# Patient Record
Sex: MALE | Race: WHITE | Employment: PART TIME | URBAN - METROPOLITAN AREA
[De-identification: names, ages, dates, MRNs, and addresses within clinical notes are randomized per-mention and may not be internally consistent; named-entity substitution may affect disease eponyms.]

---

## 2018-05-31 NOTE — PROGRESS NOTES
Physical Therapy  Patient cancelled appointment for 05 31 18, due to illness.   Next visit confirmed for June 7, 2018 @ 08:00 AM

## 2018-06-07 ENCOUNTER — HOSPITAL ENCOUNTER (OUTPATIENT)
Dept: PHYSICAL THERAPY | Age: 53
Discharge: HOME | End: 2018-06-30
Attending: INTERNAL MEDICINE | Admitting: INTERNAL MEDICINE

## 2018-06-07 NOTE — FLOWSHEET NOTE
Outpatient Physical Therapy     [x] Daily Treatment Note   [] Progress Note   [] Discharge Note    Date:  6/7/2018    Patient Name:  Wallace Alcala        YOB: 1965    Restrictions/Precautions: left UE fistula, SVT/AVNRT defibrillator on left side placed in late February or March. ESRD on hemodialysis  Diagnosis: Lymphedema                              ICD 10: I89.0               Treatment Diagnosis:  Bilateral edema  Onset Date: 5/1/17                 Referral Date: 5/8/18             Referring Physician:  Aramis Carballo MD  Visits Allowed/Insurance/Certification information: Burnice Kelli dual  Pt's Occupation/Job Duties: 4 hours 2 days per week, [employer] and [employer]. Social support/Environment: Patient reports that he lives alone in an apartment with one flight of steps to get to apartment. Patient sleeps in rocker recliner with legs going down. Patient has bench and supports his legs on bench. Health History reviewed with pt:  SVT has defibrillator on left side. Had 4 ablations, Low blood pressure, A. Fib, morbid obesity; end stage renal disease 3 days of dialysis per week. Patient has difficulty with breathing when laying flat. Gout. Patient takes extra strength tylenol. Medication in addition to list in chart Multaq 400 mg, metoprolol 25 mg, Midodrine 10 mg 3 times per day, IC Allopurinol 100 mg. Patient went to wound care for left LE secondary to wound on posterior aspect; patient reports that he has been discharged from wound care. Patient reports no lymph nodes removed in past.     Plan of care signed (Y/N): Faxed to MD through [address] [place] [address] Rd. Progress Note covers period from (if applicable):    [x]  NA    [] From          To           Next Progress Note due:   6/28/18    Visit# / total visits:  1/12    Plan for Next Session:  Exercise and wrapping will perform massage if has clearance from cardiac concerns.     Subjective: see evaluation     Pain level: patient did not indicate pain    Objective:       Exercises:    Exercises in bold performed in department today. Items not bolded are carried forward from prior visits for continuity of the record. Exercise/Equipment Resistance/Repetitions Other comments   Education: results of evaluation, exercise and plan of care. toe curls, AP, knee bends and hip IR/ER   Discussed importance of elevating legs and performing exercises. Therapeutic Exercise/Home Exercise Program: see above; discuss/educated on anatomy, condition and treatment of lymphedema and current concerns when reviewing patient history 40 minutes     Group Therapy:   N/A    Therapeutic Activity:  N/A      Gait: N/A    Neuromuscular Re-Education: N/A      Manual Therapy: N/A    Modalities:  N/A    Functional Outcome Measure:   Date recorded: 6/8/18  LEFS  29/64=45=55%    Assessment/Treatment/Activity Tolerance:    GCode:  /CK  Patients response to treatment:   [] Patient tolerated treatment well [] Patient limited by fatigue   [] Patient limited by pain [] Patient limited by other medical complications   [] Other:     Goals:   Progress towards goals:   GOALS  G Code:/CJ  Short Term Goals ( 3   weeks) Long Term Goals (  6 weeks)   1). Initiation of HEP 1). Patient able to don and doff appropriate garment for bilateral LE.   2). 2). Patient able to perform exercises 3-4 times per day with legs elevated. 3). 3). Decrease edema above knee bilaterally by 5-10 cm each LE.   4). 4). Patient reports able to walk better with less difficulty from edema. 5). 5).   6). 6).      Prognosis: [x] Good [] Fair  [] Poor    Patient Requires Follow-up:  [x] Yes  [] No    Plan: [] Continue per plan of care [] Alter current plan (see comments)   [x] Plan of care initiated [] Hold pending MD visit [] Discharge    Timed Code Treatment Minutes:  40    Total

## 2018-06-07 NOTE — PROGRESS NOTES
The following patient has been evaluated for physical therapy services. Please review the attached evaluation and/or summary of the patient's plan of care, and verify that you agree therapy should continue by signing the attached document and sending it back to our office. Thank you for this referral.    Physician signature_______________________ Date________________    Fax to:   [facility] [phone]              LYMPHEDEMA EVALUATION    Date:  2018    Patient Name:  Anitra Unger    :  1965  Restrictions/Precautions: left UE fistula, SVT/AVNRT defibrillator on left side placed in late February or March. ESRD on hemodialysis  Diagnosis: Lymphedema    ICD 10: I89.0  Treatment Diagnosis:  Bilateral edema  Onset Date: 17  Referral Date: 18  Referring Physician:  Ibeth Cummings MD  Visits Allowed/Insurance/Certification information: Jose sheldon  Pt's Occupation/Job Duties: 4 hours 2 days per week, [employer] and [employer]. Social support/Environment: Patient reports that he lives alone in an apartment with one flight of steps to get to apartment. Patient sleeps in rocker recliner with legs going down. Patient has bench and supports his legs on bench. Health History reviewed with pt:  SVT has defibrillator on left side. Had 4 ablations, Low blood pressure, A. Fib, morbid obesity; end stage renal disease 3 days of dialysis per week. Patient has difficulty with breathing when laying flat. Gout. Patient takes extra strength tylenol. Medication in addition to list in chart Multaq 400 mg, metoprolol 25 mg, Midodrine 10 mg 3 times per day, IC Allopurinol 100 mg. Patient went to wound care for left LE secondary to wound on posterior aspect; patient reports that he has been discharged from wound care.  Patient reports no lymph nodes removed in past.    Plan of care signed (Y/N): faxed to MD  Visit# / total visits:  1/  Pain level: patient did not indicate pain   Patient goal for

## 2018-06-28 ENCOUNTER — HOSPITAL ENCOUNTER (OUTPATIENT)
Dept: PHYSICAL THERAPY | Age: 53
Discharge: HOME | End: 2018-06-29

## 2018-07-01 ENCOUNTER — HOSPITAL ENCOUNTER (OUTPATIENT)
Dept: OTHER | Age: 53
Discharge: HOME | End: 2018-07-01
Attending: INTERNAL MEDICINE | Admitting: INTERNAL MEDICINE

## 2018-07-05 ENCOUNTER — HOSPITAL ENCOUNTER (OUTPATIENT)
Dept: PHYSICAL THERAPY | Age: 53
Discharge: HOME | End: 2018-07-06

## 2018-07-05 NOTE — PROGRESS NOTES
Outpatient Physical Therapy     [x] Daily Treatment Note   [x] Progress Note   [] Discharge Note    Date:  7/5/2018    Patient Name:  hSalini Monroe  \"[id number]\"       YOB: 1965    Restrictions/Precautions: left UE fistula, SVT/AVNRT defibrillator on left side placed in late February or March. ESRD on hemodialysis; dialysis Mon, Wed, Friday. 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 Social support/Environment: Patient reports that he lives alone in an apartment with one flight of steps to get to apartment. Patient sleeps in rocker recliner with legs going down. Patient has bench and supports his legs on bench. Health History reviewed with pt:  SVT has defibrillator on left side. Had 4 ablations, Low blood pressure, A. Fib, morbid obesity; end stage renal disease 3 days of dialysis per week. Patient has difficulty with breathing when laying flat. Gout. Patient takes extra strength tylenol. Medication in addition to list in chart Multaq 400 mg, metoprolol 25 mg, Midodrine 10 mg 3 times per day, IC Allopurinol 100 mg. Patient went to wound care for left LE secondary to wound on posterior aspect; patient reports that he has been discharged from wound care. Patient reports no lymph nodes removed in past.     Plan of care signed (Y/N): Faxed to MD through [address] [place] [address] Rd. Received initial evaluation signed by MD. Received note signed by MD for [employer].      Progress Note covers period from (if applicable):    []  NA    [x] From 6/7/18    To   6/28/18        Next Progress Note due:  7/19/18    Visit# / total visits:  3/12    Plan for Next Session:  Exercise and wrapping will perform massage if has clearance from cardiac concerns. Subjective: Patient reports that the wrap only stayed up for half a day. Patient reports that he did not bring wraps back. Instructed patient to bring wraps back next visit. Pain level: Patient did not indicate pain before or after treatment except in low back with standing. Objective:      Before 97%, 78 pulse, /66  After 96%, 70 pulse, /68    Exercises:    Exercises in bold performed in department today. Items not bolded are carried forward from prior visits for continuity of the record. Exercise/Equipment Resistance/Repetitions Other comments   Education: results of evaluation, exercise and plan of care. toe curls, AP, knee bends and hip IR/ER  1x10 Patient wanted to try belt to help bend legs. Used sheet instead secondary to less stress on plantar surface of foot. Therapeutic Exercise/Home Exercise Program: see above; 15 minutes    Group Therapy:   N/A    Therapeutic Activity:  N/A      Gait: N/A    Neuromuscular Re-Education: N/A      Manual Therapy: 46 minutes  Cleared nodes and performed lymphatic massage to upper thigh. Wrapping of right upper thigh with 6 inch stockinette, 6 in cast padding, 2x8 cm Comprilan srinivasan crossed, 2x10 cm Comprilan angled upward. Then placed Circaid strap at angle and attached with Velcro straps and foam straps. Over top of everything, one 6 inch stockinette which was strapped with 2 sheet straps to shirt. Instructed patient to take off if numbness, tingling, falling down or pain.      Modalities:  N/A    Functional Outcome Measure:   Date recorded: 6/28/18 same as prior  LEFS  29/64=45=55%    Assessment/Treatment/Activity Tolerance:    GCode:  /CK  Patients response to treatment:   [x] Patient tolerated treatment well [] Patient limited by fatigue   [] Patient limited

## 2018-07-12 ENCOUNTER — HOSPITAL ENCOUNTER (OUTPATIENT)
Dept: PHYSICAL THERAPY | Age: 53
Discharge: HOME | End: 2018-07-13

## 2018-07-12 NOTE — FLOWSHEET NOTE
Outpatient Physical Therapy     [x] Daily Treatment Note   [x] Progress Note   [] Discharge Note    Date:  7/12/2018    Patient Name:  Silverio Gifford  \"[id number]\"       YOB: 1965    Restrictions/Precautions: left UE fistula, SVT/AVNRT defibrillator on left side placed in late February or March. ESRD on hemodialysis; dialysis Mon, Wed, Friday. 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 Social support/Environment: Patient reports that he lives alone in an apartment with one flight of steps to get to apartment. Patient sleeps in rocker recliner with legs going down. Patient has bench and supports his legs on bench. Health History reviewed with pt:  SVT has defibrillator on left side. Had 4 ablations, Low blood pressure, A. Fib, morbid obesity; end stage renal disease 3 days of dialysis per week. Patient has difficulty with breathing when laying flat. Gout. Patient takes extra strength tylenol. Medication in addition to list in chart Multaq 400 mg, metoprolol 25 mg, Midodrine 10 mg 3 times per day, IC Allopurinol 100 mg. Patient went to wound care for left LE secondary to wound on posterior aspect; patient reports that he has been discharged from wound care. Patient reports no lymph nodes removed in past.     Plan of care signed (Y/N): Faxed to MD through [address] [place] [address] Rd. Received initial evaluation signed by MD. Received note signed by MD for [employer].      Progress Note covers period from (if applicable):    [x]  NA    [] From 6/7/18    To   6/28/18        Next Progress Note due:  7/19/18    Visit# / total visits:  4/12    Plan for Next Session:  Exercise and wrapping will perform massage if has clearance from cardiac concerns. Subjective: Patient reports that wrapping stayed up all day Thursday into Friday. Patient stated that he was having some stomach issues on Tuesday. Pain level: Patient did not indicate pain before or after treatment. Objective:     Before 98%, 76 pulse, /68  After 96%, 75 pulse, /65    Exercises:    Exercises in bold performed in department today. Items not bolded are carried forward from prior visits for continuity of the record. Exercise/Equipment Resistance/Repetitions Other comments   Education: results of evaluation, exercise and plan of care. toe curls, AP, knee bends and hip IR/ER  1x10 Used sheet on plantar surface of foot. Therapeutic Exercise/Home Exercise Program: see above; 10 minutes    Group Therapy:   N/A    Therapeutic Activity:  N/A      Gait: N/A    Neuromuscular Re-Education: N/A      Manual Therapy: 46 minutes  Cleared nodes and performed lymphatic massage to upper thigh, bilaterally. Wrapping of right upper thigh with 6 inch stockinette, 6 in cast padding, 2x8 cm Comprilan srinivasan crossed, 2x10 cm Comprilan angled upward. Then placed Circaid strap at angle and attached with Velcro straps and foam straps. Over top of everything, one 6 inch stockinette which was strapped with 2 sheet straps to shirt. Instructed patient to take off if numbness, tingling, falling down or pain.    Leg wrapping weight - 1.2 inside small plastic bag (ice bag)    Modalities:  N/A    Functional Outcome Measure:   Date recorded: 6/28/18 same as prior  LEFS  29/64=45=55%    Assessment/Treatment/Activity Tolerance:    GCode:  /CK  Patients response to treatment:   [x] Patient tolerated treatment well [] Patient limited by fatigue   [] Patient limited by pain [] Patient limited by other medical complications   [] Other:     Goals:   Progress towards goals:   GOALS  G Code:/CJ  Short Term Goals ( 3   weeks) Long Term Goals (  6 weeks)   1). Initiation of HEP-MET 1). Patient able to don and doff appropriate garment for bilateral LE.   2). 2). Patient able to perform exercises 3-4 times per day with legs elevated. 3). 3). Decrease edema above knee bilaterally by 5-10 cm each LE.   4). 4). Patient reports able to walk better with less difficulty from edema. 5). 5).   6). 6). Prognosis: [x] Good [] Fair  [] Poor    Patient Requires Follow-up:  [x] Yes  [] No    Plan: [x] Continue per plan of care [] Alter current plan (see comments)   [] Plan of care initiated [] Hold pending MD visit [] Discharge   Will continue 2 times per week for 6 weeks for exercise and wrapping and progress lymphatic massage as patient tolerates. Timed Code Treatment Minutes: 68      Total Treatment Minutes: 68    Medicare Cap total YTD:  [phone]    Electronically signed by:   VICKI [id number]

## 2018-07-17 ENCOUNTER — HOSPITAL ENCOUNTER (OUTPATIENT)
Dept: PHYSICAL THERAPY | Age: 53
Setting detail: THERAPIES SERIES
Discharge: HOME | End: 2018-07-17
Payer: COMMERCIAL

## 2018-07-17 PROCEDURE — 97140 MANUAL THERAPY 1/> REGIONS: CPT

## 2018-07-17 PROCEDURE — 97110 THERAPEUTIC EXERCISES: CPT

## 2018-07-17 NOTE — FLOWSHEET NOTE
massage if has clearance from cardiac concerns. Subjective: Patient reports that he has leaking of heart and hardening of heart. Patient had doppler echo. Pain level: Patient reports he is sore in thoracic region secondary to echo. Objective:     Before 98%, 67 pulse, /69  After 98%, 72 pulse, /70 waited 10 minutes 139/78, 98%, pulse 70    Exercises:    Exercises in bold performed in department today. Items not bolded are carried forward from prior visits for continuity of the record. Exercise/Equipment Resistance/Repetitions Other comments   Education: results of evaluation, exercise and plan of care. toe curls, AP, knee bends and hip IR/ER  1x10 Used sheet on plantar surface of foot. Therapeutic Exercise/Home Exercise Program: see above; 15 minutes    Group Therapy:   N/A    Therapeutic Activity:  N/A      Gait: N/A    Neuromuscular Re-Education: N/A      Manual Therapy: 66 minutes  Cleared nodes and performed lymphatic massage to upper thigh, bilaterally. Cleaned right thigh before wrapping with foam soap. Wrapping of right upper thigh with 6 inch stockinette, 6 in cast padding, 2x8 cm Comprilan srinivasan crossed, 2x10 cm Comprilan angled upward. Then placed Circaid strap at angle and attached with Velcro straps and foam straps. Over top of everything, one 6 inch stockinette which was strapped with 2 sheet straps to shirt. Instructed patient to take off if numbness, tingling, falling down or pain.    Leg wrapping weight - 1.2 inside small plastic bag (ice bag)    Modalities:  N/A    Functional Outcome Measure:   Date recorded: 6/28/18 same as prior  LEFS  29/64=45=55%    Assessment/Treatment/Activity Tolerance:    GCode:  /CK  Patients response to treatment:   [x] Patient tolerated treatment well [] Patient limited by fatigue   [] Patient limited by pain [] Patient limited by other medical complications   [] Other:     Goals:   Progress towards goals:   GOALS  G Code:/CJ  Short Term Goals ( 3   weeks) Long Term Goals (  6 weeks)   1). Initiation of HEP-MET 1). Patient able to don and doff appropriate garment for bilateral LE.   2). 2). Patient able to perform exercises 3-4 times per day with legs elevated. 3). 3). Decrease edema above knee bilaterally by 5-10 cm each LE.   4). 4). Patient reports able to walk better with less difficulty from edema. 5). 5).   6). 6). Prognosis: [x] Good [] Fair  [] Poor    Patient Requires Follow-up:  [x] Yes  [] No    Plan: [x] Continue per plan of care [] Alter current plan (see comments)   [] Plan of care initiated [] Hold pending MD visit [] Discharge   Will continue 2 times per week for 6 weeks for exercise and wrapping and progress lymphatic massage as patient tolerates. Timed Code Treatment Minutes: 81     Total Treatment Minutes: 81    Medicare Cap total YTD:  [phone]    Electronically signed by:   Jeniffer Ramírez [id number]

## 2018-07-19 ENCOUNTER — HOSPITAL ENCOUNTER (OUTPATIENT)
Dept: PHYSICAL THERAPY | Age: 53
Setting detail: THERAPIES SERIES
Discharge: HOME | End: 2018-07-19
Payer: COMMERCIAL

## 2018-07-19 NOTE — FLOWSHEET NOTE
Physical Therapy  Cancellation/No-show Note  Patient Name:  Abilio Carmen  :  1965   Date:  2018  Cancels to Date: 3  No-shows to Date: 0    For today's appointment patient:  [x]  Cancelled  []  Rescheduled appointment  []  No-show     Reason given by patient:  []  Patient ill  []  Conflicting appointment  []  No transportation    []  Conflict with work  [x]  No reason given, left message this AM  []  Other:     Comments:      Electronically signed by:   Kailee Monteiro, [id number]

## 2018-07-24 ENCOUNTER — HOSPITAL ENCOUNTER (OUTPATIENT)
Dept: PHYSICAL THERAPY | Age: 53
Setting detail: THERAPIES SERIES
Discharge: HOME | End: 2018-07-24
Payer: COMMERCIAL

## 2018-07-24 PROCEDURE — 97140 MANUAL THERAPY 1/> REGIONS: CPT

## 2018-07-24 PROCEDURE — G8979 MOBILITY GOAL STATUS: HCPCS

## 2018-07-24 PROCEDURE — G8978 MOBILITY CURRENT STATUS: HCPCS

## 2018-07-24 NOTE — FLOWSHEET NOTE
Outpatient Physical Therapy     [x] Daily Treatment Note   [] Progress Note   [] Discharge Note    Date:  7/24/2018    Patient Name:  Annabelle Mi  \"[id number]\"       YOB: 1965    Restrictions/Precautions: left UE fistula, SVT/AVNRT defibrillator on left side placed in late February or March. ESRD on hemodialysis; dialysis Mon, Wed, Friday. Diagnosis: Lymphedema                              ICD 10: I89.0               Treatment Diagnosis:  Bilateral edema  Onset Date: 5/1/17                 Referral Date: 5/8/18             Referring Physician:  Gabriela Burrell MD  Visits Allowed/Insurance/Certification information: Romeo Call dual  Pt's Occupation/Job Duties: 4 hours 2 days per week, [employer] and [employer]. Social support/Environment: Patient reports that he lives alone in an apartment with one flight of steps to get to apartment. Patient sleeps in rocker recliner with legs going down. Patient has bench and supports his legs on bench. Health History reviewed with pt:  SVT has defibrillator on left side. Had 4 ablations, Low blood pressure, A. Fib, morbid obesity; end stage renal disease 3 days of dialysis per week. Patient has difficulty with breathing when laying flat. Gout. Patient takes extra strength tylenol. Medication in addition to list in chart Multaq 400 mg, metoprolol 25 mg, Midodrine 10 mg 3 times per day, IC Allopurinol 100 mg. Patient went to wound care for left LE secondary to wound on posterior aspect; patient reports that he has been discharged from wound care. Patient reports no lymph nodes removed in past.     Plan of care signed (Y/N): Faxed to MD through [address] [place] [address] Rd. Received initial evaluation signed by MD. Received note signed by MD for [employer].      Progress Note covers period from (if applicable):    [x]  NA    [] From 6/7/18    To   6/28/18        Next Progress Note due:  8/14/18    Visit# / total visits:  6/12    Plan for Next Session:  Exercise and wrapping will perform massage if has clearance from cardiac concerns. Subjective: Pt reports that had to give hep B booster at dialysis. Pt reports decreased weight to 1.1 lbs. Pain level: pt back reports start Saturday when he got up 10/10 today first getting up 5-6/10, walking back to treatment area 4-5/10 now with rest 0/10. End of treatment 2-3/10. Objective:     Before right 99%, 70 pulse, /69; 93% on left  After 98%, 69 pulse, /61     Lower Extremity R L R L R L   Date  6/7/18 6/7/18 6/28/18 6/28/18 7/24/18 7/24/18       6\"   inches above knee distal patella 83 80.9 83.8 77.9 76.5 77   2\" inches above knee distal patella         89 79.5 83.8 75.4 83.3 74.4   Knee distal patella          57 58.8 57.9 53.5 56.8 52   5.5\" Below knee distal patella 46.3 47.5 44.5 49.8 41.4 45.2   10\" Below knee distal patella 34.6 40 34.3 39.7 31.7 36.7   15\" Below knee distal patella 34 39.1 33.7 36.2 32.4 35.7           Proximal  malleolus 31.4 32.7 30.4 30.8 30.1 31.8   Midfoot-Figure 8/Navicular 60.5 62.8 58.9 59.2 57.9 59.8   Metatarsal heads          23.7 23 23.4 23.7 23.5 23                TOTAL GIRTH 459. 5   cm  464.3 cm 450.7 446.2 433.6 435.6   Decreased right by 25.9 cm and right decreased by 28.7 cm   Above knee decreased right by 5.7 cm and left by 5.1 cm    Exercises:    Exercises in bold performed in department today. Items not bolded are carried forward from prior visits for continuity of the record. Exercise/Equipment Resistance/Repetitions Other comments   Education: results of evaluation, exercise and plan of care. toe curls, AP, knee bends and hip IR/ER  1x10 Used sheet on plantar surface of foot.                                                                                                                                 Therapeutic Exercise/Home Exercise Program: see above  Group Therapy:   N/A    Therapeutic Activity:  N/A      Gait: N/A    Neuromuscular Re-Education: N/A      Manual

## 2018-07-26 ENCOUNTER — HOSPITAL ENCOUNTER (OUTPATIENT)
Dept: PHYSICAL THERAPY | Age: 53
Setting detail: THERAPIES SERIES
Discharge: HOME | End: 2018-07-26
Payer: COMMERCIAL

## 2018-07-26 PROCEDURE — 97140 MANUAL THERAPY 1/> REGIONS: CPT

## 2018-07-26 NOTE — FLOWSHEET NOTE
massage if has clearance from cardiac concerns. Subjective: Pt reports that wraps were down within 2 hours. after          Pain level: pt back reports start Saturday when he got up 10/10 today first getting up 5-6/10, walking back to treatment area 4-5/10 now with rest 0/10. End of treatment 2-3/10. Objective:     Before right 99%, 85 pulse, /69   After 99%, 70 pulse, /65     Lower Extremity R L R L R L   Date  6/7/18 6/7/18 6/28/18 6/28/18 7/24/18 7/24/18       6\"   inches above knee distal patella 83 80.9 83.8 77.9 76.5 77   2\" inches above knee distal patella         89 79.5 83.8 75.4 83.3 74.4   Knee distal patella          57 58.8 57.9 53.5 56.8 52   5.5\" Below knee distal patella 46.3 47.5 44.5 49.8 41.4 45.2   10\" Below knee distal patella 34.6 40 34.3 39.7 31.7 36.7   15\" Below knee distal patella 34 39.1 33.7 36.2 32.4 35.7           Proximal  malleolus 31.4 32.7 30.4 30.8 30.1 31.8   Midfoot-Figure 8/Navicular 60.5 62.8 58.9 59.2 57.9 59.8   Metatarsal heads          23.7 23 23.4 23.7 23.5 23                TOTAL GIRTH 459. 5   cm  464.3 cm 450.7 446.2 433.6 435.6   7/24/18: Decreased right by 25.9 cm and right decreased by 28.7 cm    Above knee decreased right by 5.7 cm and left by 5.1 cm    Exercises:    Exercises in bold performed in department today. Items not bolded are carried forward from prior visits for continuity of the record. Exercise/Equipment Resistance/Repetitions Other comments   Education: results of evaluation, exercise and plan of care. toe curls, AP, knee bends and hip IR/ER  1x10 Used sheet on plantar surface of foot. Therapeutic Exercise/Home Exercise Program: see above; education on circaid reduction kit for thigh and knee as well as adhesive wrap to hold up wraps.  d 20 minutes    Group Therapy:   N/A    Therapeutic Activity:  N/A      Gait: N/A    Neuromuscular Re-Education: N/A      Manual Therapy: 87  minutes  Cleared nodes and performed lymphatic

## 2018-07-31 ENCOUNTER — HOSPITAL ENCOUNTER (OUTPATIENT)
Dept: PHYSICAL THERAPY | Age: 53
Setting detail: THERAPIES SERIES
Discharge: HOME | End: 2018-07-31
Payer: COMMERCIAL

## 2018-07-31 PROCEDURE — 97140 MANUAL THERAPY 1/> REGIONS: CPT

## 2018-07-31 NOTE — FLOWSHEET NOTE
massage if has clearance from cardiac concerns. Subjective: Pt reports that wrapping did not last until getting home. Pain level:  Patient has back pain 1-2/10 to start. End 1-2/10    Objective:     Before right 98%, 77 pulse, /66   After 99%, 72 pulse, /82     Lower Extremity R L R L R L   Date  6/7/18 6/7/18 6/28/18 6/28/18 7/24/18 7/24/18       6\"   inches above knee distal patella 83 80.9 83.8 77.9 76.5 77   2\" inches above knee distal patella         89 79.5 83.8 75.4 83.3 74.4   Knee distal patella          57 58.8 57.9 53.5 56.8 52   5.5\" Below knee distal patella 46.3 47.5 44.5 49.8 41.4 45.2   10\" Below knee distal patella 34.6 40 34.3 39.7 31.7 36.7   15\" Below knee distal patella 34 39.1 33.7 36.2 32.4 35.7           Proximal  malleolus 31.4 32.7 30.4 30.8 30.1 31.8   Midfoot-Figure 8/Navicular 60.5 62.8 58.9 59.2 57.9 59.8   Metatarsal heads          23.7 23 23.4 23.7 23.5 23                TOTAL GIRTH 459. 5   cm  464.3 cm 450.7 446.2 433.6 435.6   7/24/18: Decreased right by 25.9 cm and right decreased by 28.7 cm    Above knee decreased right by 5.7 cm and left by 5.1 cm    Exercises:    Exercises in bold performed in department today. Items not bolded are carried forward from prior visits for continuity of the record. Exercise/Equipment Resistance/Repetitions Other comments   Education: results of evaluation, exercise and plan of care. toe curls, AP, knee bends and hip IR/ER  1x10 Used sheet on plantar surface of foot. Therapeutic Exercise/Home Exercise Program: see above    Group Therapy:   N/A    Therapeutic Activity:  N/A      Gait: N/A    Neuromuscular Re-Education: N/A      Manual Therapy: 91  minutes  Cleared nodes and performed lymphatic massage to whole leg, bilaterally. Cleaned right thigh before wrapping with foam soap.  Wrapping of right upper thigh with 6 inch stockinette, 6 in cast padding, 2x8 cm Comprilan srinivasan crossed, 2x10 cm Comprilan angled

## 2018-08-02 ENCOUNTER — HOSPITAL ENCOUNTER (OUTPATIENT)
Dept: PHYSICAL THERAPY | Age: 53
Setting detail: THERAPIES SERIES
Discharge: HOME | End: 2018-08-02
Payer: COMMERCIAL

## 2018-08-02 PROCEDURE — 97140 MANUAL THERAPY 1/> REGIONS: CPT

## 2018-08-02 NOTE — FLOWSHEET NOTE
Outpatient Physical Therapy     [x] Daily Treatment Note   [] Progress Note   [] Discharge Note    Date:  8/2/2018    Patient Name:  Kassandra Hidalgo  \"[id number]\"       YOB: 1965    Restrictions/Precautions: left UE fistula, SVT/AVNRT defibrillator on left side placed in late February or March. ESRD on hemodialysis; dialysis Mon, Wed, Friday. 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 Social support/Environment: Patient reports that he lives alone in an apartment with one flight of steps to get to apartment. Patient sleeps in rocker recliner with legs going down. Patient has bench and supports his legs on bench. Health History reviewed with pt:  SVT has defibrillator on left side. Had 4 ablations, Low blood pressure, A. Fib, morbid obesity; end stage renal disease 3 days of dialysis per week. Patient has difficulty with breathing when laying flat. Gout. Patient takes extra strength tylenol. Medication in addition to list in chart Multaq 400 mg, metoprolol 25 mg, Midodrine 10 mg 3 times per day, IC Allopurinol 100 mg. Patient went to wound care for left LE secondary to wound on posterior aspect; patient reports that he has been discharged from wound care. Patient reports no lymph nodes removed in past.     Plan of care signed (Y/N): Faxed to MD through [address] [place] [address] Rd. Received initial evaluation signed by MD. Received note signed by MD for [employer].      Progress Note covers period from (if applicable):    [x]  NA    [] From 6/7/18    To   6/28/18        Next Progress Note due:  8/14/18    Visit# / total visits:  9/12    Plan for Next Session:  Exercise and wrapping will perform Modalities:  N/A    Functional Outcome Measure:   Date recorded: 7/24/18 same as prior  LEFS  29/64=45=55%    Assessment/Treatment/Activity Tolerance:    GCode:  /CK  Patients response to treatment:   [x] Patient tolerated treatment well [] Patient limited by fatigue   [] Patient limited by pain [] Patient limited by other medical complications   [] Other:     Goals:   Progress towards goals:   GOALS  G Code:/CJ  Short Term Goals ( 3   weeks) Long Term Goals (  6 weeks)   1). Initiation of HEP-MET 1). Patient able to don and doff appropriate garment for bilateral LE.   2). 2). Patient able to perform exercises 3-4 times per day with legs elevated. 3). 3). Decrease edema above knee bilaterally by 5-10 cm each LE. MET for 5 cm   4). 4). Patient reports able to walk better with less difficulty from edema. 5). 5).   6). 6). Prognosis: [x] Good [] Fair  [] Poor    Patient Requires Follow-up:  [x] Yes  [] No    Plan: [x] Continue per plan of care [] Alter current plan (see comments)   [] Plan of care initiated [] Hold pending MD visit [] Discharge   Will continue 2-3 more visits and reassess as well as for exercise and wrapping and progress lymphatic massage as patient tolerates. Will call Dr. Brandon Maria office to see if ok to increase to 3 times per week. Timed Code Treatment Minutes:  65    Total Treatment Minutes: 65    Medicare Cap total YTD: [phone]    Electronically signed by:   Yesenia Saini [id number]

## 2018-08-07 ENCOUNTER — HOSPITAL ENCOUNTER (OUTPATIENT)
Dept: PHYSICAL THERAPY | Age: 53
Setting detail: THERAPIES SERIES
Discharge: HOME | End: 2018-08-07
Payer: COMMERCIAL

## 2018-08-07 PROCEDURE — 97140 MANUAL THERAPY 1/> REGIONS: CPT

## 2018-08-09 ENCOUNTER — HOSPITAL ENCOUNTER (OUTPATIENT)
Dept: PHYSICAL THERAPY | Age: 53
Setting detail: THERAPIES SERIES
Discharge: HOME | End: 2018-08-09
Payer: COMMERCIAL

## 2018-08-09 PROCEDURE — 97140 MANUAL THERAPY 1/> REGIONS: CPT

## 2018-08-09 NOTE — FLOWSHEET NOTE
massage if has clearance from cardiac concerns. Subjective: Pt reports that he has been out of it yesterday secondary to dialysis. Pt reports that he is doing better today. Pain level:  Patient has back pain 0/10 to start. End 0/10    Objective:     Before right 98%, 79 pulse, /62  After 98%, 62 pulse, /73    Lower Extremity R L R L R L   Date  6/7/18 6/7/18 6/28/18 6/28/18 7/24/18 7/24/18       6\"   inches above knee distal patella 83 80.9 83.8 77.9 76.5 77   2\" inches above knee distal patella         89 79.5 83.8 75.4 83.3 74.4   Knee distal patella          57 58.8 57.9 53.5 56.8 52   5.5\" Below knee distal patella 46.3 47.5 44.5 49.8 41.4 45.2   10\" Below knee distal patella 34.6 40 34.3 39.7 31.7 36.7   15\" Below knee distal patella 34 39.1 33.7 36.2 32.4 35.7           Proximal  malleolus 31.4 32.7 30.4 30.8 30.1 31.8   Midfoot-Figure 8/Navicular 60.5 62.8 58.9 59.2 57.9 59.8   Metatarsal heads          23.7 23 23.4 23.7 23.5 23                TOTAL GIRTH 459. 5   cm  464.3 cm 450.7 446.2 433.6 435.6   7/24/18: Decreased right by 25.9 cm and right decreased by 28.7 cm    Above knee decreased right by 5.7 cm and left by 5.1 cm    Exercises:    Exercises in bold performed in department today. Items not bolded are carried forward from prior visits for continuity of the record. Exercise/Equipment Resistance/Repetitions Other comments   Education: results of evaluation, exercise and plan of care. toe curls, AP, knee bends and hip IR/ER  1x10 Used sheet on plantar surface of foot. Therapeutic Exercise/Home Exercise Program: see above    Group Therapy:   N/A    Therapeutic Activity:  N/A      Gait: N/A    Neuromuscular Re-Education: N/A      Manual Therapy:  minutes  Cleared nodes and performed lymphatic massage to whole leg, bilaterally except bilateral feet.   Wrapping of right upper thigh with 6 inch stockinette, 6 inch Cellona Synthetic Padding, 2x8 cm Terrelln srinivasan crossed, 3x10 cm Comprilan angled upward and then coband over top with srinivasan cross posterior knee. Over top of everything, one 6 inch stockinette which was strapped with 2 sheet straps to shirt. Instructed patient to take off if numbness, tingling, falling down or pain. Modalities:  N/A    Functional Outcome Measure:   Date recorded: 7/24/18 same as prior  LEFS  29/64=45=55%    Assessment/Treatment/Activity Tolerance:    GCode:  /CK  Patients response to treatment:   [x] Patient tolerated treatment well [] Patient limited by fatigue   [] Patient limited by pain [] Patient limited by other medical complications   [] Other:     Goals:   Progress towards goals:   GOALS  G Code:/CJ  Short Term Goals ( 3   weeks) Long Term Goals (  6 weeks)   1). Initiation of HEP-MET 1). Patient able to don and doff appropriate garment for bilateral LE.   2). 2). Patient able to perform exercises 3-4 times per day with legs elevated. 3). 3). Decrease edema above knee bilaterally by 5-10 cm each LE. MET for 5 cm   4). 4). Patient reports able to walk better with less difficulty from edema. 5). 5).   6). 6). Prognosis: [x] Good [] Fair  [] Poor    Patient Requires Follow-up:  [x] Yes  [] No    Plan: [x] Continue per plan of care [] Alter current plan (see comments)   [] Plan of care initiated [] Hold pending MD visit [] Discharge   Will continue 1 more visits and reassess as well as for exercise and wrapping and progress lymphatic massage as patient tolerates. Timed Code Treatment Minutes:  81    Total Treatment Minutes: 81    Medicare Cap total YTD: [phone]    Electronically signed by:   Ton Bravo, [id number]

## 2018-08-14 ENCOUNTER — HOSPITAL ENCOUNTER (OUTPATIENT)
Dept: PHYSICAL THERAPY | Age: 53
Setting detail: THERAPIES SERIES
Discharge: HOME | End: 2018-08-14
Payer: COMMERCIAL

## 2018-08-16 ENCOUNTER — HOSPITAL ENCOUNTER (OUTPATIENT)
Dept: PHYSICAL THERAPY | Age: 53
Setting detail: THERAPIES SERIES
Discharge: HOME | End: 2018-08-16
Payer: COMMERCIAL

## 2018-08-16 PROCEDURE — 97140 MANUAL THERAPY 1/> REGIONS: CPT

## 2018-08-16 PROCEDURE — G8978 MOBILITY CURRENT STATUS: HCPCS

## 2018-08-16 PROCEDURE — G8979 MOBILITY GOAL STATUS: HCPCS

## 2018-08-16 NOTE — FLOWSHEET NOTE
reports able to walk better with less difficulty from edema. 5). 5).   6). 6). Prognosis: [x] Good [] Fair  [] Poor    Patient Requires Follow-up:  [x] Yes  [] No    Plan: [x] Continue per plan of care [] Alter current plan (see comments)   [] Plan of care initiated [] Hold pending MD visit [] Discharge  Continue 2-3 times per week for 3-4 more weeks with treatment including lymphatic massage bilateral LE, exercise and wrapping    Timed Code Treatment Minutes:  90    Total Treatment Minutes: 90    Medicare Cap total YTD: [phone]    Electronically signed by:   Juarez Carver [id number]

## 2018-08-17 ENCOUNTER — HOSPITAL ENCOUNTER (OUTPATIENT)
Dept: PHYSICAL THERAPY | Age: 53
Setting detail: THERAPIES SERIES
Discharge: HOME | End: 2018-08-17
Payer: COMMERCIAL

## 2018-08-17 NOTE — PROGRESS NOTES
Physical Therapy  Received note signed by MD to change plan of care to 2-3 times per week for 3-4 weeks.  [facility]

## 2018-08-17 NOTE — FLOWSHEET NOTE
Physical Therapy  Cancellation/No-show Note  Patient Name:  Yulia Miranda  :  1965   Date:  2018  Cancels to Date: 5  No-shows to Date: 0    For today's appointment patient:  [x]  Cancelled  []  Rescheduled appointment  []  No-show     Reason given by patient:  []  Patient ill  []  Conflicting appointment  []  No transportation    []  Conflict with work  []  No reason given  [x]  Other:  Other low BP after dialysis 92/48; pt stated that he would see PT next week. Comments:      Electronically signed by:   Mariella Rollins, [id number]

## 2018-08-21 ENCOUNTER — HOSPITAL ENCOUNTER (OUTPATIENT)
Dept: PHYSICAL THERAPY | Age: 53
Setting detail: THERAPIES SERIES
Discharge: HOME | End: 2018-08-21
Payer: COMMERCIAL

## 2018-08-21 PROCEDURE — 97032 APPL MODALITY 1+ESTIM EA 15: CPT

## 2018-08-21 PROCEDURE — 97140 MANUAL THERAPY 1/> REGIONS: CPT

## 2018-08-21 NOTE — FLOWSHEET NOTE
perform massage if has clearance from cardiac concerns. Subjective: Pt reports that wrap did not last very long. Pain level: Pt did not report pain today. Pt reports pain in jaw last evening. Objective:     Before right 98%, 76 pulse, /64  After 98%, 67 pulse, /74    Observation:  Decrease noted with red area left lateral thigh. Decreased smaller pink area inferior to above note area. Exercises:    Exercises in bold performed in department today. Items not bolded are carried forward from prior visits for continuity of the record. Exercise/Equipment Resistance/Repetitions Other comments   Education: results of evaluation, exercise and plan of care. toe curls, AP, knee bends and hip IR/ER  1x10 Used sheet on plantar surface of foot. Therapeutic Exercise/Home Exercise Program: see above    Group Therapy:   N/A    Therapeutic Activity:  N/A      Gait: N/A    Neuromuscular Re-Education: N/A      Manual Therapy: 75 minutes  Cleared nodes and performed lymphatic massage to whole leg, bilaterally except bilateral feet. Massaged around reddened area, not through area. Wrapping of right upper thigh with 6 inch stockinette, 6 inch Cellona Synthetic Padding, 2x8 cm Comprilan srinivasan crossed, 2x10 cm Comprilan angled upward and then coband over top at bottom distal to knee. Over top of everything, one 6 inch stockinette which was strapped with 2 sheet straps to shirt. Instructed patient to take off if numbness, tingling, falling down or pain.     Modalities:  N/A    Functional Outcome Measure:   Date recorded: 8/16/18 same as prior  LEFS   29/64=45=55%    Assessment/Treatment/Activity Tolerance:    GCode:  /CK  Patients response to treatment:   [x] Patient tolerated treatment well [] Patient limited by fatigue   [] Patient limited by pain [] Patient limited by other medical complications   [] Other:     Goals:   Progress towards goals:   GOALS  G Code:/CJ  Short Term

## 2018-08-23 ENCOUNTER — HOSPITAL ENCOUNTER (OUTPATIENT)
Dept: PHYSICAL THERAPY | Age: 53
Setting detail: THERAPIES SERIES
Discharge: HOME | End: 2018-08-23
Payer: COMMERCIAL

## 2018-08-23 PROCEDURE — 97140 MANUAL THERAPY 1/> REGIONS: CPT

## 2018-08-23 NOTE — FLOWSHEET NOTE
Outpatient Physical Therapy   [x] Daily Treatment Note   [] Progress Note [] Discharge Note    Date:  8/23/2018    Patient Name:  Gail Silva  \"RXBEG\"       YOB: 1965    Restrictions/Precautions: left UE fistula, SVT/AVNRT defibrillator on left side placed in late February or March. ESRD on hemodialysis; dialysis Mon, Wed, Friday. 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 Social support/Environment: Patient reports that he lives alone in an apartment with one flight of steps to get to apartment. Patient sleeps in rocker recliner with legs going down. Patient has bench and supports his legs on bench. Health History reviewed with pt:  SVT has defibrillator on left side. Had 4 ablations, Low blood pressure, A. Fib, morbid obesity; end stage renal disease 3 days of dialysis per week. Patient has difficulty with breathing when laying flat. Gout. Patient takes extra strength tylenol. Medication in addition to list in chart Multaq 400 mg, metoprolol 25 mg, Midodrine 10 mg 3 times per day, IC Allopurinol 100 mg. Patient went to wound care for left LE secondary to wound on posterior aspect; patient reports that he has been discharged from wound care. Patient reports no lymph nodes removed in past.     Plan of care signed (Y/N): Faxed to MD through [address] [place] [address] Rd. Received initial evaluation signed by MD. Received note signed by MD for [employer].      Progress Note covers period from (if applicable):    [x]  NA    [] From 6/28/18    To   7/24/18      Next Progress Note due:  9/6/18    Visit# / total visits:  14/18-24    Plan for Next Session:  Exercise and wrapping will perform and doff appropriate garment for bilateral LE.   2). 2). Patient able to perform exercises 3-4 times per day with legs elevated. 3). 3). Decrease edema above knee bilaterally by 5-10 cm each LE. MET for both   4). 4). Patient reports able to walk better with less difficulty from edema. 5). 5).   6). 6). Prognosis: [x] Good [] Fair  [] Poor    Patient Requires Follow-up:  [x] Yes  [] No    Plan: [x] Continue per plan of care [] Alter current plan (see comments)   [] Plan of care initiated [] Hold pending MD visit [] Discharge  Continue 2-3 times per week for 3-4 more weeks with treatment including lymphatic massage bilateral LE, exercise and wrapping    Timed Code Treatment Minutes:  75    Total Treatment Minutes: 75    Medicare Cap total YTD: [phone]    Electronically signed by:   Kim Ariza, [id number]

## 2018-08-24 ENCOUNTER — HOSPITAL ENCOUNTER (OUTPATIENT)
Dept: PHYSICAL THERAPY | Age: 53
Setting detail: THERAPIES SERIES
Discharge: HOME | End: 2018-08-24
Payer: COMMERCIAL

## 2018-08-24 PROCEDURE — 97140 MANUAL THERAPY 1/> REGIONS: CPT

## 2018-08-24 NOTE — FLOWSHEET NOTE
massage if has clearance from cardiac concerns. Subjective: Pt reports that wrap did not last very long. Pt reports that he can tell that the massaging is helping. Pain level: Pt did not report pain today. Objective:     Before right 98%, 75 pulse, /65  After 99%, 64 pulse, /61    Observation:  Decrease noted with red area left lateral thigh. Decreased smaller pink area inferior to above note area. Exercises:    Exercises in bold performed in department today. Items not bolded are carried forward from prior visits for continuity of the record. Exercise/Equipment Resistance/Repetitions Other comments   Education: results of evaluation, exercise and plan of care. toe curls, AP, knee bends and hip IR/ER  1x10 Used sheet on plantar surface of foot. Therapeutic Exercise/Home Exercise Program: see above    Group Therapy:   N/A    Therapeutic Activity:  N/A      Gait: N/A    Neuromuscular Re-Education: N/A      Manual Therapy: 56 minutes  Cleared nodes and performed lymphatic massage to whole leg, bilaterally except bilateral feet. Modalities:  N/A    Functional Outcome Measure:   Date recorded: 8/16/18 same as prior  LEFS   29/64=45=55%    Assessment/Treatment/Activity Tolerance:    GCode:  /CK  Patients response to treatment:   [x] Patient tolerated treatment well [] Patient limited by fatigue   [] Patient limited by pain [] Patient limited by other medical complications   [] Other:     Goals:   Progress towards goals:   GOALS  G Code:/CJ  Short Term Goals ( 3   weeks) Long Term Goals (  6 weeks)   1). Initiation of HEP-MET 1). Patient able to don and doff appropriate garment for bilateral LE.   2). 2). Patient able to perform exercises 3-4 times per day with legs elevated. 3). 3). Decrease edema above knee bilaterally by 5-10 cm each LE. MET for both   4). 4). Patient reports able to walk better with less difficulty from edema.     5). 5).   6). 6).

## 2018-08-28 ENCOUNTER — HOSPITAL ENCOUNTER (OUTPATIENT)
Dept: PHYSICAL THERAPY | Age: 53
Setting detail: THERAPIES SERIES
Discharge: HOME | End: 2018-08-28
Payer: COMMERCIAL

## 2018-08-28 PROCEDURE — 97140 MANUAL THERAPY 1/> REGIONS: CPT

## 2018-08-30 ENCOUNTER — HOSPITAL ENCOUNTER (OUTPATIENT)
Dept: PHYSICAL THERAPY | Age: 53
Setting detail: THERAPIES SERIES
Discharge: HOME | End: 2018-08-30
Payer: COMMERCIAL

## 2018-08-30 PROCEDURE — 97140 MANUAL THERAPY 1/> REGIONS: CPT

## 2018-08-30 NOTE — FLOWSHEET NOTE
Outpatient Physical Therapy   [x] Daily Treatment Note   [] Progress Note [] Discharge Note    Date:  8/30/2018    Patient Name:  Kamla Wood  \"MOLLY\"       YOB: 1965    Restrictions/Precautions: left UE fistula, SVT/AVNRT defibrillator on left side placed in late February or March. ESRD on hemodialysis; dialysis Mon, Wed, Friday. Diagnosis: Lymphedema                              ICD 10: I89.0               Treatment Diagnosis:  Bilateral edema  Onset Date: 5/1/17                 Referral Date: 5/8/18             Referring Physician:  Ashley Strauss MD  Visits Allowed/Insurance/Certification information: [facility] dual  Pt's Occupation/Job Duties: 4 hours 2 days per week, [employer] and [employer]. Social support/Environment: Patient reports that he lives alone in an apartment with one flight of steps to get to apartment. Patient sleeps in rocker recliner with legs going down. Patient has bench and supports his legs on bench. Health History reviewed with pt:  SVT has defibrillator on left side. Had 4 ablations, Low blood pressure, A. Fib, morbid obesity; end stage renal disease 3 days of dialysis per week. Patient has difficulty with breathing when laying flat. Gout. Patient takes extra strength tylenol. Medication in addition to list in chart Multaq 400 mg, metoprolol 25 mg, Midodrine 10 mg 3 times per day, IC Allopurinol 100 mg. Patient went to wound care for left LE secondary to wound on posterior aspect; patient reports that he has been discharged from wound care. Patient reports no lymph nodes removed in past.     Plan of care signed (Y/N): Faxed to MD through [address] [place] [address] Rd. Received initial evaluation signed by MD. Received note signed by MD for [employer]. Received note signed for continuation of PT an re-cert.       Progress Note covers period from (if applicable):    [x]  NA    [] From 7/24/18   To  8/16/18       Next Progress Note due:  9/6/18    Visit# / total visits:  17/18-24    Plan for Next Session:  Exercise and wrapping will perform massage if has clearance from cardiac concerns. Subjective: Pt reports that yesterday had decrease in BP at dialysis. Pt reports that he checked at [employer] for compression shorts and was not able to get his size. Pain level: Pt did not report pain today. Objective:     Before right 98%, 80 pulse, /72  After 98%, 70 pulse, /72    Observation:  Decrease noted with red area left lateral thigh. Decreased smaller pink area inferior to above note area. Exercises:    Exercises in bold performed in department today. Items not bolded are carried forward from prior visits for continuity of the record. Exercise/Equipment Resistance/Repetitions Other comments   Education: results of evaluation, exercise and plan of care. toe curls, AP, knee bends and hip IR/ER  1x10 Used sheet on plantar surface of foot. Therapeutic Exercise/Home Exercise Program: see above    Group Therapy:   N/A    Therapeutic Activity:  N/A      Gait: N/A    Neuromuscular Re-Education: N/A      Manual Therapy:  minutes  Cleared nodes and performed lymphatic massage to whole leg, bilaterally except bilateral feet. Looked up compression shorts on [employer] and resized pt. PT to call and get clarification. Modalities:  N/A    Functional Outcome Measure:   Date recorded: 8/16/18 same as prior  LEFS   29/64=45=55%    Assessment/Treatment/Activity Tolerance:    GCode:  /CK  Patients response to treatment:   [x] Patient tolerated treatment well [] Patient limited by fatigue   [] Patient limited by pain [] Patient limited by other medical complications   [] Other:     Goals:   Progress towards goals:   GOALS  G Code:/CJ  Short Term Goals ( 3   weeks) Long Term Goals (  6 weeks)   1). Initiation of HEP-MET 1). Patient able to don and doff appropriate garment for bilateral LE.   2). 2).  Patient able to perform exercises 3-4 times

## 2018-09-04 ENCOUNTER — HOSPITAL ENCOUNTER (OUTPATIENT)
Dept: PHYSICAL THERAPY | Age: 53
Setting detail: THERAPIES SERIES
Discharge: HOME | End: 2018-09-04
Payer: COMMERCIAL

## 2018-09-04 PROCEDURE — 97140 MANUAL THERAPY 1/> REGIONS: CPT

## 2018-09-04 NOTE — FLOWSHEET NOTE
Next Session:  Exercise and wrapping will perform massage if has clearance from cardiac concerns. Subjective: Pt reports he was sick to his stomach last night. Pain level: No pain    Objective:     Before right 98%, 75 pulse, /72  After 98%, 76 pulse, /69    Observation:  Decrease noted with red area left lateral thigh. Decreased smaller pink area inferior to above note area. Exercises:    Exercises in bold performed in department today. Items not bolded are carried forward from prior visits for continuity of the record. Exercise/Equipment Resistance/Repetitions Other comments   Education: results of evaluation, exercise and plan of care. toe curls, AP, knee bends and hip IR/ER  1x10 Used sheet on plantar surface of foot. Therapeutic Exercise/Home Exercise Program: see above    Group Therapy:   N/A    Therapeutic Activity:  N/A      Gait: N/A    Neuromuscular Re-Education: N/A      Manual Therapy:  minutes  Cleared nodes and performed lymphatic massage to whole leg, bilaterally except bilateral feet. Looked up compression shorts on [employer] and resized pt. PT to call and get clarification. Modalities:  N/A    Functional Outcome Measure:   Date recorded: 8/16/18 same as prior  LEFS   29/64=45=55%    Assessment/Treatment/Activity Tolerance:    GCode:  /CK  Patients response to treatment:   [x] Patient tolerated treatment well [] Patient limited by fatigue   [] Patient limited by pain [] Patient limited by other medical complications   [] Other:     Goals:   Progress towards goals:   GOALS  G Code:/CJ  Short Term Goals ( 3   weeks) Long Term Goals (  6 weeks)   1). Initiation of HEP-MET 1). Patient able to don and doff appropriate garment for bilateral LE.   2). 2). Patient able to perform exercises 3-4 times per day with legs elevated. 3). 3). Decrease edema above knee bilaterally by 5-10 cm each LE. MET for both   4). 4).  Patient reports able to

## 2018-09-06 ENCOUNTER — HOSPITAL ENCOUNTER (OUTPATIENT)
Dept: PHYSICAL THERAPY | Age: 53
Setting detail: THERAPIES SERIES
Discharge: HOME | End: 2018-09-06
Payer: COMMERCIAL

## 2018-09-06 PROCEDURE — G8979 MOBILITY GOAL STATUS: HCPCS

## 2018-09-06 PROCEDURE — 97140 MANUAL THERAPY 1/> REGIONS: CPT

## 2018-09-06 PROCEDURE — G8978 MOBILITY CURRENT STATUS: HCPCS

## 2018-09-06 NOTE — PROGRESS NOTES
Outpatient Physical Therapy   [x] Daily Treatment Note   [] Progress Note [] Discharge Note    Date:  9/6/2018    Patient Name:  Kassandra Hidalgo  \"TZPTR\"       YOB: 1965    Restrictions/Precautions: left UE fistula, SVT/AVNRT defibrillator on left side placed in late February or March. ESRD on hemodialysis; dialysis Mon, Wed, Friday. 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 Social support/Environment: Patient reports that he lives alone in an apartment with one flight of steps to get to apartment. Patient sleeps in rocker recliner with legs going down. Patient has bench and supports his legs on bench. Health History reviewed with pt:  SVT has defibrillator on left side. Had 4 ablations, Low blood pressure, A. Fib, morbid obesity; end stage renal disease 3 days of dialysis per week. Patient has difficulty with breathing when laying flat. Gout. Patient takes extra strength tylenol. Medication in addition to list in chart Multaq 400 mg, metoprolol 25 mg, Midodrine 10 mg 3 times per day, IC Allopurinol 100 mg. Patient went to wound care for left LE secondary to wound on posterior aspect; patient reports that he has been discharged from wound care. Patient reports no lymph nodes removed in past.     Plan of care signed (Y/N): Faxed to MD through [address] [place] [address] Rd. Received initial evaluation signed by MD. Received note signed by MD for Circaids. Received note signed for continuation of PT an re-cert.       Progress Note covers period from (if applicable):    []  NA    [x] From 8/16/18   To  9/6/18       Next Progress Note due:  9/6/18    Visit# / total visits:  19/18-24    Plan for Next Session:  Exercise , perform massage     Subjective: Pt reports his BP was low after dialysis yesterday. Pain level: No pain    Objective:     Before right 98%, 75 pulse, /63  After 99%, 72 pulse, /65    Observation:  Decrease noted with red area left lateral thigh. Decreased smaller pink area inferior to above note area. Lower Extremity R L R L R L R L R L   Date  6/7/18 6/7/18 6/28/18 6/28/18 7/24/18 7/24/18 8/16/18 8/16/18 9/6/18 9/6/18       6\" above knee distal patella 83 80.9 83.8 77.9 76.5 77 76 74 72.3 75.0   2\"  above knee  distal patella  89 79.5 83.8 75.4 83.3 74.4 80 69 74.3 68.8   Knee distal patella          57 58.8 57.9 53.5 56.8 52 55.2 52.3 54.7 53.2   5.5\" Below knee distal patella 46.3 47.5 44.5 49.8 41.4 45.2 41.8 46.5 41.3 46.8   10\" Below knee distal patella 34.6 40 34.3 39.7 31.7 36.7 31.7 36.7 31.3 35.9   15\" Below knee distal patella 34 39.1 33.7 36.2 32.4 35.7 30.7 33.3 28.7 32.4           Proximal  malleolus 31.4 32.7 30.4 30.8 30.1 31.8 29.4 30.3 28.6 30.4   Midfoot-Figure 8/Navicular 60.5 62.8 58.9 59.2 57.9 59.8 57.4 59.2 56.9 58.4   Metatarsal heads    23.7 23 23.4 23.7 23.5 23 22.5 23 22.3 22.8                        TOTAL GIRTH 459.5   464.3  450.7 446.2 433.6 435.6 424.7 424.3 410.4 423.7   Overall decrease from initial evaluation left 49.1 cm, right 40.6; 2\" above distal patella decreased by 14.7 cm (largest girth of thigh)    Exercises:    Exercises in bold performed in department today. Items not bolded are carried forward from prior visits for continuity of the record. Exercise/Equipment Resistance/Repetitions Other comments   Education: results of evaluation, exercise and plan of care. toe curls, AP, knee bends and hip IR/ER  1x10 Used sheet on plantar surface of foot.       Therapeutic Exercise/Home Exercise Program: see above    Group Therapy:   N/A    Therapeutic Activity:  N/A      Gait: N/A    Neuromuscular Re-Education: N/A Manual Therapy: 65 minutes  Cleared nodes and performed lymphatic massage to whole leg, bilaterally except bilateral feet. Looked up compression shorts on [employer] and resized pt. PT called and got clarification. Modalities:  N/A    Functional Outcome Measure:   Date recorded: 9/6/18 same as prior  LEFS   29/64=45=55%    Assessment/Treatment/Activity Tolerance:    GCode:  /CK  Patients response to treatment:   [x] Patient tolerated treatment well [] Patient limited by fatigue   [] Patient limited by pain [] Patient limited by other medical complications   [] Other:     Goals:   Progress towards goals:   GOALS  G Code:/CJ  Short Term Goals ( 3   weeks) Long Term Goals (  6 weeks)   1). Initiation of HEP-MET 1). Patient able to don and doff appropriate garment for bilateral LE.   2). 2). Patient able to perform exercises 3-4 times per day with legs elevated. 3). 3). Decrease edema above knee bilaterally by 5-10 cm each LE. MET for both   4). 4). Patient reports able to walk better with less difficulty from edema. 5). 5).   6). 6). Prognosis: [x] Good [] Fair  [] Poor    Patient Requires Follow-up:  [x] Yes  [] No    Plan: [x] Continue per plan of care [] Alter current plan (see comments)   [] Plan of care initiated [] Hold pending MD visit [] Discharge  Continue 2-3 times per week for 2 more weeks with treatment including lymphatic massage bilateral LE, exercise. Discussed with pt about compression shorts secondary to wrapping not working (waist 109.22 cm, hip 120.65 cm, midthigh 82.55 cm.)     Timed Code Treatment Minutes:  65    Total Treatment Minutes: 65    Medicare Cap total YTD: [phone] KX    Electronically signed by:   Yoni Sunshine [id number]

## 2018-09-07 ENCOUNTER — HOSPITAL ENCOUNTER (OUTPATIENT)
Dept: PHYSICAL THERAPY | Age: 53
Setting detail: THERAPIES SERIES
Discharge: HOME | End: 2018-09-07
Payer: COMMERCIAL

## 2018-09-07 PROCEDURE — 97140 MANUAL THERAPY 1/> REGIONS: CPT

## 2018-09-07 NOTE — FLOWSHEET NOTE
N/A    Neuromuscular Re-Education: N/A      Manual Therapy: 65 minutes  Cleared nodes and performed lymphatic massage to whole leg, bilaterally. Modalities:  N/A    Functional Outcome Measure:   Date recorded: 9/6/18 same as prior  LEFS   29/64=45=55%    Assessment/Treatment/Activity Tolerance:    GCode:  /CK  Patients response to treatment:   [x] Patient tolerated treatment well [] Patient limited by fatigue   [] Patient limited by pain [] Patient limited by other medical complications   [] Other:     Goals:   Progress towards goals:   GOALS  G Code:/CJ  Short Term Goals ( 3   weeks) Long Term Goals (  6 weeks)   1). Initiation of HEP-MET 1). Patient able to don and doff appropriate garment for bilateral LE.   2). 2). Patient able to perform exercises 3-4 times per day with legs elevated. 3). 3). Decrease edema above knee bilaterally by 5-10 cm each LE. MET for both   4). 4). Patient reports able to walk better with less difficulty from edema. 5). 5).   6). 6). Prognosis: [x] Good [] Fair  [] Poor    Patient Requires Follow-up:  [x] Yes  [] No    Plan: [x] Continue per plan of care [] Alter current plan (see comments)   [] Plan of care initiated [] Hold pending MD visit [] Discharge  Continue 2-3 times per week for 2 more weeks with treatment including lymphatic massage bilateral LE, exercise. Pt is to order compression shorts secondary to wrapping not working (waist 109.22 cm, hip 120.65 cm, midthigh 81 cm.) pt was given [employer]. com number and instructed to order a 2x. Timed Code Treatment Minutes:  65    Total Treatment Minutes: 65    Medicare Cap total YTD: [phone] KX    Electronically signed by:   Kim Ariza, [id number]

## 2018-09-11 ENCOUNTER — HOSPITAL ENCOUNTER (OUTPATIENT)
Dept: PHYSICAL THERAPY | Age: 53
Setting detail: THERAPIES SERIES
Discharge: HOME | End: 2018-09-11
Payer: COMMERCIAL

## 2018-09-11 PROCEDURE — 97140 MANUAL THERAPY 1/> REGIONS: CPT

## 2018-09-11 NOTE — FLOWSHEET NOTE
N/A    Neuromuscular Re-Education: N/A      Manual Therapy: 52 minutes  Cleared nodes and performed lymphatic massage to whole leg, bilaterally. Modalities:  N/A    Functional Outcome Measure:   Date recorded: 9/6/18 same as prior  LEFS   29/64=45=55%    Assessment/Treatment/Activity Tolerance:    GCode:  /CK  Patients response to treatment:   [x] Patient tolerated treatment well [] Patient limited by fatigue   [] Patient limited by pain [] Patient limited by other medical complications   [] Other:     Goals:   Progress towards goals:   GOALS  G Code:/CJ  Short Term Goals ( 3   weeks) Long Term Goals (  6 weeks)   1). Initiation of HEP-MET 1). Patient able to don and doff appropriate garment for bilateral LE.   2). 2). Patient able to perform exercises 3-4 times per day with legs elevated. 3). 3). Decrease edema above knee bilaterally by 5-10 cm each LE. MET for both   4). 4). Patient reports able to walk better with less difficulty from edema. 5). 5).   6). 6). Prognosis: [x] Good [] Fair  [] Poor    Patient Requires Follow-up:  [x] Yes  [] No    Plan: [x] Continue per plan of care [] Alter current plan (see comments)   [] Plan of care initiated [] Hold pending MD visit [] Discharge  Continue 2-3 times per week for 2 more weeks with treatment including lymphatic massage bilateral LE, exercise. Pt is to order compression shorts secondary to wrapping not working (waist 109.22 cm, hip 120.65 cm, midthigh 81 cm.) pt was given [employer]. com number and instructed to order a 2x, pt states will order sometime this week. Timed Code Treatment Minutes:  52    Total Treatment Minutes: 52    Medicare Cap total YTD: [phone] KX    Electronically signed by:   Kailee Monteiro, [id number]

## 2018-09-13 ENCOUNTER — HOSPITAL ENCOUNTER (OUTPATIENT)
Dept: PHYSICAL THERAPY | Age: 53
Setting detail: THERAPIES SERIES
Discharge: HOME | End: 2018-09-13
Payer: COMMERCIAL

## 2018-09-13 PROCEDURE — 97140 MANUAL THERAPY 1/> REGIONS: CPT

## 2018-09-13 NOTE — FLOWSHEET NOTE
Outpatient Physical Therapy   [x] Daily Treatment Note   [] Progress Note [] Discharge Note    Date:  9/13/2018    Patient Name:  Vipin Gonzalez  \"[id number]\"       YOB: 1965    Restrictions/Precautions: left UE fistula, SVT/AVNRT defibrillator on left side placed in late February or March. ESRD on hemodialysis; dialysis Mon, Wed, Friday. 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 Social support/Environment: Patient reports that he lives alone in an apartment with one flight of steps to get to apartment. Patient sleeps in rocker recliner with legs going down. Patient has bench and supports his legs on bench. Health History reviewed with pt:  SVT has defibrillator on left side. Had 4 ablations, Low blood pressure, A. Fib, morbid obesity; end stage renal disease 3 days of dialysis per week. Patient has difficulty with breathing when laying flat. Gout. Patient takes extra strength tylenol. Medication in addition to list in chart Multaq 400 mg, metoprolol 25 mg, Midodrine 10 mg 3 times per day, IC Allopurinol 100 mg. Patient went to wound care for left LE secondary to wound on posterior aspect; patient reports that he has been discharged from wound care. Patient reports no lymph nodes removed in past.     Plan of care signed (Y/N): Faxed to MD through [address] [place] [address] Rd. Received initial evaluation signed by MD. Received note signed by MD for Circaids. Received note signed for continuation of PT an re-cert.       Progress Note covers period from (if applicable):    []  NA    [x] From 8/16/18   To  9/6/18       Next Progress Note due:  9/20/18    Visit# / total visits:  22/18-24    Plan for

## 2018-09-14 ENCOUNTER — APPOINTMENT (OUTPATIENT)
Dept: PHYSICAL THERAPY | Age: 53
End: 2018-09-14
Payer: COMMERCIAL

## 2018-09-18 ENCOUNTER — HOSPITAL ENCOUNTER (OUTPATIENT)
Dept: PHYSICAL THERAPY | Age: 53
Setting detail: THERAPIES SERIES
Discharge: HOME | End: 2018-09-18
Payer: COMMERCIAL

## 2018-09-18 PROCEDURE — 97140 MANUAL THERAPY 1/> REGIONS: CPT

## 2018-09-18 NOTE — FLOWSHEET NOTE
measurements after 3 days of dialysis: waist 109.22 cm, hip 120.65 cm, midthigh 81 cm.) today's measurement see above. Had looked into reducers and found that they were not covered by insurance; therefore, that is why looking for compression shorts. Timed Code Treatment Minutes:  65    Total Treatment Minutes: 80 (took time to check into compression shorts and returning them)    Medicare Cap total YTD: [id number]    Electronically signed by:   Elayne Alanis, [id number]

## 2018-09-20 ENCOUNTER — HOSPITAL ENCOUNTER (OUTPATIENT)
Dept: PHYSICAL THERAPY | Age: 53
Setting detail: THERAPIES SERIES
Discharge: HOME | End: 2018-09-20
Payer: COMMERCIAL

## 2018-09-20 PROCEDURE — G8979 MOBILITY GOAL STATUS: HCPCS

## 2018-09-20 PROCEDURE — 97140 MANUAL THERAPY 1/> REGIONS: CPT

## 2018-09-20 PROCEDURE — G8978 MOBILITY CURRENT STATUS: HCPCS

## 2018-09-20 NOTE — PROGRESS NOTES
Outpatient Physical Therapy   [x] Daily Treatment Note   [x] Progress Note/MD Note [] Discharge Note    Date:  9/20/2018    Patient Name:  Kalpana Haddad  \"KELLY\"       YOB: 1965    Restrictions/Precautions: left UE fistula, SVT/AVNRT defibrillator on left side placed in late February or March. ESRD on hemodialysis; dialysis Mon, Wed, Friday. 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 Social support/Environment: Patient reports that he lives alone in an apartment with one flight of steps to get to apartment. Patient sleeps in rocker recliner with legs going down. Patient has bench and supports his legs on bench. Health History reviewed with pt:  SVT has defibrillator on left side. Had 4 ablations, Low blood pressure, A. Fib, morbid obesity; end stage renal disease 3 days of dialysis per week. Patient has difficulty with breathing when laying flat. Gout. Patient takes extra strength tylenol. Medication in addition to list in chart Multaq 400 mg, metoprolol 25 mg, Midodrine 10 mg 3 times per day, IC Allopurinol 100 mg. Patient went to wound care for left LE secondary to wound on posterior aspect; patient reports that he has been discharged from wound care. Patient reports no lymph nodes removed in past.     Plan of care signed (Y/N): Faxed to MD through [address] [place] [address] Rd. Received initial evaluation signed by MD. Received note signed by MD for Circaids. Received note signed for continuation of PT an re-cert.       Progress Note covers period from (if applicable):    []  NA    [x] From 9/6/18   To 9/20/18         Next Progress Note due: 10/11/18    Visit# / total visits: Patient limited by fatigue   [] Patient limited by pain [] Patient limited by other medical complications   [] Other:     Goals:   Progress towards goals:   GOALS  G Code:/CJ  Short Term Goals ( 3   weeks) Long Term Goals (  6 weeks)   1). Initiation of HEP-MET 1). Patient able to don and doff appropriate garment for bilateral LE.- Trying to obtain shorts to knees   2). 2). Patient able to perform exercises 3-4 times per day with legs elevated. 3). 3). Decrease edema above knee bilaterally by 5-10 cm each LE. MET for both   4). 4). Patient reports able to walk better with less difficulty from edema. 5). 5).   6). 6). Prognosis: [x] Good [] Fair  [] Poor    Patient Requires Follow-up:  [x] Yes  [] No    Plan: [x] Continue per plan of care [] Alter current plan (see comments)   [] Plan of care initiated [] Hold pending MD visit [] Discharge  Continue 2-3 times per week for 3 more weeks with treatment including manual lymphatic drainage bilateral LE, exercise. Had to send compression shorts back and exchanging for different size. Should receive in one week. Pt to be seen 9/21/18 then wait until   Had looked into reducers and found that they were not covered by insurance; therefore, that is why looking for compression shorts. Timed Code Treatment Minutes:  85    Total Treatment Minutes: 85    Medicare Cap total YTD: [id number] KX    Electronically signed by:   Munir Steel, [id number]    Recommendations:__________________________________________________    Signature:__________________________________________________________

## 2018-09-21 ENCOUNTER — HOSPITAL ENCOUNTER (OUTPATIENT)
Dept: PHYSICAL THERAPY | Age: 53
Setting detail: THERAPIES SERIES
Discharge: HOME | End: 2018-09-21
Payer: COMMERCIAL

## 2018-09-21 PROCEDURE — 97140 MANUAL THERAPY 1/> REGIONS: CPT

## 2018-09-21 NOTE — FLOWSHEET NOTE
Outpatient Physical Therapy   [x] Daily Treatment Note   [] Progress Note [] Discharge Note    Date:  9/21/2018    Patient Name:  Romy Vega  \"FRHMQ\"       YOB: 1965    Restrictions/Precautions: left UE fistula, SVT/AVNRT defibrillator on left side placed in late February or March. ESRD on hemodialysis; dialysis Mon, Wed, Friday. 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 Social support/Environment: Patient reports that he lives alone in an apartment with one flight of steps to get to apartment. Patient sleeps in rocker recliner with legs going down. Patient has bench and supports his legs on bench. Health History reviewed with pt:  SVT has defibrillator on left side. Had 4 ablations, Low blood pressure, A. Fib, morbid obesity; end stage renal disease 3 days of dialysis per week. Patient has difficulty with breathing when laying flat. Gout. Patient takes extra strength tylenol. Medication in addition to list in chart Multaq 400 mg, metoprolol 25 mg, Midodrine 10 mg 3 times per day, IC Allopurinol 100 mg. Patient went to wound care for left LE secondary to wound on posterior aspect; patient reports that he has been discharged from wound care. Patient reports no lymph nodes removed in past.     Plan of care signed (Y/N): Faxed to MD through [address] [place] [address] Rd. Received initial evaluation signed by MD. Received note signed by MD for Circaids. Received note signed for continuation of PT an re-cert.       Progress Note covers period from (if applicable):    []  NA    [x] From 9/6/18   To 9/20/18         Next Progress Note due: 10/11/18    Visit# / total visits:  25/30-33    Plan for Next Session:  Exercise , perform manual lymphatic drainage     Subjective: Pt reports that is doing ok today. Pain level: No pain    Objective:     Before right 98%, 78 pulse, /69 sitting  After 98%, 72 pulse, BP sitting 126/73    Observation: from 9/20/18    Lower Extremity R L R L   Date  6/7/18 6/7/18 9/20/18 9/20/18       6\" above knee distal patella 83 80.9 77.7 76.9   2\"  above knee  distal patella  89 79.5 76.8 71.0   Knee distal patella          57 58.8 55.8 55.7   5.5\" Below knee distal patella 46.3 47.5 43.5 45.8   10\" Below knee distal patella 34.6 40 30.8 34.6   15\" Below knee distal patella 34 39.1 29.3 32.2           Proximal  malleolus 31.4 32.7 28.9 29.9   Midfoot-Figure 8/Navicular 60.5 62.8 57.4 59.0   Metatarsal heads      23.7 23 22.8 22.9             TOTAL GIRTH 459.5   464.3  417.0 428.0     Overall decrease from initial evaluation left 36.3 cm, right 42.5 cm; 2\" above distal patella decreased by left 8.5, right 12.2 cm (largest girth of thigh)     Exercises:    Exercises in bold performed in department today. Items not bolded are carried forward from prior visits for continuity of the record. Exercise/Equipment Resistance/Repetitions Other comments   Education: results of evaluation, exercise and plan of care. toe curls, AP, knee bends and hip IR/ER  1x10 Used sheet on plantar surface of foot. Therapeutic Exercise/Home Exercise Program: see above    Group Therapy:   N/A    Therapeutic Activity:  N/A      Gait: N/A    Neuromuscular Re-Education: N/A      Manual Therapy: 65 minutes  Cleared nodes and performed lymphatic massage to whole leg, bilaterally.       Modalities:  N/A    Functional Outcome Measure:   Date recorded: 9/20/18 same as prior  LEFS   29/64=45=55%    Assessment/Treatment/Activity Tolerance:    GCode:  /CK  Patients response to treatment:   [x] Patient tolerated treatment well [] Patient limited by fatigue   [] Patient limited by

## 2018-09-27 NOTE — PROGRESS NOTES
Physical Therapy  Received note signed by MD dated 9/21/18. POC to continue 2-3 times per week for 3 more weeks.  [address] [place] [address]

## 2018-10-04 ENCOUNTER — HOSPITAL ENCOUNTER (OUTPATIENT)
Dept: PHYSICAL THERAPY | Age: 53
Setting detail: THERAPIES SERIES
Discharge: HOME | End: 2018-10-04
Payer: COMMERCIAL

## 2018-10-04 PROCEDURE — 97140 MANUAL THERAPY 1/> REGIONS: CPT

## 2018-10-04 NOTE — FLOWSHEET NOTE
Outpatient Physical Therapy   [x] Daily Treatment Note   [] Progress Note [] Discharge Note    Date:  10/4/2018    Patient Name:  Faiza Magallanes  \"TGMPC\"       YOB: 1965    Restrictions/Precautions: left UE fistula, SVT/AVNRT defibrillator on left side placed in late February or March. ESRD on hemodialysis; dialysis Mon, Wed, Friday. 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 Social support/Environment: Patient reports that he lives alone in an apartment with one flight of steps to get to apartment. Patient sleeps in rocker recliner with legs going down. Patient has bench and supports his legs on bench. Health History reviewed with pt:  SVT has defibrillator on left side. Had 4 ablations, Low blood pressure, A. Fib, morbid obesity; end stage renal disease 3 days of dialysis per week. Patient has difficulty with breathing when laying flat. Gout. Patient takes extra strength tylenol. Medication in addition to list in chart Multaq 400 mg, metoprolol 25 mg, Midodrine 10 mg 3 times per day, IC Allopurinol 100 mg. Patient went to wound care for left LE secondary to wound on posterior aspect; patient reports that he has been discharged from wound care. Patient reports no lymph nodes removed in past.     Plan of care signed (Y/N): Faxed to MD through [address] [place] [address] Rd. Received initial evaluation signed by MD. Received note signed by MD for Circaids. Received note signed for continuation of PT an re-cert.       Progress Note covers period from (if applicable):    []  NA    [x] From 9/6/18   To 9/20/18         Next Progress Note due: 10/11/18    Visit# / total visits:  26/30-33    Plan

## 2018-10-09 ENCOUNTER — HOSPITAL ENCOUNTER (OUTPATIENT)
Dept: PHYSICAL THERAPY | Age: 53
Setting detail: THERAPIES SERIES
Discharge: HOME | End: 2018-10-09
Payer: COMMERCIAL

## 2018-10-09 PROCEDURE — 97140 MANUAL THERAPY 1/> REGIONS: CPT

## 2018-10-09 NOTE — FLOWSHEET NOTE
Outpatient Physical Therapy   [x] Daily Treatment Note   [] Progress Note [] Discharge Note    Date:  10/9/2018    Patient Name:  Frederic Roche  \"NDEHC\"       YOB: 1965    Restrictions/Precautions: left UE fistula, SVT/AVNRT defibrillator on left side placed in late February or March. ESRD on hemodialysis; dialysis Mon, Wed, Friday. 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 Social support/Environment: Patient reports that he lives alone in an apartment with one flight of steps to get to apartment. Patient sleeps in rocker recliner with legs going down. Patient has bench and supports his legs on bench. Health History reviewed with pt:  SVT has defibrillator on left side. Had 4 ablations, Low blood pressure, A. Fib, morbid obesity; end stage renal disease 3 days of dialysis per week. Patient has difficulty with breathing when laying flat. Gout. Patient takes extra strength tylenol. Medication in addition to list in chart Multaq 400 mg, metoprolol 25 mg, Midodrine 10 mg 3 times per day, IC Allopurinol 100 mg. Patient went to wound care for left LE secondary to wound on posterior aspect; patient reports that he has been discharged from wound care. Patient reports no lymph nodes removed in past.     Plan of care signed (Y/N): Faxed to MD through [address] [place] [address] Rd. Received initial evaluation signed by MD. Received note signed by MD for Circaids. Received note signed for continuation of PT an re-cert.       Progress Note covers period from (if applicable):    [x]  NA    [] From 9/6/18   To 9/20/18         Next Progress Note due: 10/11/18    Visit# / total visits:  27/30-33    Plan

## 2018-10-16 ENCOUNTER — HOSPITAL ENCOUNTER (OUTPATIENT)
Dept: PHYSICAL THERAPY | Age: 53
Setting detail: THERAPIES SERIES
Discharge: HOME | End: 2018-10-16
Payer: COMMERCIAL

## 2018-10-16 PROCEDURE — 97140 MANUAL THERAPY 1/> REGIONS: CPT

## 2018-10-16 NOTE — PROGRESS NOTES
for Next Session:  Exercise , perform manual lymphatic drainage     Subjective: Pt reports that he slept through last appointment. Pt reports that he did not feel well after Wednesday evening. Pain level: 3-4/10 low back; end 3-4/10    Objective:     Before right 98%, 82 pulse, /65 sitting  After 98%, 71 pulse, BP sitting 130/73  Lower Extremity R L R L R L   Date  6/7/18  6/7/18 9/20/18 9/20/18 10/16 10/16       6\" above knee distal patella 83 80.9 77.7 76.9 70.3 75.3   2\"  above knee  distal patella  89 79.5 76.8 71.0 68.7 64.5   Knee distal patella          57 58.8 55.8 55.7 53.4 53.4   5.5\" Below knee distal patella 46.3 47.5 43.5 45.8 42.8 46.9   10\" Below knee distal patella 34.6 40 30.8 34.6 30.4 35.4   15\" Below knee distal patella 34 39.1 29.3 32.2 28.3 31.7           Proximal  malleolus 31.4 32.7 28.9 29.9 28.5 30.4   Midfoot-Figure 8/Navicular 60.5 62.8 57.4 59.0 55.8 58.3   Metatarsal  heads      23.7 23 22.8 22.9 22.4 22.8                 TOTAL GIRTH 459.5   464.3  417.0 428.0 400.6 418.7   Decreased left LE by 45.6 cm and right LE by 58.9 cm overall. Decreased 2\" above knee left LE 15 cm and right 20.3 cm; 6\" above knee left 5.6 cm, right 12.7 cm    Exercises:    Exercises in bold performed in department today. Items not bolded are carried forward from prior visits for continuity of the record. Exercise/Equipment Resistance/Repetitions Other comments   Education: results of evaluation, exercise and plan of care. toe curls, AP, knee bends and hip IR/ER  1x10 Used sheet on plantar surface of foot. 2\" above knee distal patella 73.3 cm right LE     Therapeutic Exercise/Home Exercise Program: see above    Group Therapy:   N/A    Therapeutic Activity:  N/A      Gait: N/A    Neuromuscular Re-Education: N/A      Manual Therapy: 55 minutes  Cleared nodes and performed lymphatic massage to whole leg, bilaterally.  Took measurements prior and at end pt donned compression biker shorts

## 2018-10-23 ENCOUNTER — HOSPITAL ENCOUNTER (OUTPATIENT)
Dept: PHYSICAL THERAPY | Age: 53
Setting detail: THERAPIES SERIES
Discharge: HOME | End: 2018-10-23
Payer: COMMERCIAL

## 2018-10-23 NOTE — PROGRESS NOTES
Physical Therapy  Pt arrived for appointment. Pt stated that after last treatment he did ok with compression shorts and swell spot until after he got home from work. Pt stated that the band in the stomach area bothered him when he was sitting at home so he had to take it off. Continued subjective with pt and pt stated that he noticed a bump/knot in supraclavicular region on Friday. Pt went to dialysis on Monday and nursing stated that it would be ok. Area still present today, discussed with pt the concerns performing lymphatic drainage secondary moving fluid and to having knot in supraclavicular region. Instructed pt to follow up with PCP secondary to bump/knot still present. Pt to called PT before next visit if still present. Appointment cancelled by department secondary to above.   [address]

## 2018-10-29 NOTE — PROGRESS NOTES
Physical Therapy  Patient was called to hold PT until he sees vascular MD on 10/31/18. Next visit confirmed. Patient will call us after MD appointment with update.

## 2018-10-30 ENCOUNTER — HOSPITAL ENCOUNTER (OUTPATIENT)
Dept: PHYSICAL THERAPY | Age: 53
Setting detail: THERAPIES SERIES
Discharge: HOME | End: 2018-10-30
Payer: COMMERCIAL

## 2018-11-13 ENCOUNTER — HOSPITAL ENCOUNTER (OUTPATIENT)
Dept: PHYSICAL THERAPY | Age: 53
Setting detail: THERAPIES SERIES
Discharge: HOME | End: 2018-11-13
Payer: COMMERCIAL

## 2018-11-13 PROCEDURE — G8978 MOBILITY CURRENT STATUS: HCPCS

## 2018-11-13 PROCEDURE — G8979 MOBILITY GOAL STATUS: HCPCS

## 2018-11-13 PROCEDURE — 97140 MANUAL THERAPY 1/> REGIONS: CPT

## 2021-05-05 NOTE — PROGRESS NOTES
Physical Therapy  PATIENT CALLED ON 05/05/21 AND STATED THAT HE NEEDED TO CANCEL APPOINTMENT ON 5/6/21 DUE TO HAVING TO TAKE SISTER TO HER SURGERY, PATIENT STATED HE WOULD CALL BACK ON 5/6/21 TO RESCHEDULE

## 2021-05-06 ENCOUNTER — HOSPITAL ENCOUNTER (OUTPATIENT)
Dept: PHYSICAL THERAPY | Age: 56
Setting detail: THERAPIES SERIES
Discharge: HOME | End: 2021-05-06
Payer: COMMERCIAL

## 2023-03-01 NOTE — DISCHARGE INSTRUCTIONS
[address] [facility] Physician Orders and Discharge [address] [facility]  [address], [address]  [place], [facility]  Telephone: [phone]      Fax: [phone]      Your home care company:   . Your wound-care supplies will be provided by: Jed Nolen NAME:  Gustavo Muller   YOB: 1965  PRIMARY DIAGNOSIS FOR WOUND CARE CENTER:  Lymphedema . Wound cleansing:   Do not scrub or use excessive force. Wash hands with soap and water before and after dressing changes. Prior to applying a clean dressing, cleanse wound with normal saline, wound cleanser, or mild soap and water. Ask your physician or nurse before getting the wound(s) wet in the shower. Wound care for home:    LEFT LOWER POSTERIOR LEG    triad 80% AND gentamycin 20%  Calamine coflex- first layer  Large optilock  Coban-2nd layer  Leave in place for the week     Please note, all wounds (unless stated otherwise here) were mechanically debrided at the time of cleansing here in the wound-care center today, so a small amount of pain, drainage or bleeding from that process might be expected, and is normal.     All products for home use, including multiple products for a single wound if applicable, are medically necessary in order to achieve the best chance at timely wound healing. See provider documentation for details if needed. Substituted dressings applied in the [address] [place] [address],[address] today, if applicable:        New orders for this week (labs, imaging, medications, etc.):    Increase protein in diet   Cont to use compression pumps  2-3 times daily for 3-60 min     Your physician has ordered Compression for treatment of venous ulcers, venous insufficiency,and/or Lymphedema. Calamine coflex   Is a   2  Layer wrap- do not remove until your next scheduled visit in [address] [place] [address],[address]- do not get wet.        * If your wrap falls down, becomes painful or you have decreased sensation, and/or excessive drainage- please call the [address] [place] [address],[address] for RN visit to get your compression wrap changed   *You need to exercice as tolerated- walking is good   * Elevate your legs preferrably above level of your heart when sitting as much as possible   * The Goal of this therapy is to reduce Edema and get into long term compression garments to control venous insufficiency, lymphedema and reduce occurrence of venous ulcers     Additional instructions for specific diagnoses:    WOUND REASSESSMENT 3-14-23    F/U Appointment is with Dr. Cordero in 1 week, on    3-21-23      at                       .     Your nurse  is DARLING Beal    If we applied slip-resistant hospital socks today, be sure to remove them at least once a day to inspect your toes or feet, even if you're not changing the wraps or dressings underneath. If you see anything concerning (redness, excess moisture, etc), please call and let us know right away.    Should you experience any significant changes in your wound(s) (including redness, increased warmth, increased pain, increased drainage, odor, or fever) or have questions about your wound care, please contact the [facility] at [phone] Monday-Thursday from 8:00 am - 4:30 pm, or Friday from 8:00 am - 2:30 pm.  If you need help with your wound outside these hours and cannot wait until we are again available, contact your home-care company (if applicable), your PCP, or go to the nearest emergency room.

## 2023-03-07 ENCOUNTER — HOSPITAL ENCOUNTER (OUTPATIENT)
Dept: WOUND CARE | Age: 58
Discharge: HOME | End: 2023-03-07
Payer: COMMERCIAL

## 2023-03-07 VITALS
HEART RATE: 66 BPM | RESPIRATION RATE: 18 BRPM | DIASTOLIC BLOOD PRESSURE: 65 MMHG | SYSTOLIC BLOOD PRESSURE: 94 MMHG | BODY MASS INDEX: 34.75 KG/M2 | TEMPERATURE: 97.7 F | WEIGHT: 248.2 LBS | HEIGHT: 71 IN

## 2023-03-07 DIAGNOSIS — I89.0 LYMPHEDEMA: Primary | ICD-10-CM

## 2023-03-07 DIAGNOSIS — L97.222 NON-PRESSURE CHRONIC ULCER OF LEFT CALF WITH FAT LAYER EXPOSED (HCC): ICD-10-CM

## 2023-03-07 PROCEDURE — 11042 DBRDMT SUBQ TIS 1ST 20SQCM/<: CPT

## 2023-03-07 PROCEDURE — 29581 APPL MULTLAYER CMPRN SYS LEG: CPT

## 2023-03-07 PROCEDURE — 99204 OFFICE O/P NEW MOD 45 MIN: CPT

## 2023-03-07 PROCEDURE — 11045 DBRDMT SUBQ TISS EACH ADDL: CPT

## 2023-03-07 RX ORDER — LIDOCAINE HYDROCHLORIDE 20 MG/ML
JELLY TOPICAL ONCE
OUTPATIENT
Start: 2023-03-07 | End: 2023-03-07

## 2023-03-07 RX ORDER — BACITRACIN ZINC AND POLYMYXIN B SULFATE 500; 1000 [USP'U]/G; [USP'U]/G
OINTMENT TOPICAL ONCE
OUTPATIENT
Start: 2023-03-07 | End: 2023-03-07

## 2023-03-07 RX ORDER — BETAMETHASONE DIPROPIONATE 0.05 %
OINTMENT (GRAM) TOPICAL ONCE
OUTPATIENT
Start: 2023-03-07 | End: 2023-03-07

## 2023-03-07 RX ORDER — BACITRACIN, NEOMYCIN, POLYMYXIN B 400; 3.5; 5 [USP'U]/G; MG/G; [USP'U]/G
OINTMENT TOPICAL ONCE
OUTPATIENT
Start: 2023-03-07 | End: 2023-03-07

## 2023-03-07 RX ORDER — LIDOCAINE HYDROCHLORIDE 40 MG/ML
SOLUTION TOPICAL ONCE
OUTPATIENT
Start: 2023-03-07 | End: 2023-03-07

## 2023-03-07 RX ORDER — GINSENG 100 MG
CAPSULE ORAL ONCE
OUTPATIENT
Start: 2023-03-07 | End: 2023-03-07

## 2023-03-07 RX ORDER — CLOBETASOL PROPIONATE 0.5 MG/G
OINTMENT TOPICAL ONCE
OUTPATIENT
Start: 2023-03-07 | End: 2023-03-07

## 2023-03-07 RX ORDER — GENTAMICIN SULFATE 1 MG/G
OINTMENT TOPICAL ONCE
Status: DISCONTINUED | OUTPATIENT
Start: 2023-03-07 | End: 2023-03-08 | Stop reason: HOSPADM

## 2023-03-07 RX ORDER — HYDROCODONE BITARTRATE AND ACETAMINOPHEN 5; 325 MG/1; MG/1
1 TABLET ORAL EVERY 12 HOURS PRN
COMMUNITY

## 2023-03-07 RX ORDER — LIDOCAINE 40 MG/G
CREAM TOPICAL ONCE
Status: DISCONTINUED | OUTPATIENT
Start: 2023-03-07 | End: 2023-03-08 | Stop reason: HOSPADM

## 2023-03-07 RX ORDER — LIDOCAINE 50 MG/G
OINTMENT TOPICAL ONCE
OUTPATIENT
Start: 2023-03-07 | End: 2023-03-07

## 2023-03-07 RX ORDER — GENTAMICIN SULFATE 1 MG/G
OINTMENT TOPICAL ONCE
OUTPATIENT
Start: 2023-03-07 | End: 2023-03-07

## 2023-03-07 RX ORDER — LIDOCAINE 40 MG/G
CREAM TOPICAL ONCE
OUTPATIENT
Start: 2023-03-07 | End: 2023-03-07

## 2023-03-07 NOTE — PROGRESS NOTES
[facility]  Progress Note and Procedure Note      Kanwal Horta  AGE: 57 y.o.   GENDER: male  : 1965  TODAY'S DATE:  3/7/2023    Subjective:     Chief Complaint   Patient presents with    Wound Check         HISTORY of PRESENT ILLNESS HPI     Kanwal Horta is a 57 y.o. male who presents today for wound evaluation.   History of Wound: Patient was having wound for a couple weeks on his left leg.  He admits to long history of wounds in the past.  He is currently ESRD on dialysis.  He denies current nausea, vomiting, fever, chills, shortness of breath or chest pain.  He admits to sleeping in a chair.  He states he does not have money to afford a lift chair.  He does not sleep in his bed because the mattress is too hard.  Wound Pain:  intermittent  Severity:  3 / 10   Wound Type:  venous, arterial, and pressure  Modifying Factors:  edema, venous stasis, lymphedema, chronic pressure, decreased mobility, shear force, obesity, arterial insufficiency, decreased tissue oxygenation, malnutrition, and non-adherence  Associated Signs/Symptoms:  edema and drainage        PAST MEDICAL HISTORY        Diagnosis Date    Atrial fibrillation, chronic (HCC)     AVNRT (AV darci re-entry tachycardia) (HCC)     End stage chronic kidney disease (HCC)     ESRD on hemodialysis (HCC)     Mon-Wed-Fri    Hemodialysis patient (HCC)     Hypotension     Lymph edema     Pneumonia 2012    SVT (supraventricular tachycardia) (HCC)        PAST SURGICAL HISTORY    Past Surgical History:   Procedure Laterality Date    ABLATION OF DYSRHYTHMIC FOCUS  09/15/2015    SVT ablation     CARDIAC DEFIBRILLATOR PLACEMENT Left     2019    DIALYSIS FISTULA CREATION  2013       FAMILY HISTORY    Family History   Problem Relation Age of Onset    Heart Disease Mother     Diabetes Mother     Heart Disease Father     Stroke Father     Stroke Maternal Grandfather        SOCIAL HISTORY    Social History     Tobacco Use     Smoking status: Former     Packs/day: 0.00     Types: Cigars, Cigarettes    Smokeless tobacco: Never    Tobacco comments:     cigars   Vaping Use    Vaping Use: Never used   Substance Use Topics    Alcohol use: Yes     Comment: very rarely    Drug use: No       ALLERGIES    Allergies   Allergen Reactions    Vancomycin Other (See Comments)    Bactrim [Sulfamethoxazole-Trimethoprim] Rash    Amoxicillin     Darvon [Propoxyphene] Other (See Comments)     Weakness and unable to stand       MEDICATIONS    Current Outpatient Medications on File Prior to Encounter   Medication Sig Dispense Refill    metoprolol succinate (TOPROL XL) 12.5 mg TB24 Take 12.5 mg by mouth daily      Dronedarone HCl (MULTAQ PO) Take 1 tablet by mouth 2 times daily      HYDROcodone-acetaminophen (NORCO) 5-325 MG per tablet Take 1 tablet by mouth every 6 hours as needed for Pain.      midodrine (PROAMATINE) 5 MG tablet Take 1 tablet by mouth 3 times daily Indications: Dialysis MWF; pt takes 1 tablet 1 hour before dialysis, and 2 tablets mid treatment (Patient taking differently: Take 20 mg by mouth 3 times daily Indications: Patient takes 4 tablets M, W, F. And 3 tablets all other days. Patient states he does them routinely on dialysis days but on off days he only takes when he feels bad d/t low BP.) 90 tablet 3    B complex-vitamin C-folic acid (NEPHRO-LISHA) 1 MG tablet Take 1 tablet by mouth daily (with breakfast). 30 tablet 3    sevelamer (RENVELA) 800 MG tablet Take 4 tablets by mouth 3 times daily (with meals)       Cholecalciferol (VITAMIN D3) 30951 UNITS CAPS Take 50,000 Units by mouth every 30 days. (Patient not taking: Reported on 3/7/2023)      aspirin EC 81 MG EC tablet Take 1 tablet by mouth daily. 30 tablet 0     No current facility-administered medications on file prior to encounter. REVIEW OF SYSTEMS    Pertinent items are noted in HPI.       Objective:      BP 94/65   Pulse 66   Temp 97.7 °F (36.5 °C) (Oral)   Resp 18   Ht 5' 10.5\" (1.791 m)   Wt 248 lb 3.2 oz (112.6 kg)   BMI 35.11 kg/m²     PHYSICAL EXAM    Vascular: Vascular status Impaired  palpable pedal pulses, right DP1/4 and PT1/4, left DP1/4 and PT1/4. CFT 3 seconds digits 1 to 5 bilateral.  Hair growthAbsent  both lower extremities and feet. Skin temperature is warm to warm from pretibial area to distal digits bilateral.  Exam is negative for rubor, pallor, cyanosis or signs of acute vascular compromise bilaterally. Exam is positive for edema bilateral lower extremity. Varicosities Present bilateral lower extremity. Neuro: Neurologic status diminished bilateral with epicritic Present , proprioceptive Present, vibratory sensationPresent and protopathic Present. DTRs Present bilateral Achilles. There were no reproducible neuritic symptoms on exam bilateral feet/ankles. Derm: Ulceration to left leg. Ecchymosis Absent  bilateral feet/foot. Musculoskeletal: Pain with debridement of wound 5/5 muscle strength in/eversion and dorsi/plantarflexion bilateral feet. No gross instability noted. Assessment:     Problem List Items Addressed This Visit       Lymphedema    Non-pressure chronic ulcer of left calf with fat layer exposed ([facility] [address]       Procedure Note    Performed by: Jeri Weinberg DPM    Consent obtained: Yes    Time out taken:  Yes    Pain Control: Anesthetic  Anesthetic: 4% Lidocaine Cream     Debridement:Excisional Debridement    Using curette the wound was sharply debrided    down through and including the removal of epidermis, dermis, and subcutaneous tissue.         Devitalized Tissue Debrided:  fibrin, biofilm, slough, necrotic/eschar, and exudate    Pre Debridement Measurements:  Are located in the Wound Documentation Flow Sheet    Wound #: 1     Post  Debridement Measurements:  Incision 09/15/15 Groin Anterior;Right (Active)   Number of days: 3790       Incision 09/15/15 Groin Left (Active)   Number of days: 1120       Wound 03/07/23 #1, left posterior lower leg cluster, venous, full thickness, onset 2/2023 (Active)   Wound Image   03/07/23 1032   Wound Etiology Venous 03/07/23 1032   Wound Cleansed Soap and water 03/07/23 1032   Wound Length (cm) 5.6 cm 03/07/23 1032   Wound Width (cm) 3.7 cm 03/07/23 1032   Wound Depth (cm) 0.1 cm 03/07/23 1032   Wound Surface Area (cm^2) 20.72 cm^2 03/07/23 1032   Wound Volume (cm^3) 2.072 cm^3 03/07/23 1032   Post-Procedure Length (cm) 5.6 cm 03/07/23 1050   Post-Procedure Width (cm) 3.7 cm 03/07/23 1050   Post-Procedure Depth (cm) 0.1 cm 03/07/23 1050   Post-Procedure Surface Area (cm^2) 20.72 cm^2 03/07/23 1050   Post-Procedure Volume (cm^3) 2.072 cm^3 03/07/23 1050   Distance Tunneling (cm) 0 cm 03/07/23 1032   Undermining Maxium Distance (cm) 0 03/07/23 1032   Wound Assessment Pink/red;Slough 03/07/23 1032   Drainage Amount Moderate 03/07/23 1032   Drainage Description Serous 03/07/23 1032   Odor None 03/07/23 1032   Yara-wound Assessment Fragile 03/07/23 1032   Number of days: 0           Total Surface Area Debrided:  20.72 sq cm     Percentage of wound debrided 100%    Bleeding:  Minimal    Hemostasis Achieved:  by pressure    Procedural Pain:  1  / 10     Post Procedural Pain:  0 / 10     Response to treatment:  Well tolerated by patient. Plan:   Patient examined and evaluated  Wound sharply debrided without incident  Encourage protein intake  Keep legs elevated at all times when not active  Recommend the patient find an appropriate way to elevate his legs. He is encouraged to seek donated items to help him either with getting in his bed or elevating his legs including a lift chair or stepstool. The nature of the patient's condition was explained in depth.  The patient was informed that their compliance to the treatment plan is paramount to successful healing and prevention of further ulceration and/or infection     Discharge Treatment      Written Patient Discharge Instructions Given Electronically signed by Snehal Marlow DPM on 3/7/2023 at 11:01 AM

## 2023-03-08 NOTE — DISCHARGE INSTRUCTIONS
[address] [facility] Physician Orders and Discharge [address] [facility]  [address], [address]  [place] [facility]  Telephone: [phone]      Fax: [phone]        Your home care company:    . Your wound-care supplies will be provided by: Amanda Vega NAME:  Kassie Baldwin   YOB: 1965  PRIMARY DIAGNOSIS FOR WOUND CARE CENTER:  Lymphedema . Wound cleansing:   Do not scrub or use excessive force. Wash hands with soap and water before and after dressing changes. Prior to applying a clean dressing, cleanse wound with normal saline, wound cleanser, or mild soap and water. Ask your physician or nurse before getting the wound(s) wet in the shower. Wound care for home:     LEFT LOWER POSTERIOR LEG     triad 80% AND gentamycin 20%  Calamine coflex- first layer  Large optilock  Coban-2nd layer  Leave in place for the week      Please note, all wounds (unless stated otherwise here) were mechanically debrided at the time of cleansing here in the wound-care center today, so a small amount of pain, drainage or bleeding from that process might be expected, and is normal.      All products for home use, including multiple products for a single wound if applicable, are medically necessary in order to achieve the best chance at timely wound healing. See provider documentation for details if needed. Substituted dressings applied in the [address] today, if applicable:           New orders for this week (labs, imaging, medications, etc.):     Increase protein in diet   Cont to use compression pumps  2-3 times daily for 3-60 min     Your physician has ordered Compression for treatment of venous ulcers, venous insufficiency,and/or Lymphedema. Calamine coflex   Is a   2  Layer wrap- do not remove until your next scheduled visit in [address] [place] [address] Avenue,[address]- do not get wet.         * If your wrap falls down, becomes painful or you have decreased sensation, and/or excessive drainage- please call the [address] [place] [address],[address] for RN visit to get your compression wrap changed   *You need to exercice as tolerated- walking is good   * Elevate your legs preferrably above level of your heart when sitting as much as possible   * The Goal of this therapy is to reduce Edema and get into long term compression garments to control venous insufficiency, lymphedema and reduce occurrence of venous ulcers      Additional instructions for specific diagnoses:          F/U Appointment is with Dr. Eryn Valencia in 1 week, on    3-21-23      at                       . Your nurse  is Dianna Wilson RN     If we applied slip-resistant hospital socks today, be sure to remove them at least once a day to inspect your toes or feet, even if you're not changing the wraps or dressings underneath. If you see anything concerning (redness, excess moisture, etc), please call and let us know right away. Should you experience any significant changes in your wound(s) (including redness, increased warmth, increased pain, increased drainage, odor, or fever) or have questions about your wound care, please contact the [facility] at [phone] Monday-Thursday from 8:00 am - 4:30 pm, or Friday from 8:00 am - 2:30 pm.  If you need help with your wound outside these hours and cannot wait until we are again available, contact your home-care company (if applicable), your PCP, or go to the nearest emergency room.

## 2023-03-14 ENCOUNTER — HOSPITAL ENCOUNTER (OUTPATIENT)
Dept: WOUND CARE | Age: 58
Discharge: HOME | End: 2023-03-14

## 2023-03-14 DIAGNOSIS — L97.222 NON-PRESSURE CHRONIC ULCER OF LEFT CALF WITH FAT LAYER EXPOSED (HCC): Primary | ICD-10-CM

## 2023-03-15 NOTE — DISCHARGE INSTRUCTIONS
please call the [facility] for RN visit to get your compression wrap changed   *You need to exercice as tolerated- walking is good   * Elevate your legs preferrably above level of your heart when sitting as much as possible   * The Goal of this therapy is to reduce Edema and get into long term compression garments to control venous insufficiency, lymphedema and reduce occurrence of venous ulcers      Additional instructions for specific diagnoses:           F/U Appointment is with Dr. Miguel Allred in 1 week, on    3-28-23      at                       . Your nurse  is Becca Zelaya RN     If we applied slip-resistant hospital socks today, be sure to remove them at least once a day to inspect your toes or feet, even if you're not changing the wraps or dressings underneath. If you see anything concerning (redness, excess moisture, etc), please call and let us know right away. Should you experience any significant changes in your wound(s) (including redness, increased warmth, increased pain, increased drainage, odor, or fever) or have questions about your wound care, please contact the [facility] at [phone] Monday-Thursday from 8:00 am - 4:30 pm, or Friday from 8:00 am - 2:30 pm.  If you need help with your wound outside these hours and cannot wait until we are again available, contact your home-care company (if applicable), your PCP, or go to the nearest emergency room.

## 2023-03-21 ENCOUNTER — HOSPITAL ENCOUNTER (OUTPATIENT)
Dept: WOUND CARE | Age: 58
Discharge: HOME | End: 2023-03-21

## 2023-03-21 DIAGNOSIS — L97.222 NON-PRESSURE CHRONIC ULCER OF LEFT CALF WITH FAT LAYER EXPOSED (HCC): Primary | ICD-10-CM

## 2023-03-28 ENCOUNTER — HOSPITAL ENCOUNTER (OUTPATIENT)
Dept: WOUND CARE | Age: 58
Discharge: HOME | End: 2023-03-28

## 2023-06-22 ENCOUNTER — TRANSCRIBE ORDERS (OUTPATIENT)
Dept: ADMINISTRATIVE | Age: 58
End: 2023-06-22

## 2023-06-22 DIAGNOSIS — R11.2 NAUSEA AND VOMITING, UNSPECIFIED VOMITING TYPE: Primary | ICD-10-CM

## 2023-06-30 ENCOUNTER — HOSPITAL ENCOUNTER (OUTPATIENT)
Dept: CT IMAGING | Age: 58
Discharge: HOME | End: 2023-06-30
Payer: COMMERCIAL

## 2023-06-30 DIAGNOSIS — R11.2 NAUSEA AND VOMITING, UNSPECIFIED VOMITING TYPE: ICD-10-CM

## 2023-06-30 PROCEDURE — 74176 CT ABD & PELVIS W/O CONTRAST: CPT

## 2023-07-11 ENCOUNTER — TELEPHONE (OUTPATIENT)
Dept: CASE MANAGEMENT | Age: 58
End: 2023-07-11

## 2023-07-11 NOTE — TELEPHONE ENCOUNTER
Imaging report CT Chest 6/30/23 with f/u imaging recommendations sent to Oswald Storm MD    [address] [facility]([email or web address])  [email or web address]

## 2024-01-01 ENCOUNTER — HOSPITAL ENCOUNTER (EMERGENCY)
Age: 59
End: 2024-06-09
Attending: STUDENT IN AN ORGANIZED HEALTH CARE EDUCATION/TRAINING PROGRAM
Payer: MEDICARE

## 2024-01-01 DIAGNOSIS — Z66 DNR (DO NOT RESUSCITATE): ICD-10-CM

## 2024-01-01 PROCEDURE — 99285 EMERGENCY DEPT VISIT HI MDM: CPT

## 2024-01-02 ENCOUNTER — HOSPITAL ENCOUNTER (INPATIENT)
Age: 59
LOS: 4 days | Discharge: ANOTHER HOSPITAL | DRG: 308 | End: 2024-01-06
Attending: EMERGENCY MEDICINE | Admitting: INTERNAL MEDICINE
Payer: MEDICARE

## 2024-01-02 ENCOUNTER — APPOINTMENT (OUTPATIENT)
Dept: GENERAL RADIOLOGY | Age: 59
DRG: 308 | End: 2024-01-02
Payer: MEDICARE

## 2024-01-02 ENCOUNTER — APPOINTMENT (OUTPATIENT)
Dept: CT IMAGING | Age: 59
DRG: 308 | End: 2024-01-02
Payer: MEDICARE

## 2024-01-02 DIAGNOSIS — R79.89 ELEVATED TROPONIN: ICD-10-CM

## 2024-01-02 DIAGNOSIS — R07.9 CHEST PAIN, UNSPECIFIED TYPE: Primary | ICD-10-CM

## 2024-01-02 DIAGNOSIS — I48.20 ATRIAL FIBRILLATION, CHRONIC (HCC): ICD-10-CM

## 2024-01-02 DIAGNOSIS — I25.10 ATHEROSCLEROTIC CARDIOVASCULAR DISEASE: ICD-10-CM

## 2024-01-02 DIAGNOSIS — Z99.2 ESRD ON DIALYSIS (HCC): ICD-10-CM

## 2024-01-02 DIAGNOSIS — N18.6 ESRD ON DIALYSIS (HCC): ICD-10-CM

## 2024-01-02 LAB
ALBUMIN SERPL-MCNC: 3.3 G/DL (ref 3.4–5)
ALBUMIN/GLOB SERPL: 0.9 {RATIO} (ref 1.1–2.2)
ALP SERPL-CCNC: 115 U/L (ref 40–129)
ALT SERPL-CCNC: 11 U/L (ref 10–40)
ANION GAP SERPL CALCULATED.3IONS-SCNC: 15 MMOL/L (ref 3–16)
AST SERPL-CCNC: 26 U/L (ref 15–37)
BASE EXCESS BLDV CALC-SCNC: 0.6 MMOL/L (ref -3–3)
BASOPHILS # BLD: 0.1 K/UL (ref 0–0.2)
BASOPHILS NFR BLD: 0.8 %
BILIRUB SERPL-MCNC: 1.2 MG/DL (ref 0–1)
BUN SERPL-MCNC: 57 MG/DL (ref 7–20)
CALCIUM SERPL-MCNC: 9.2 MG/DL (ref 8.3–10.6)
CHLORIDE SERPL-SCNC: 90 MMOL/L (ref 99–110)
CO2 BLDV-SCNC: 26 MMOL/L
CO2 SERPL-SCNC: 25 MMOL/L (ref 21–32)
COHGB MFR BLDV: 4.4 % (ref 0–1.5)
CREAT SERPL-MCNC: 7.8 MG/DL (ref 0.9–1.3)
DEPRECATED RDW RBC AUTO: 17 % (ref 12.4–15.4)
EKG ATRIAL RATE: 110 BPM
EKG DIAGNOSIS: NORMAL
EKG Q-T INTERVAL: 312 MS
EKG QRS DURATION: 128 MS
EKG QTC CALCULATION (BAZETT): 427 MS
EKG R AXIS: -66 DEGREES
EKG T AXIS: 92 DEGREES
EKG VENTRICULAR RATE: 113 BPM
EOSINOPHIL # BLD: 0.4 K/UL (ref 0–0.6)
EOSINOPHIL NFR BLD: 2.7 %
GFR SERPLBLD CREATININE-BSD FMLA CKD-EPI: 7 ML/MIN/{1.73_M2}
GLUCOSE SERPL-MCNC: 119 MG/DL (ref 70–99)
HCO3 BLDV-SCNC: 25.1 MMOL/L (ref 23–29)
HCT VFR BLD AUTO: 47 % (ref 40.5–52.5)
HGB BLD-MCNC: 15.3 G/DL (ref 13.5–17.5)
LYMPHOCYTES # BLD: 1.3 K/UL (ref 1–5.1)
LYMPHOCYTES NFR BLD: 9.8 %
MCH RBC QN AUTO: 31.4 PG (ref 26–34)
MCHC RBC AUTO-ENTMCNC: 32.5 G/DL (ref 31–36)
MCV RBC AUTO: 96.4 FL (ref 80–100)
METHGB MFR BLDV: 0.3 %
MONOCYTES # BLD: 1 K/UL (ref 0–1.3)
MONOCYTES NFR BLD: 7.3 %
NEUTROPHILS # BLD: 10.5 K/UL (ref 1.7–7.7)
NEUTROPHILS NFR BLD: 79.4 %
NT-PROBNP SERPL-MCNC: ABNORMAL PG/ML (ref 0–124)
O2 CT VFR BLDV CALC: 20 VOL %
O2 THERAPY: ABNORMAL
PCO2 BLDV: 40.3 MMHG (ref 40–50)
PH BLDV: 7.41 [PH] (ref 7.35–7.45)
PLATELET # BLD AUTO: 157 K/UL (ref 135–450)
PMV BLD AUTO: 9.3 FL (ref 5–10.5)
PO2 BLDV: 55.3 MMHG (ref 25–40)
POTASSIUM SERPL-SCNC: 5.4 MMOL/L (ref 3.5–5.1)
PROT SERPL-MCNC: 6.8 G/DL (ref 6.4–8.2)
RBC # BLD AUTO: 4.87 M/UL (ref 4.2–5.9)
SAO2 % BLDV: 89 %
SODIUM SERPL-SCNC: 130 MMOL/L (ref 136–145)
TROPONIN, HIGH SENSITIVITY: 118 NG/L (ref 0–22)
TROPONIN, HIGH SENSITIVITY: 141 NG/L (ref 0–22)
WBC # BLD AUTO: 13.2 K/UL (ref 4–11)

## 2024-01-02 PROCEDURE — 99285 EMERGENCY DEPT VISIT HI MDM: CPT

## 2024-01-02 PROCEDURE — 93005 ELECTROCARDIOGRAM TRACING: CPT | Performed by: INTERNAL MEDICINE

## 2024-01-02 PROCEDURE — 85652 RBC SED RATE AUTOMATED: CPT

## 2024-01-02 PROCEDURE — 2060000000 HC ICU INTERMEDIATE R&B

## 2024-01-02 PROCEDURE — 83880 ASSAY OF NATRIURETIC PEPTIDE: CPT

## 2024-01-02 PROCEDURE — 6370000000 HC RX 637 (ALT 250 FOR IP): Performed by: INTERNAL MEDICINE

## 2024-01-02 PROCEDURE — 36415 COLL VENOUS BLD VENIPUNCTURE: CPT

## 2024-01-02 PROCEDURE — 6370000000 HC RX 637 (ALT 250 FOR IP): Performed by: EMERGENCY MEDICINE

## 2024-01-02 PROCEDURE — 6360000002 HC RX W HCPCS: Performed by: EMERGENCY MEDICINE

## 2024-01-02 PROCEDURE — 93010 ELECTROCARDIOGRAM REPORT: CPT | Performed by: INTERNAL MEDICINE

## 2024-01-02 PROCEDURE — 96374 THER/PROPH/DIAG INJ IV PUSH: CPT

## 2024-01-02 PROCEDURE — 93005 ELECTROCARDIOGRAM TRACING: CPT | Performed by: EMERGENCY MEDICINE

## 2024-01-02 PROCEDURE — 71250 CT THORAX DX C-: CPT

## 2024-01-02 PROCEDURE — 85025 COMPLETE CBC W/AUTO DIFF WBC: CPT

## 2024-01-02 PROCEDURE — 84484 ASSAY OF TROPONIN QUANT: CPT

## 2024-01-02 PROCEDURE — 80053 COMPREHEN METABOLIC PANEL: CPT

## 2024-01-02 PROCEDURE — 71046 X-RAY EXAM CHEST 2 VIEWS: CPT

## 2024-01-02 PROCEDURE — 82803 BLOOD GASES ANY COMBINATION: CPT

## 2024-01-02 RX ORDER — ONDANSETRON 4 MG/1
4 TABLET, ORALLY DISINTEGRATING ORAL EVERY 8 HOURS PRN
Status: DISCONTINUED | OUTPATIENT
Start: 2024-01-02 | End: 2024-01-06 | Stop reason: HOSPADM

## 2024-01-02 RX ORDER — HEPARIN SODIUM 5000 [USP'U]/ML
5000 INJECTION, SOLUTION INTRAVENOUS; SUBCUTANEOUS EVERY 8 HOURS SCHEDULED
Status: DISCONTINUED | OUTPATIENT
Start: 2024-01-03 | End: 2024-01-06 | Stop reason: HOSPADM

## 2024-01-02 RX ORDER — HYDROCODONE BITARTRATE AND ACETAMINOPHEN 5; 325 MG/1; MG/1
1 TABLET ORAL EVERY 12 HOURS PRN
Status: DISCONTINUED | OUTPATIENT
Start: 2024-01-02 | End: 2024-01-06 | Stop reason: HOSPADM

## 2024-01-02 RX ORDER — SODIUM CHLORIDE 0.9 % (FLUSH) 0.9 %
5-40 SYRINGE (ML) INJECTION EVERY 12 HOURS SCHEDULED
Status: DISCONTINUED | OUTPATIENT
Start: 2024-01-02 | End: 2024-01-06 | Stop reason: HOSPADM

## 2024-01-02 RX ORDER — ACETAMINOPHEN 325 MG/1
650 TABLET ORAL EVERY 6 HOURS PRN
Status: DISCONTINUED | OUTPATIENT
Start: 2024-01-02 | End: 2024-01-06 | Stop reason: HOSPADM

## 2024-01-02 RX ORDER — ASPIRIN 81 MG/1
81 TABLET, CHEWABLE ORAL DAILY
Status: DISCONTINUED | OUTPATIENT
Start: 2024-01-03 | End: 2024-01-06 | Stop reason: HOSPADM

## 2024-01-02 RX ORDER — SODIUM CHLORIDE 9 MG/ML
INJECTION, SOLUTION INTRAVENOUS PRN
Status: DISCONTINUED | OUTPATIENT
Start: 2024-01-02 | End: 2024-01-06 | Stop reason: HOSPADM

## 2024-01-02 RX ORDER — METOPROLOL SUCCINATE 25 MG/1
12.5 TABLET, EXTENDED RELEASE ORAL DAILY
Status: DISCONTINUED | OUTPATIENT
Start: 2024-01-03 | End: 2024-01-05

## 2024-01-02 RX ORDER — FENTANYL CITRATE 50 UG/ML
50 INJECTION, SOLUTION INTRAMUSCULAR; INTRAVENOUS ONCE
Status: COMPLETED | OUTPATIENT
Start: 2024-01-02 | End: 2024-01-02

## 2024-01-02 RX ORDER — ONDANSETRON 2 MG/ML
4 INJECTION INTRAMUSCULAR; INTRAVENOUS EVERY 6 HOURS PRN
Status: DISCONTINUED | OUTPATIENT
Start: 2024-01-02 | End: 2024-01-06 | Stop reason: HOSPADM

## 2024-01-02 RX ORDER — MIDODRINE HYDROCHLORIDE 5 MG/1
5 TABLET ORAL 3 TIMES DAILY
Status: DISCONTINUED | OUTPATIENT
Start: 2024-01-02 | End: 2024-01-03

## 2024-01-02 RX ORDER — ACETAMINOPHEN 650 MG/1
650 SUPPOSITORY RECTAL EVERY 6 HOURS PRN
Status: DISCONTINUED | OUTPATIENT
Start: 2024-01-02 | End: 2024-01-06 | Stop reason: HOSPADM

## 2024-01-02 RX ORDER — ASPIRIN 325 MG
325 TABLET ORAL ONCE
Status: COMPLETED | OUTPATIENT
Start: 2024-01-02 | End: 2024-01-02

## 2024-01-02 RX ORDER — ATORVASTATIN CALCIUM 40 MG/1
40 TABLET, FILM COATED ORAL NIGHTLY
Status: DISCONTINUED | OUTPATIENT
Start: 2024-01-02 | End: 2024-01-06 | Stop reason: HOSPADM

## 2024-01-02 RX ORDER — POLYETHYLENE GLYCOL 3350 17 G/17G
17 POWDER, FOR SOLUTION ORAL DAILY PRN
Status: DISCONTINUED | OUTPATIENT
Start: 2024-01-02 | End: 2024-01-06 | Stop reason: HOSPADM

## 2024-01-02 RX ORDER — SODIUM CHLORIDE 0.9 % (FLUSH) 0.9 %
5-40 SYRINGE (ML) INJECTION PRN
Status: DISCONTINUED | OUTPATIENT
Start: 2024-01-02 | End: 2024-01-06 | Stop reason: HOSPADM

## 2024-01-02 RX ORDER — MIDODRINE HYDROCHLORIDE 5 MG/1
10 TABLET ORAL ONCE
Status: COMPLETED | OUTPATIENT
Start: 2024-01-02 | End: 2024-01-02

## 2024-01-02 RX ADMIN — MIDODRINE HYDROCHLORIDE 5 MG: 5 TABLET ORAL at 19:56

## 2024-01-02 RX ADMIN — FENTANYL CITRATE 50 MCG: 50 INJECTION, SOLUTION INTRAMUSCULAR; INTRAVENOUS at 16:34

## 2024-01-02 RX ADMIN — HYDROCODONE BITARTRATE AND ACETAMINOPHEN 1 TABLET: 5; 325 TABLET ORAL at 23:20

## 2024-01-02 RX ADMIN — MIDODRINE HYDROCHLORIDE 10 MG: 5 TABLET ORAL at 18:21

## 2024-01-02 RX ADMIN — ASPIRIN 325 MG: 325 TABLET ORAL at 18:06

## 2024-01-02 ASSESSMENT — PAIN DESCRIPTION - LOCATION
LOCATION: CHEST

## 2024-01-02 ASSESSMENT — PAIN DESCRIPTION - DESCRIPTORS
DESCRIPTORS: ACHING;DULL
DESCRIPTORS: DULL;ACHING
DESCRIPTORS: ACHING
DESCRIPTORS: ACHING

## 2024-01-02 ASSESSMENT — PAIN SCALES - GENERAL
PAINLEVEL_OUTOF10: 3
PAINLEVEL_OUTOF10: 5
PAINLEVEL_OUTOF10: 5
PAINLEVEL_OUTOF10: 3

## 2024-01-02 ASSESSMENT — PAIN DESCRIPTION - ORIENTATION: ORIENTATION: MID

## 2024-01-02 ASSESSMENT — ENCOUNTER SYMPTOMS
SHORTNESS OF BREATH: 0
VOMITING: 0
BACK PAIN: 0
COUGH: 0
ABDOMINAL PAIN: 0
NAUSEA: 0

## 2024-01-02 ASSESSMENT — LIFESTYLE VARIABLES
HOW OFTEN DO YOU HAVE A DRINK CONTAINING ALCOHOL: NEVER
HOW MANY STANDARD DRINKS CONTAINING ALCOHOL DO YOU HAVE ON A TYPICAL DAY: PATIENT DOES NOT DRINK

## 2024-01-02 ASSESSMENT — PAIN - FUNCTIONAL ASSESSMENT
PAIN_FUNCTIONAL_ASSESSMENT: ACTIVITIES ARE NOT PREVENTED
PAIN_FUNCTIONAL_ASSESSMENT: 0-10

## 2024-01-02 NOTE — ED PROVIDER NOTES
Emergency Department Attending Physician Note  Location: [facility] ED  1/2/2024       Pt Name: Kanwal Horta  MRN: [id number]  Birthdate 1965    Date of evaluation: 1/2/2024  Provider: DAJUAN JUÁREZ DO  PCP: Silvia Boswell MD    Note Started: 12:07 PM EST 1/2/24    CHIEF COMPLAINT  Chief Complaint   Patient presents with    Chest Pain     Chest pain intermittently since Sunday. HD patient from [facility].        HISTORY OF PRESENT ILLNESS:  History obtained by patient. Limitations to history : None.     Kanwal Horta is a 58 y.o. male with a significant PMHx of stage renal disease on dialysis, chronic A-fib, watchman and AICD in place, multiple comorbidities listed below, presenting emergency department today with concerns of chest pain.  He says it has been on and off since Sunday, 3 days ago, and got much worse at dialysis today.  Does not really know what makes it better or worse, but says right now it hurts to breathe.  He slightly soft and his blood pressures here, but says he feels no lightheadedness or dizziness.  Patient says he never had chest pain like this before, and says he does not feel like it is a pneumonia.  He says dialysis completed, and he tolerated it well.  No nausea or vomiting.  No cough or congestion.  No significant leg swelling.  Otherwise, says he wants something for pain.  Has no stents, sees cardiology at [facility], but does not want to be transferred to [facility] if not needed.    Nursing Notes were all reviewed and agreed with or any disagreements were addressed in the HPI.      MEDICAL HISTORY  Past Medical History:   Diagnosis Date    Atrial fibrillation, chronic (HCC)     AVNRT (AV darci re-entry tachycardia)     End stage chronic kidney disease (HCC)     ESRD on hemodialysis (HCC)     Mon-Wed-Fri    Hemodialysis patient (HCC)     Hypotension     Lymph edema     Pneumonia 12/12/2012    SVT (supraventricular tachycardia)         SURGICAL HISTORY  Past  leukocytosis of 13.2, this is quite elevated for patient, however no obvious infectious process.  Chest x-ray does not show any pneumonia, and subsequent CT to rule out pneumonia in the setting of chest pain and some shortness of breath, shows no acute pulmonary infiltrate.    No obvious infectious process in the emergency department.    CT of his chest shows no infiltrate, but what is concerning is that it shows a moderate to severe coronary artery disease.    5:55 PM EST  On chart review, patient's last HS trop was at [facility] in Jan 2023; only 27-25. Here is 141 down to 118.  Concern for possible cardiac etiology in this vasculopath with known cardiac disease.    This patient is quite frustrated to be here in the emergency department, and very non-agreeable with anything I am doing; states \"I know you do not know medicine, you only practice medicine,\" and states that \"what I want to die, but I also do not want to stay here.\"    Risk and benefits discussed, and I am quite blunt with patient; I tell him that if he leaves the emergency department he will have to leave AMA, and if not, there is a very high significance that if this is his heart, as he does have elevated troponins as above, although downtrending, he could have a massive heart attack and die.    Ultimately, he does decide to stay.    6:14 PM EST  Spoke with hospitalist, she would like me to consult cardiology to make sure patient is okay to stay with [facility]ace.  Does not have cardiology here, will consult cardiology on-call.  Patient goes to be [place], but again, refuses to be transferred there.    6:26 PM EST  With on-call cardiology, who agrees with patient being admitted here; with downtrending troponins, no chest pain here, no other significant anginal equivalents, start workup here, and then consider transfer for PCI if indicated.    Of note, blood pressure slightly soft right now, has not gotten his home dose of midodrine, will give and  chart was generated in part by using Dragon Dictation system and may contain errors related to that system including errors in grammar, punctuation, and spelling, as well as words and phrases that may be inappropriate. If there are any questions or concerns please feel free to contact the dictating provider for clarification.     FLOR JUÁREZ DO (electronically signed)    Acute Care [facility]         Flor Juárez DO  01/02/24 7945

## 2024-01-02 NOTE — PLAN OF CARE
57 yo male with ESRD on midodrine   Episode of chest pain, now resolved.   CT chest no PE but severe CAD  Pt has LBBB which appears is new  2nd trop is down-trending    PCU  Trend troponin   Cardiology consultation

## 2024-01-03 ENCOUNTER — APPOINTMENT (OUTPATIENT)
Dept: GENERAL RADIOLOGY | Age: 59
DRG: 308 | End: 2024-01-03
Payer: MEDICARE

## 2024-01-03 PROBLEM — I95.9 ARTERIAL HYPOTENSION: Status: ACTIVE | Noted: 2024-01-03

## 2024-01-03 PROBLEM — Z95.810 AICD (AUTOMATIC CARDIOVERTER/DEFIBRILLATOR) PRESENT: Status: ACTIVE | Noted: 2024-01-03

## 2024-01-03 PROBLEM — I48.20 ATRIAL FIBRILLATION, CHRONIC (HCC): Status: ACTIVE | Noted: 2024-01-03

## 2024-01-03 PROBLEM — R07.89 ATYPICAL CHEST PAIN: Status: ACTIVE | Noted: 2024-01-03

## 2024-01-03 PROBLEM — Z99.2 ESRD ON HEMODIALYSIS (HCC): Status: ACTIVE | Noted: 2024-01-03

## 2024-01-03 PROBLEM — H53.8 BLURRED VISION: Status: ACTIVE | Noted: 2024-01-03

## 2024-01-03 PROBLEM — N18.6 ESRD ON HEMODIALYSIS (HCC): Status: ACTIVE | Noted: 2024-01-03

## 2024-01-03 PROBLEM — R79.89 ELEVATED TROPONIN: Status: ACTIVE | Noted: 2024-01-03

## 2024-01-03 PROBLEM — I48.91 ATRIAL FIBRILLATION WITH RAPID VENTRICULAR RESPONSE (HCC): Status: ACTIVE | Noted: 2024-01-03

## 2024-01-03 LAB
ALBUMIN SERPL-MCNC: 3.4 G/DL (ref 3.4–5)
ANION GAP SERPL CALCULATED.3IONS-SCNC: 23 MMOL/L (ref 3–16)
BUN SERPL-MCNC: 70 MG/DL (ref 7–20)
CALCIUM SERPL-MCNC: 10.1 MG/DL (ref 8.3–10.6)
CHLORIDE SERPL-SCNC: 87 MMOL/L (ref 99–110)
CO2 SERPL-SCNC: 20 MMOL/L (ref 21–32)
CREAT SERPL-MCNC: 9.2 MG/DL (ref 0.9–1.3)
CRP SERPL-MCNC: 279.8 MG/L (ref 0–5.1)
DEPRECATED RDW RBC AUTO: 17.2 % (ref 12.4–15.4)
EKG ATRIAL RATE: 101 BPM
EKG ATRIAL RATE: 150 BPM
EKG ATRIAL RATE: 441 BPM
EKG DIAGNOSIS: NORMAL
EKG Q-T INTERVAL: 324 MS
EKG Q-T INTERVAL: 360 MS
EKG Q-T INTERVAL: 384 MS
EKG QRS DURATION: 134 MS
EKG QRS DURATION: 142 MS
EKG QRS DURATION: 150 MS
EKG QTC CALCULATION (BAZETT): 478 MS
EKG QTC CALCULATION (BAZETT): 480 MS
EKG QTC CALCULATION (BAZETT): 518 MS
EKG R AXIS: -64 DEGREES
EKG R AXIS: -66 DEGREES
EKG R AXIS: -83 DEGREES
EKG T AXIS: 70 DEGREES
EKG T AXIS: 76 DEGREES
EKG T AXIS: 97 DEGREES
EKG VENTRICULAR RATE: 106 BPM
EKG VENTRICULAR RATE: 154 BPM
EKG VENTRICULAR RATE: 94 BPM
ERYTHROCYTE [SEDIMENTATION RATE] IN BLOOD BY WESTERGREN METHOD: 40 MM/HR (ref 0–20)
FLUAV RNA RESP QL NAA+PROBE: NOT DETECTED
FLUBV RNA RESP QL NAA+PROBE: NOT DETECTED
GFR SERPLBLD CREATININE-BSD FMLA CKD-EPI: 6 ML/MIN/{1.73_M2}
GLUCOSE BLD-MCNC: 120 MG/DL (ref 70–99)
GLUCOSE SERPL-MCNC: 109 MG/DL (ref 70–99)
HCT VFR BLD AUTO: 48.4 % (ref 40.5–52.5)
HGB BLD-MCNC: 15.2 G/DL (ref 13.5–17.5)
MCH RBC QN AUTO: 31.1 PG (ref 26–34)
MCHC RBC AUTO-ENTMCNC: 31.4 G/DL (ref 31–36)
MCV RBC AUTO: 98.8 FL (ref 80–100)
PERFORMED ON: ABNORMAL
PHOSPHATE SERPL-MCNC: 4.6 MG/DL (ref 2.5–4.9)
PLATELET # BLD AUTO: 178 K/UL (ref 135–450)
PMV BLD AUTO: 9 FL (ref 5–10.5)
POTASSIUM SERPL-SCNC: 5.1 MMOL/L (ref 3.5–5.1)
RBC # BLD AUTO: 4.9 M/UL (ref 4.2–5.9)
SARS-COV-2 RNA RESP QL NAA+PROBE: NOT DETECTED
SODIUM SERPL-SCNC: 130 MMOL/L (ref 136–145)
TROPONIN, HIGH SENSITIVITY: 130 NG/L (ref 0–22)
TROPONIN, HIGH SENSITIVITY: 152 NG/L (ref 0–22)
TROPONIN, HIGH SENSITIVITY: 152 NG/L (ref 0–22)
TSH SERPL DL<=0.005 MIU/L-ACNC: 2.18 UIU/ML (ref 0.27–4.2)
WBC # BLD AUTO: 13.4 K/UL (ref 4–11)

## 2024-01-03 PROCEDURE — 2000000000 HC ICU R&B

## 2024-01-03 PROCEDURE — 84145 PROCALCITONIN (PCT): CPT

## 2024-01-03 PROCEDURE — 6370000000 HC RX 637 (ALT 250 FOR IP): Performed by: INTERNAL MEDICINE

## 2024-01-03 PROCEDURE — 71045 X-RAY EXAM CHEST 1 VIEW: CPT

## 2024-01-03 PROCEDURE — 99233 SBSQ HOSP IP/OBS HIGH 50: CPT | Performed by: INTERNAL MEDICINE

## 2024-01-03 PROCEDURE — 86706 HEP B SURFACE ANTIBODY: CPT

## 2024-01-03 PROCEDURE — 84443 ASSAY THYROID STIM HORMONE: CPT

## 2024-01-03 PROCEDURE — 80069 RENAL FUNCTION PANEL: CPT

## 2024-01-03 PROCEDURE — 6360000002 HC RX W HCPCS: Performed by: INTERNAL MEDICINE

## 2024-01-03 PROCEDURE — 87636 SARSCOV2 & INF A&B AMP PRB: CPT

## 2024-01-03 PROCEDURE — 99223 1ST HOSP IP/OBS HIGH 75: CPT | Performed by: INTERNAL MEDICINE

## 2024-01-03 PROCEDURE — 93308 TTE F-UP OR LMTD: CPT

## 2024-01-03 PROCEDURE — 87150 DNA/RNA AMPLIFIED PROBE: CPT

## 2024-01-03 PROCEDURE — 84484 ASSAY OF TROPONIN QUANT: CPT

## 2024-01-03 PROCEDURE — 6360000002 HC RX W HCPCS

## 2024-01-03 PROCEDURE — 87340 HEPATITIS B SURFACE AG IA: CPT

## 2024-01-03 PROCEDURE — 86140 C-REACTIVE PROTEIN: CPT

## 2024-01-03 PROCEDURE — 2580000003 HC RX 258

## 2024-01-03 PROCEDURE — 87186 SC STD MICRODIL/AGAR DIL: CPT

## 2024-01-03 PROCEDURE — 93005 ELECTROCARDIOGRAM TRACING: CPT | Performed by: INTERNAL MEDICINE

## 2024-01-03 PROCEDURE — 93010 ELECTROCARDIOGRAM REPORT: CPT | Performed by: INTERNAL MEDICINE

## 2024-01-03 PROCEDURE — 2580000003 HC RX 258: Performed by: INTERNAL MEDICINE

## 2024-01-03 PROCEDURE — 87040 BLOOD CULTURE FOR BACTERIA: CPT

## 2024-01-03 PROCEDURE — 5A1D70Z PERFORMANCE OF URINARY FILTRATION, INTERMITTENT, LESS THAN 6 HOURS PER DAY: ICD-10-PCS | Performed by: INTERNAL MEDICINE

## 2024-01-03 PROCEDURE — 36415 COLL VENOUS BLD VENIPUNCTURE: CPT

## 2024-01-03 PROCEDURE — 85027 COMPLETE CBC AUTOMATED: CPT

## 2024-01-03 RX ORDER — PREDNISONE 20 MG/1
40 TABLET ORAL DAILY
Status: DISCONTINUED | OUTPATIENT
Start: 2024-01-04 | End: 2024-01-03

## 2024-01-03 RX ORDER — MORPHINE SULFATE 2 MG/ML
2 INJECTION, SOLUTION INTRAMUSCULAR; INTRAVENOUS EVERY 4 HOURS PRN
Status: DISCONTINUED | OUTPATIENT
Start: 2024-01-03 | End: 2024-01-06 | Stop reason: HOSPADM

## 2024-01-03 RX ORDER — MIDODRINE HYDROCHLORIDE 10 MG/1
10 TABLET ORAL ONCE
Status: COMPLETED | OUTPATIENT
Start: 2024-01-03 | End: 2024-01-03

## 2024-01-03 RX ORDER — MORPHINE SULFATE 2 MG/ML
2 INJECTION, SOLUTION INTRAMUSCULAR; INTRAVENOUS
Status: DISCONTINUED | OUTPATIENT
Start: 2024-01-03 | End: 2024-01-03

## 2024-01-03 RX ORDER — 0.9 % SODIUM CHLORIDE 0.9 %
1000 INTRAVENOUS SOLUTION INTRAVENOUS ONCE
Status: COMPLETED | OUTPATIENT
Start: 2024-01-03 | End: 2024-01-03

## 2024-01-03 RX ORDER — DIGOXIN 0.25 MG/ML
250 INJECTION INTRAMUSCULAR; INTRAVENOUS ONCE
Status: COMPLETED | OUTPATIENT
Start: 2024-01-03 | End: 2024-01-03

## 2024-01-03 RX ORDER — MIDODRINE HYDROCHLORIDE 10 MG/1
10 TABLET ORAL
Status: DISCONTINUED | OUTPATIENT
Start: 2024-01-04 | End: 2024-01-03

## 2024-01-03 RX ORDER — MIDODRINE HYDROCHLORIDE 10 MG/1
10 TABLET ORAL
Status: DISCONTINUED | OUTPATIENT
Start: 2024-01-03 | End: 2024-01-04

## 2024-01-03 RX ORDER — MIDODRINE HYDROCHLORIDE 5 MG/1
5 TABLET ORAL ONCE
Status: COMPLETED | OUTPATIENT
Start: 2024-01-03 | End: 2024-01-03

## 2024-01-03 RX ADMIN — ATORVASTATIN CALCIUM 40 MG: 40 TABLET, FILM COATED ORAL at 21:44

## 2024-01-03 RX ADMIN — METOPROLOL SUCCINATE 12.5 MG: 25 TABLET, EXTENDED RELEASE ORAL at 07:45

## 2024-01-03 RX ADMIN — ACETAMINOPHEN 650 MG: 325 TABLET ORAL at 02:06

## 2024-01-03 RX ADMIN — MIDODRINE HYDROCHLORIDE 5 MG: 5 TABLET ORAL at 07:45

## 2024-01-03 RX ADMIN — HEPARIN SODIUM 5000 UNITS: 5000 INJECTION INTRAVENOUS; SUBCUTANEOUS at 06:10

## 2024-01-03 RX ADMIN — HEPARIN SODIUM 5000 UNITS: 5000 INJECTION INTRAVENOUS; SUBCUTANEOUS at 21:38

## 2024-01-03 RX ADMIN — MIDODRINE HYDROCHLORIDE 10 MG: 10 TABLET ORAL at 14:03

## 2024-01-03 RX ADMIN — MIDODRINE HYDROCHLORIDE 5 MG: 5 TABLET ORAL at 10:40

## 2024-01-03 RX ADMIN — DIGOXIN 250 MCG: 0.25 INJECTION INTRAMUSCULAR; INTRAVENOUS at 08:07

## 2024-01-03 RX ADMIN — MORPHINE SULFATE 2 MG: 2 INJECTION, SOLUTION INTRAMUSCULAR; INTRAVENOUS at 06:11

## 2024-01-03 RX ADMIN — MIDODRINE HYDROCHLORIDE 10 MG: 10 TABLET ORAL at 16:51

## 2024-01-03 RX ADMIN — HYDROCORTISONE SODIUM SUCCINATE 100 MG: 100 INJECTION, POWDER, FOR SOLUTION INTRAMUSCULAR; INTRAVENOUS at 08:07

## 2024-01-03 RX ADMIN — METHYLPREDNISOLONE SODIUM SUCCINATE 60 MG: 125 INJECTION INTRAMUSCULAR; INTRAVENOUS at 06:11

## 2024-01-03 RX ADMIN — SODIUM CHLORIDE 1000 ML: 9 INJECTION, SOLUTION INTRAVENOUS at 08:15

## 2024-01-03 RX ADMIN — Medication 10 ML: at 21:40

## 2024-01-03 RX ADMIN — HEPARIN SODIUM 5000 UNITS: 5000 INJECTION INTRAVENOUS; SUBCUTANEOUS at 14:03

## 2024-01-03 RX ADMIN — DIGOXIN 250 MCG: 0.25 INJECTION INTRAMUSCULAR; INTRAVENOUS at 08:56

## 2024-01-03 RX ADMIN — ASPIRIN 81 MG: 81 TABLET, CHEWABLE ORAL at 07:45

## 2024-01-03 ASSESSMENT — PAIN SCALES - GENERAL
PAINLEVEL_OUTOF10: 0
PAINLEVEL_OUTOF10: 3
PAINLEVEL_OUTOF10: 0
PAINLEVEL_OUTOF10: 6

## 2024-01-03 ASSESSMENT — PAIN - FUNCTIONAL ASSESSMENT
PAIN_FUNCTIONAL_ASSESSMENT: ACTIVITIES ARE NOT PREVENTED
PAIN_FUNCTIONAL_ASSESSMENT: ACTIVITIES ARE NOT PREVENTED

## 2024-01-03 ASSESSMENT — PAIN DESCRIPTION - DESCRIPTORS
DESCRIPTORS: ACHING
DESCRIPTORS: THROBBING

## 2024-01-03 ASSESSMENT — PAIN DESCRIPTION - LOCATION
LOCATION: CHEST
LOCATION: HEAD

## 2024-01-03 ASSESSMENT — PAIN DESCRIPTION - PAIN TYPE: TYPE: ACUTE PAIN

## 2024-01-03 ASSESSMENT — PAIN DESCRIPTION - ORIENTATION: ORIENTATION: MID

## 2024-01-03 ASSESSMENT — PAIN DESCRIPTION - DIRECTION: RADIATING_TOWARDS: L ARM

## 2024-01-03 NOTE — H&P
V2.0  History and Physical      Name:  Kanwal Horta /Age/Sex: 1965  (58 y.o. male)   MRN & CSN:  [id number] & [id number] Encounter Date/Time: 1/3/2024 6:07 AM EST   Location:   PCP: Silvia Boswell MD       Hospital Day: 2    Assessment and Plan:   Kanwal Horta is a 58 y.o. male with pmh of end-stage renal disease on hemodialysis, atrial fibrillation, status post Watchman procedure, history of ventricular arrhythmia, status post AICD, history of hypotension who presents with chest pain.    Hospital Problems             Last Modified POA    * (Principal) Atypical chest pain 1/3/2024 Yes    Chest pain 1/3/2024 Yes    Blurred vision 1/3/2024 Yes    ESRD on hemodialysis (HCC) 1/3/2024 Yes         Principal Problem:    Atypical chest pain  Plan:   Orders entered by Dr. Trevino include cardiology consultation  Morphine as needed for pain    Active Problems:      Blurred vision  Concern for temporal arteritis  Plan:   Solu-Medrol 60 mg IV now followed by prednisone 40 mg daily  Check ESR and CRP  Consider general surgery consult for biopsy particularly if inflammatory markers are elevated.    ESRD on hemodialysis (HCC)  Plan:   Nephrology service consulted for dialysis orders           Disposition:   Current Living situation: Home  Expected Disposition: Home  Estimated D/C: 2 days    Diet Diet NPO   DVT Prophylaxis [] Lovenox, [x]  Heparin, [] SCDs, [] Ambulation,  [] Eliquis, [] Xarelto, [] Coumadin   Code Status Full Code   Surrogate Decision Maker/ POA Unknown     Personally reviewed Lab Studies and Imaging     EKG interpreted personally and results atrial fibrillation with wide complexes suggesting aberranc      History from:     patient, Quality of history:  good historian    History of Present Illness:     Chief Complaint: Chest pain    Kanwal Horta is a 58 y.o. male with pmh of end-stage renal disease on hemodialysis, atrial fibrillation, status post Watchman procedure, history of ventricular

## 2024-01-03 NOTE — PROGRESS NOTES
Pt brought over from PCU at this time. Pt alert and oriented. VSS. BP slightly soft. Pt oriented to room and call light is in reach.

## 2024-01-03 NOTE — ED NOTES
Dr. Prado (cardiology) and Dr. Gardner (Nephrology) already on tx team. Consults completed at this time.

## 2024-01-03 NOTE — PROGRESS NOTES
Telemetry Assignment Communication Form    Patient has orders for continuous telemetry OR pulse oximeter only orders    To be filled out by Clinical    Patient has Admission or Transfer orders and is assigned to Room Number: 322      (Once this top section is completed:  Select \"Route\" and send note to Fax number: [phone]))      ___________________________________________________________________________      To be filled out by CMU    Patient assigned to tele box number: __________________               (to be written in by CMU when telemetry box is assigned to patient)    ___________________________________________________________________________      Bedside RN confirming that the box listed above is in fact the telemetry box number being placed on the patient listed above.        X________________________________________ RN signature        __________________________________________RN assigned to Patient (please print)    _______________ Date    ____________ Time

## 2024-01-03 NOTE — CARE COORDINATION
Case Management Assessment  Initial Evaluation    Date/Time of Evaluation: 1/3/2024 10:38 AM  Assessment Completed by: Nena Ring RN    If patient is discharged prior to next notation, then this note serves as note for discharge by case management.    Patient Name: Kanwal Horta                   YOB: 1965  Diagnosis: Chest pain [R07.9]  Elevated troponin [R79.89]  ESRD on dialysis (HCC) [N18.6, Z99.2]  Atrial fibrillation, chronic (HCC) [I48.20]  Atherosclerotic cardiovascular disease [I25.10]  Chest pain, unspecified type [R07.9]                   Date / Time: 1/2/2024 11:37 AM    Patient Admission Status: Inpatient   Readmission Risk (Low < 19, Mod (19-27), High > 27): Readmission Risk Score: 15.9    Current PCP: Silvia Boswell MD  PCP verified by CM? Yes (taty)    Chart Reviewed: Yes      History Provided by: Patient  Patient Orientation: Alert and Oriented    Patient Cognition: Alert    Hospitalization in the last 30 days (Readmission):  No    If yes, Readmission Assessment in  Navigator will be completed.    Advance Directives:      Code Status: Full Code   Patient's Primary Decision Maker is: Patient Declined (Legal Next of Kin Remains as Decision Maker)      Discharge Planning:    Patient lives with: Other (Comment) (bncc) Type of Home: Long-Term Care  Primary Care Giver: Other (Comment) ([facility])  Patient Support Systems include: Family Members   Current Financial resources: Medicaid  Current community resources: ECF/Home Care  Current services prior to admission: Long Term Acute Care            Current DME:              Type of Home Care services:  None    ADLS  Prior functional level: Assistance with the following:, Bathing, Dressing, Toileting, Cooking, Housework, Shopping, Mobility  Current functional level: Assistance with the following:, Bathing, Dressing, Toileting, Cooking, Housework, Shopping, Mobility    PT AM-PAC:   /24  OT AM-PAC:   /24    Family can provide assistance at DC:  Patient and/or Patient Representative Agree with the Discharge Plan?      Nena Ring RN  Case Management Department  Ph: [phone] Fax: [phone]

## 2024-01-03 NOTE — FLOWSHEET NOTE
01/03/24 0407   Vital Signs   Temp 98 °F (36.7 °C)   Temp Source Oral   Pulse (!) 107   Heart Rate Source Monitor   Respirations 16   BP (!) 80/43   MAP (Calculated) 55   MAP (mmHg) (!) 49   BP Location Left lower arm   BP Method Automatic   Patient Position Semi fowlers   Opioid-Induced Sedation   POSS Score S   Oxygen Therapy   SpO2 98 %   O2 Device None (Room air)     MD notified of Hypotension, NNO at this time.

## 2024-01-03 NOTE — CONSULTS
[facility] Consult Note           Reason for Consult:  End stage kidney disease  Requesting Physician:  Dr. Trevino    Chief Complaint:  Chest pain  History Obtained From:  patient, electronic medical record    History of Present Ilness:    58 year old male with ESKD on HD MWF, Atrial fibrillation s/p Watchman procedure, Ventricular arrythmia s/p ICD presents with chest pain.  We have been asked to assist in further dialysis care.    Last had HD on 12/31 with 4.4 liters removed, post-weight of 117.2 kg.  Denies any shortness of breath, on 2 L NC.  Found to be in Afib with RVR, given digoxin, HR, BP's improved.  +weak, intake reduced, no fevers.    Past Medical History:        Diagnosis Date    Atrial fibrillation, chronic (HCC)     AVNRT (AV darci re-entry tachycardia)     End stage chronic kidney disease (HCC)     ESRD on hemodialysis (HCC)     Mon-Wed-Fri    Hemodialysis patient (HCC)     Hypotension     Lymph edema     Pneumonia 12/12/2012    SVT (supraventricular tachycardia)        Past Surgical History:        Procedure Laterality Date    ABLATION OF DYSRHYTHMIC FOCUS  09/15/2015    SVT ablation     CARDIAC DEFIBRILLATOR PLACEMENT Left     2019    DIALYSIS FISTULA CREATION  01/01/2013       Home Medications:    No current facility-administered medications on file prior to encounter.     Current Outpatient Medications on File Prior to Encounter   Medication Sig Dispense Refill    metoprolol succinate (TOPROL XL) 12.5 mg TB24 Take 1 split-tab by mouth daily      Dronedarone HCl (MULTAQ PO) Take 1 tablet by mouth 2 times daily 400mg BID (Patient not taking: Reported on 1/2/2024)      HYDROcodone-acetaminophen (NORCO) 5-325 MG per tablet Take 1 tablet by mouth every 12 hours as needed for Pain.      midodrine (PROAMATINE) 5 MG tablet Take 1 tablet by mouth 3 times daily Indications: Dialysis MWF; pt takes 1 tablet 1 hour before dialysis, and 2 tablets mid treatment  (Patient taking differently: Take 4 tablets by mouth 3 times daily Indications: Patient takes 4 tablets M, W, F. And 3 tablets all other days. Patient states he does them routinely on dialysis days but on off days he only takes when he feels bad d/t low BP.) 90 tablet 3    B complex-vitamin C-folic acid (NEPHRO-LISHA) 1 MG tablet Take 1 tablet by mouth daily (with breakfast). 30 tablet 3    sevelamer (RENVELA) 800 MG tablet Take 4 tablets by mouth 3 times daily (with meals)      Cholecalciferol (VITAMIN D3) 50911 UNITS CAPS Take 50,000 Units by mouth every 30 days. (Patient not taking: Reported on 3/7/2023)      aspirin EC 81 MG EC tablet Take 1 tablet by mouth daily. 30 tablet 0       Allergies:  Vancomycin, Bactrim [sulfamethoxazole-trimethoprim], Amoxicillin, and Darvon [propoxyphene]    Social History:    Social History     Socioeconomic History    Marital status: Single     Spouse name: Not on file    Number of children: Not on file    Years of education: Not on file    Highest education level: Not on file   Occupational History    Not on file   Tobacco Use    Smoking status: Former     Types: Cigars, Cigarettes    Smokeless tobacco: Never    Tobacco comments:     cigars   Vaping Use    Vaping Use: Never used   Substance and Sexual Activity    Alcohol use: Yes     Comment: very rarely    Drug use: No    Sexual activity: Not Currently   Other Topics Concern    Not on file   Social History Narrative    Not on file     Social Determinants of Health     Financial Resource Strain: Not on file   Food Insecurity: Not on file   Transportation Needs: Not on file   Physical Activity: Not on file   Stress: Not on file   Social Connections: Not on file   Intimate Partner Violence: Not on file   Housing Stability: Not on file       Family History:   Family History   Problem Relation Age of Onset    Heart Disease Mother     Diabetes Mother     Heart Disease Father     Stroke Father     Stroke Maternal Grandfather

## 2024-01-03 NOTE — PROGRESS NOTES
HD nurse left machines outside the room and states someone will be back to dialyze him.     Pt ate dinner well and tolerated. Pt is very demanding of staff and is very specific with his needs. States he doesn't follow a renal diet because it makes him sick. States his sister will be bringing his snacks and tumbler tomorrow.

## 2024-01-03 NOTE — PROGRESS NOTES
Patient has arrived to PCU in stable condition. Patient has low B/P 65/53 see flow sheet. Patient has been oriented to room and call light and denies nay needs at this time.

## 2024-01-03 NOTE — CONSULTS
[facility]   CONSULTATION  [phone]        Reason for Consultation/Chief Complaint: \"I have been asked to see him for afib RVR low BP\"    History of Present Illness:  Kanwal Horta is a 58 y.o. patient who presented to the hospital with complaints of  chest pain across anterior chest which started on 12.24.23.  Pain is like an ache with no radiation to neck or arms. It is worse with deep breath. Pain got better and went to sleep. Next morning 12.25.23 started having pain again which has continued since.  He has no fever cough but became SOB on 1.2.24  He is seen in ED with afib RVR.His BP runs borderline low and has been on midodrine taking 5mg TID  He has non ischemic cardiomyopathy with low EF.  He says he had seizures from amiodarone.  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 He has had Watchman left atrial appendage closure. He is taking Multaq, metoprolol and aspirin. He is taking a beta-blocker. He is unable to tolerate RAAS inhibition due to his CKD. Interrogation of his device shows normal function, 64% A-fib burden and no other significant arrhythmias.   Cardiac Testing    7/28/2023 ECG: atrial fibrillation, non specific IVCD (I have personally reviewed the ECG)   7/2022 Echo EF 35%, mild TR, moderate MR  12/2020 [facility] -EF 35-40, mild TR, moderate MR, small iatrogenic ASD, complete left atrial appendage closure with no leak or thrombus  10/2020 Watchman JAYNE closure  8/2020 [facility] - luminal irregularities  8/2020 ECG - atrial fibrillation with rapid ventricular response, LBBB  7/2018 Echo - EF 55, grade 2 diastolic dysfunction  3/2018 SICD  10/2017 EP study - no inducible SVT, no inducible VT, no AP  7/2017

## 2024-01-03 NOTE — PROGRESS NOTES
IM Progress Note    Admit Date:  1/2/2024  1    Interval history:  admitted for chest pain but noted to have Afibb with RVR this am     Subjective:  Mr. Horta seen up in bed, reports dull aching chest pain that comes and goes since yesterday not associated with sob or perspirations    Rapid RVR this am with HR upto 140 's and low BP in 60's systolic. Pt denies any dizziness or headache or new symptoms  Given digoxin by cardiology and improving    Hx of WPW with ablation and hx of Vtach s.p secondary prevention with AICD   F/w Dr. Bui      Objective:   BP (!) 87/63   Pulse (!) 160   Temp 98 °F (36.7 °C) (Oral)   Resp 23   Ht 1.791 m (5' 10.5\")   Wt 108.8 kg (239 lb 14.4 oz)   SpO2 97%   BMI 33.94 kg/m²     Intake/Output Summary (Last 24 hours) at 1/3/2024 0827  Last data filed at 1/2/2024 2100  Gross per 24 hour   Intake 60 ml   Output --   Net 60 ml       Physical Exam:      General:  middle aged obese male, up in bed  Awake, alert and oriented. Appears to be not in any distress  Mucous Membranes:  Pink , anicteric  Neck: No JVD, no carotid bruit, no thyromegaly  Chest:  Clear to auscultation bilaterally, no added sounds  Left sided lateral pectoral AICD  Cardiovascular:  RRR S1S2 heard, no murmurs or gallops  Abdomen:  Soft, undistended, non tender, no organomegaly, BS present  Extremities: left UE AV fistula   Chronic lymphedema to both LE with no active wounds , chronic skin changes  Atrophy of both LE  Distal pulses well felt  Neurological : grossly normal  Pt non ambulatory , bed bound      Medications:   Scheduled Medications:    sodium chloride  1,000 mL IntraVENous Once    digoxin  250 mcg IntraVENous Once    sodium chloride flush  5-40 mL IntraVENous 2 times per day    aspirin  81 mg Oral Daily    atorvastatin  40 mg Oral Nightly    midodrine  5 mg Oral TID    metoprolol succinate  12.5 mg Oral Daily    heparin (porcine)  5,000 Units SubCUTAneous 3 times per day     I   sodium chloride    amiodarone  - transfer to ICU for Cardioversion if needed but pt had brief Vtach episode and his AICD fired and converted back to NSR with improvement in HR and BP   - tried to transfer to his cardiologist at [facility] but they have no beds  - will monitor in our icu as pt now stable      Hx of WPW sp ablation , hx of v tach s.p AICD  Hx of Afibb s.p watchmans procedure  - f.w DR. Bui at [facility]  - off multaq and off amio for hx of seizures  - now only on toprol xl 12.5 mg daily- resume     ESRD on HD- mild elevated k at 5.4, resolved on repeat  Nephrology consulted for HD    Chronic hypotension - on midodrine 10 mg tid , resume     Obesity noted - weight loss and life style modification along with dietary changes, increased physical activity encouraged    Heparin   Resume diet    Full code          Lj Carreon MD, 1/3/2024 8:27 AM

## 2024-01-03 NOTE — PROGRESS NOTES
Report given to Gina HASTINGS in ICU at [facility], care transferred at this time.

## 2024-01-03 NOTE — ACP (ADVANCE CARE PLANNING)
Advance Care Planning     General Advance Care Planning (ACP) Conversation    Date of Conversation: 1/2/2024  Conducted with: Patient with Decision Making Capacity    Healthcare Decision Maker:  No healthcare decision makers have been documented.  Click here to complete HealthCare Decision Makers including selection of the Healthcare Decision Maker Relationship (ie \"Primary\")   Today we documented Decision Maker(s) consistent with Legal Next of Kin hierarchy.    Content/Action Overview:  DECLINED ACP Conversation - will revisit periodically  Reviewed DNR/DNI and patient elects Full Code (Attempt Resuscitation)        Length of Voluntary ACP Conversation in minutes:  <16 minutes (Non-Billable)    Nena Ring RN

## 2024-01-03 NOTE — FLOWSHEET NOTE
01/03/24 0035   Vital Signs   Pulse 100   Heart Rate Source Monitor   Respirations 18   BP 98/74   MAP (Calculated) 82   MAP (mmHg) 83   BP Location Right lower arm   BP Method Automatic   Patient Position Semi fowlers   Oxygen Therapy   SpO2 98 %   O2 Device None (Room air)     Vital sings taken and stable at this time. Pt A&OX4 with call light within reach.

## 2024-01-03 NOTE — FLOWSHEET NOTE
01/02/24 2100   Vital Signs   Temp 97.8 °F (36.6 °C)   Temp Source Oral   Pulse (!) 141   Heart Rate Source Monitor   Respirations 21   BP (!) 119/108   MAP (Calculated) 112   MAP (mmHg) 114   BP Location Left upper arm   BP Method Automatic   Patient Position Semi fowlers   Pain Assessment   Pain Assessment 0-10   Pain Level 3   Pain Location Chest   Pain Descriptors Dull;Aching   Opioid-Induced Sedation   POSS Score 1   Oxygen Therapy   SpO2 98 %   O2 Device None (Room air)     Shift assessment is complete, see flow sheet. Pt has c/o pain and declines interventions at this time. Pt given ice chips and snack per request. Call light is within reach and pt A&Oxe4 and uses appropriately.

## 2024-01-03 NOTE — PROGRESS NOTES
RRT called overhead for pt. Presentation of low b/p with afib/RVR, 's to 140's. Pt. Currently on 2l n/c, sat 99%. (Pt. Does NOT wear o2 at home). EKG completed.See VS filing for additional VS. Pt. Voicing, \"I gurgle when I try to breathe\". Pt. With hx of HD treatments every M, W, and Fri. Last treatment was Sunday, 12/31. MALLY PINTO and Dr. Prado at pt. Bedside to review POC; new orders. Reviewed POC with shift DARLING Siegel. Jose, RN Clinical

## 2024-01-03 NOTE — PROGRESS NOTES
Dr. Carreon and Dr. Prado have been made aware of patient. Patient stated he got shocked by his defibrillator, EKG Obtained. Patients vitals have been retaken. Patient is now expected to go to ICU. Our ICU is full. Dr. Carreon is requesting to start transfer for ICU at [facility]. Call has been placed.

## 2024-01-03 NOTE — CONSULTS
[facility]                                 Pulmonary/Critical care  Consult /Progress Note :                                                                  CC :atypical chest pain   Patient is being seen at the request of Dr NOLASCO for a consultation for ICU management     HISTORY OF PRESENT ILLNESS:   Kanwal Horta is a 58 y.o. male with ESRD  on hemodialysis, atrial fibrillation, status post Watchman procedure, history of ventricular arrhythmia, status post AICD, history of hypotension who presents with chest pain.     Presented initially on 1/3 to PCU with atypical chest pain usually during HD .  That that resolved on its own.  Monday pain recur and he was admitted last night n.  He says that the pain is worse when he lies flat.  There is no radiation but it does go across the entire top of his chest.  He rates it as 7/10.  There is no associated symptoms.     While in the emergency room patient states that he developed blurred vision.  He also complains of headache.     Patient stated he got shocked by his defibrillator this am and Card order ICU transfer     PAST MEDICAL HISTORY:  Past Medical History:   Diagnosis Date    Atrial fibrillation, chronic (HCC)     AVNRT (AV darci re-entry tachycardia)     End stage chronic kidney disease (HCC)     ESRD on hemodialysis (HCC)     Mon-Wed-Fri    Hemodialysis patient (HCC)     Hypotension     Lymph edema     Pneumonia 12/12/2012    SVT (supraventricular tachycardia)      PAST SURGICAL HISTORY:  Past Surgical History:   Procedure Laterality Date    ABLATION OF DYSRHYTHMIC FOCUS  09/15/2015    SVT ablation     CARDIAC DEFIBRILLATOR PLACEMENT Left     2019    DIALYSIS FISTULA CREATION  01/01/2013       FAMILY HISTORY:  family history includes Diabetes in his mother; Heart Disease in his father and mother; Stroke in his father and maternal grandfather.    SOCIAL HISTORY:   reports

## 2024-01-03 NOTE — ED NOTES
Rapid response called 0736. Patient states he can't breathe, . Charge at bedside upon writers arrival.     Clinical and hospitalist at bedside 0736.

## 2024-01-04 LAB
EKG ATRIAL RATE: 63 BPM
EKG DIAGNOSIS: NORMAL
EKG Q-T INTERVAL: 398 MS
EKG QRS DURATION: 144 MS
EKG QTC CALCULATION (BAZETT): 473 MS
EKG R AXIS: -64 DEGREES
EKG T AXIS: 46 DEGREES
EKG VENTRICULAR RATE: 85 BPM
HBV SURFACE AB SERPL IA-ACNC: <3.5 MIU/ML
HBV SURFACE AG SERPL QL IA: NORMAL
PROCALCITONIN SERPL IA-MCNC: 4.58 NG/ML (ref 0–0.15)
REPORT: NORMAL

## 2024-01-04 PROCEDURE — 6370000000 HC RX 637 (ALT 250 FOR IP): Performed by: INTERNAL MEDICINE

## 2024-01-04 PROCEDURE — 6360000002 HC RX W HCPCS: Performed by: INTERNAL MEDICINE

## 2024-01-04 PROCEDURE — 99233 SBSQ HOSP IP/OBS HIGH 50: CPT | Performed by: INTERNAL MEDICINE

## 2024-01-04 PROCEDURE — 99232 SBSQ HOSP IP/OBS MODERATE 35: CPT | Performed by: INTERNAL MEDICINE

## 2024-01-04 PROCEDURE — 93010 ELECTROCARDIOGRAM REPORT: CPT | Performed by: INTERNAL MEDICINE

## 2024-01-04 PROCEDURE — 2580000003 HC RX 258: Performed by: INTERNAL MEDICINE

## 2024-01-04 PROCEDURE — 2000000000 HC ICU R&B

## 2024-01-04 RX ORDER — MIDODRINE HYDROCHLORIDE 10 MG/1
10 TABLET ORAL 3 TIMES DAILY PRN
Status: DISCONTINUED | OUTPATIENT
Start: 2024-01-04 | End: 2024-01-04

## 2024-01-04 RX ORDER — NEPHROCAP 1 MG
1 CAP ORAL DAILY
Status: DISCONTINUED | OUTPATIENT
Start: 2024-01-04 | End: 2024-01-06 | Stop reason: HOSPADM

## 2024-01-04 RX ORDER — SEVELAMER CARBONATE 800 MG/1
3200 TABLET, FILM COATED ORAL
Status: DISCONTINUED | OUTPATIENT
Start: 2024-01-04 | End: 2024-01-06 | Stop reason: HOSPADM

## 2024-01-04 RX ORDER — MIDODRINE HYDROCHLORIDE 10 MG/1
10 TABLET ORAL 3 TIMES DAILY PRN
Status: DISCONTINUED | OUTPATIENT
Start: 2024-01-04 | End: 2024-01-06 | Stop reason: HOSPADM

## 2024-01-04 RX ORDER — DIGOXIN 125 MCG
125 TABLET ORAL EVERY OTHER DAY
Status: DISCONTINUED | OUTPATIENT
Start: 2024-01-04 | End: 2024-01-06 | Stop reason: HOSPADM

## 2024-01-04 RX ADMIN — Medication 10 ML: at 08:47

## 2024-01-04 RX ADMIN — ASCORBIC ACID, THIAMINE MONONITRATE,RIBOFLAVIN, NIACINAMIDE, PYRIDOXINE HYDROCHLORIDE, FOLIC ACID, CYANOCOBALAMIN, BIOTIN, CALCIUM PANTOTHENATE, 1 MG: 100; 1.5; 1.7; 20; 10; 1; 6000; 150000; 5 CAPSULE, LIQUID FILLED ORAL at 10:59

## 2024-01-04 RX ADMIN — ATORVASTATIN CALCIUM 40 MG: 40 TABLET, FILM COATED ORAL at 20:00

## 2024-01-04 RX ADMIN — DIGOXIN 125 MCG: 125 TABLET ORAL at 08:46

## 2024-01-04 RX ADMIN — HEPARIN SODIUM 5000 UNITS: 5000 INJECTION INTRAVENOUS; SUBCUTANEOUS at 21:06

## 2024-01-04 RX ADMIN — Medication 10 ML: at 19:55

## 2024-01-04 RX ADMIN — HYDROCODONE BITARTRATE AND ACETAMINOPHEN 1 TABLET: 5; 325 TABLET ORAL at 17:35

## 2024-01-04 RX ADMIN — HYDROCODONE BITARTRATE AND ACETAMINOPHEN 1 TABLET: 5; 325 TABLET ORAL at 00:31

## 2024-01-04 RX ADMIN — HEPARIN SODIUM 5000 UNITS: 5000 INJECTION INTRAVENOUS; SUBCUTANEOUS at 13:33

## 2024-01-04 RX ADMIN — SEVELAMER CARBONATE 3200 MG: 800 TABLET, FILM COATED ORAL at 13:33

## 2024-01-04 RX ADMIN — ASPIRIN 81 MG: 81 TABLET, CHEWABLE ORAL at 08:47

## 2024-01-04 RX ADMIN — METOPROLOL SUCCINATE 12.5 MG: 25 TABLET, EXTENDED RELEASE ORAL at 10:59

## 2024-01-04 RX ADMIN — HEPARIN SODIUM 5000 UNITS: 5000 INJECTION INTRAVENOUS; SUBCUTANEOUS at 06:08

## 2024-01-04 RX ADMIN — MIDODRINE HYDROCHLORIDE 10 MG: 10 TABLET ORAL at 08:47

## 2024-01-04 ASSESSMENT — PAIN DESCRIPTION - LOCATION
LOCATION: SHOULDER
LOCATION: CHEST

## 2024-01-04 ASSESSMENT — PAIN SCALES - GENERAL
PAINLEVEL_OUTOF10: 0
PAINLEVEL_OUTOF10: 2
PAINLEVEL_OUTOF10: 5

## 2024-01-04 ASSESSMENT — PAIN DESCRIPTION - DESCRIPTORS: DESCRIPTORS: SHARP

## 2024-01-04 ASSESSMENT — PAIN DESCRIPTION - FREQUENCY: FREQUENCY: INTERMITTENT

## 2024-01-04 ASSESSMENT — PAIN DESCRIPTION - PAIN TYPE: TYPE: ACUTE PAIN

## 2024-01-04 NOTE — FLOWSHEET NOTE
08:00 Pt awake a&o x4, pt refused turn and refused to let RN see back side , Assessment as charted bed alarm on for safety

## 2024-01-04 NOTE — PROGRESS NOTES
[facility] [place] Daily Progress Note      Admit Date:  1/2/2024    Subjective:  Mr. Horta is seen for follow up in ICU  Reason for Consultation/Chief Complaint: \"I have been asked to see him for afib RVR low BP\"   Today denies any complaints no chest pain no shortness of breath he is at RA  He refused his blood draw since labs could not obtain in one stick.  History of Present Illness:  Kanwal Horta is a 58 y.o. patient who presented to the hospital with complaints of  chest pain across anterior chest which started on 12.24.23.  Pain is like an ache with no radiation to neck or arms. It is worse with deep breath. Pain got better and went to sleep. Next morning 12.25.23 started having pain again which has continued since.  He has no fever cough but became SOB on 1.2.24  He is seen in ED with afib RVR.His BP runs borderline low and has been on midodrine taking 5mg TID  He has non ischemic cardiomyopathy with low EF.  He says he had seizures from amiodarone.  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 He has had Watchman left atrial appendage closure. He is taking Multaq, metoprolol and aspirin. He is taking a beta-blocker. He is unable to tolerate RAAS inhibition due to his CKD. Interrogation of his device shows normal function, 64% A-fib burden and no other significant arrhythmias.   Cardiac Testing    7/28/2023 ECG: atrial fibrillation, non specific IVCD (I have personally reviewed the ECG)   7/2022 Echo EF 35%, mild TR, moderate MR  12/2020 [facility] -EF 35-40, mild TR, moderate MR, small iatrogenic ASD, complete left atrial appendage closure with no leak or thrombus  10/2020 Watchman JAYNE closure  8/2020 [facility] - luminal  palpation  Joint:  no active joint inflammation  Musculoskeletal:  negative  Skin:  Warm and dry  Neck:  Negative for JVD and Carotid Bruits.   Chest:  Clear to auscultation, respiration easy  Cardiovascular:  irreg irreg , no murmur, no rub or thrill.  Extremities:   No edema, clubbing, cyanosis,  Neuro: intact    Medications:    midodrine  10 mg Oral TID WC    mupirocin   Each Nostril BID    sodium chloride flush  5-40 mL IntraVENous 2 times per day    aspirin  81 mg Oral Daily    atorvastatin  40 mg Oral Nightly    metoprolol succinate  12.5 mg Oral Daily    heparin (porcine)  5,000 Units SubCUTAneous 3 times per day      sodium chloride       perflutren lipid microspheres, morphine, sodium chloride flush, sodium chloride, ondansetron **OR** ondansetron, acetaminophen **OR** acetaminophen, polyethylene glycol, HYDROcodone-acetaminophen    Lab Data:  CBC:   Recent Labs     01/02/24  1152 01/03/24  0728   WBC 13.2* 13.4*   HGB 15.3 15.2   HCT 47.0 48.4   MCV 96.4 98.8    178     BMP:   Recent Labs     01/02/24  1152 01/03/24  0728   * 130*   K 5.4* 5.1   CL 90* 87*   CO2 25 20*   PHOS  --  4.6   BUN 57* 70*   CREATININE 7.8* 9.2*     LIVER PROFILE:   Recent Labs     01/02/24  1152   AST 26   ALT 11   BILITOT 1.2*   ALKPHOS 115     PT/INR: No results for input(s): \"PROTIME\", \"INR\" in the last 72 hours.  APTT: No results for input(s): \"APTT\" in the last 72 hours.  BNP:  No results for input(s): \"BNP\" in the last 72 hours.  IMAGING:  This is a limited study for atrial fib, EF.   Left ventricular systolic function is reduced with ejection fraction   estimated at 20-25 %.   There is dyskinesis of the apical inferior and apical septal wall segments.   There is severe hypokinesis of the inferoseptal, anteroseptal and inferior   and anterior walls.   The anterolateral, inferolateral and apical lateral walls are mildly   hypokinetic.   Right ventricular systolic function is reduced .Right atrial pressure is    problem.  Sepsis work up discussed with Dr Carreon  H/o adrenal insuff           Assessment:  Patient Active Problem List    Diagnosis Date Noted    Supraventricular tachycardia 01/24/2015    Near syncope 01/02/2013    Lymphedema 03/07/2023    Non-pressure chronic ulcer of left calf with fat layer exposed ([facility]) 03/07/2023    Renal failure 12/08/2012    Atypical chest pain 01/03/2024    Blurred vision 01/03/2024    ESRD on dialysis ([facility]) 01/03/2024    Atrial fibrillation, chronic ([facility]) 01/03/2024    Elevated troponin 01/03/2024    Arterial hypotension 01/03/2024    Atrial fibrillation with rapid ventricular response ([facility]) 01/03/2024    AICD (automatic cardioverter/defibrillator) present 01/03/2024    Chest pain 01/02/2024    Ventricular tachycardia ([facility])     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

## 2024-01-04 NOTE — PROGRESS NOTES
IM Progress Note    Admit Date:  1/2/2024  2    Interval history:  admitted for chest pain but noted to have Afibb with RVR this am       1/3 Rapid RVR this am with HR upto 140 's and low BP in 60's systolic. Pt denies any dizziness or headache or new symptoms  Given digoxin by cardiology and improving    1/4     Subjective:  Mr. Horta seen up in bed, did well with 2 doses of digoxin and HR improved   Had HD overnight and tolerated with intermittent low BP readings    Chest pain resolved but came back with HD temporarily        Hx of WPW with ablation and hx of Vtach s.p secondary prevention with AICD   F/w Dr. Bui      Objective:   BP (!) 83/49   Pulse 79   Temp 97.4 °F (36.3 °C) (Axillary)   Resp 12   Ht 1.778 m (5' 10\")   Wt 113.4 kg (250 lb)   SpO2 96%   BMI 35.87 kg/m²     Intake/Output Summary (Last 24 hours) at 1/4/2024 0735  Last data filed at 1/4/2024 0403  Gross per 24 hour   Intake 282 ml   Output --   Net 282 ml         Physical Exam:      General:  middle aged obese male, up in bed  Awake, alert and oriented. Appears to be not in any distress  Mucous Membranes:  Pink , anicteric  Neck: No JVD, no carotid bruit, no thyromegaly  Chest:  Clear to auscultation bilaterally, no added sounds  Left sided lateral pectoral AICD  Cardiovascular: irregular  S1S2 heard, no murmurs or gallops  Abdomen:  Soft, undistended, non tender, no organomegaly, BS present  Extremities: left UE AV fistula   Chronic lymphedema to both LE with no active wounds , chronic skin changes  Atrophy of both LE  Distal pulses well felt  Neurological : grossly normal  Pt non ambulatory , bed bound      Medications:   Scheduled Medications:    digoxin  125 mcg Oral Every Other Day    midodrine  10 mg Oral TID WC    mupirocin   Each Nostril BID    sodium chloride flush  5-40 mL IntraVENous 2 times per day    aspirin  81 mg Oral Daily    atorvastatin  40 mg Oral Nightly    metoprolol succinate  12.5 mg Oral Daily    heparin (porcine)  midodrine    Chronic hypotension - on midodrine 10 mg tid , resumed    Obesity noted - weight loss and life style modification along with dietary changes, increased physical activity encouraged    Heparin   Resumed diet    Full code    Dc planning in am         Lj Carreon MD, 1/4/2024 7:35 AM

## 2024-01-04 NOTE — PROGRESS NOTES
09:00 Critical care rounds completed @ bedside w/ Dr. Coker. Dr. Coker aware that pt refused AM labs this morning

## 2024-01-04 NOTE — PROGRESS NOTES
Pt resting in bed, request pain medications for 5/10 sharp CP with \"deep inhalation\" per pt. Medicated per PRN mar with [place]. VSS.

## 2024-01-04 NOTE — PROGRESS NOTES
Pt declining am lab blood draw. Spoke with pt at length of why labs are ordered and treatment related to results. Pt agreeable to phlebotomist attempting one draw.  unsuccessful with am lab draw. Pt declining any further lab draws until after talking with MD.

## 2024-01-04 NOTE — PROGRESS NOTES
Pt c/o 4-6/10 sharp CP with inspiration only. States it is \"same as before\" pain that he came in for. Hemodialysis currently in process. SpO2 100% on RA, BP 89/63 map 73, RR 17, HR 88. EKG obtained with A fib. Dr Fuentes notified and responded \"He has both Norco and morphine ordered.\" Pt declined pain medications and wants to wait till HD completed to see how his pain is. Pt instructed to notify nurse of increasing or different pain and if needs pain medication.

## 2024-01-04 NOTE — CONSULTS
[facility]                                 Pulmonary/Critical care  Consult /Progress Note :                                                                  CC :atypical chest pain   Patient is being seen at the request of Dr NOLASCO for a consultation for ICU management     Subjective  Doing much better   No further ICD shock   BP is ok on range 110 and MAP 70    PHYSICAL EXAM:  Blood pressure (!) 83/50, pulse 89, temperature (!) 96.7 °F (35.9 °C), temperature source Temporal, resp. rate 11, height 1.778 m (5' 10\"), weight 113.4 kg (250 lb), SpO2 98 %.' on RA  Gen: No distress.   Eyes: PERRL. No sclera icterus. No conjunctival injection.   ENT: No discharge. Pharynx clear.   Neck: Trachea midline. No obvious mass.    Resp: rhonchi bilateral   GI: Non-tender. Non-distended. No hernia.   Skin: Warm and dry. No nodule on exposed extremities.   Lymph: No cervical LAD. No supraclavicular LAD.   M/S: No cyanosis. No joint deformity. No clubbing.   Neuro: Awake. Alert. Moves all four extremities.   Psych: Oriented x 3. No anxiety.     LABS:  CBC:   Recent Labs     01/02/24  1152 01/03/24  0728   WBC 13.2* 13.4*   HGB 15.3 15.2   HCT 47.0 48.4   MCV 96.4 98.8    178       BMP:   Recent Labs     01/02/24  1152 01/03/24  0728   * 130*   K 5.4* 5.1   CL 90* 87*   CO2 25 20*   PHOS  --  4.6   BUN 57* 70*   CREATININE 7.8* 9.2*       LIVER PROFILE:   Recent Labs     01/02/24  1152   AST 26   ALT 11   BILITOT 1.2*   ALKPHOS 115       PT/INR: No results for input(s): \"PROTIME\", \"INR\" in the last 72 hours.  APTT: No results for input(s): \"APTT\" in the last 72 hours.  UA:No results for input(s): \"NITRITE\", \"COLORU\", \"PHUR\", \"LABCAST\", \"WBCUA\", \"RBCUA\", \"MUCUS\", \"TRICHOMONAS\", \"YEAST\", \"BACTERIA\", \"CLARITYU\", \"SPECGRAV\", \"LEUKOCYTESUR\", \"UROBILINOGEN\", \"BILIRUBINUR\", \"BLOODU\", \"GLUCOSEU\", \"AMORPHOUS\" in the last 72 hours.    Invalid

## 2024-01-04 NOTE — PROGRESS NOTES
4 Eyes Skin Assessment     NAME:  Kanwal Horta  YOB: 1965  MEDICAL RECORD NUMBER:  [id number]    The patient is being assessed for  Shift Handoff    I agree that at least one RN has performed a thorough Head to Toe Skin Assessment on the patient. ALL assessment sites listed below have been assessed.      Areas assessed by both nurses:    Head, Face, Ears, Shoulders, Back, Chest, Arms, Elbows, Hands, Sacrum. Buttock, Coccyx, Ischium, Legs. Feet and Heels, and Under Medical Devices         Does the Patient have a Wound? No noted wound(s)       Archie Prevention initiated by RN: Yes  Wound Care Orders initiated by RN: No    Pressure Injury (Stage 3,4, Unstageable, DTI, NWPT, and Complex wounds) if present, place Wound referral order by RN under : No    New Ostomies, if present place, Ostomy referral order under : No     Nurse 1 eSignature: Electronically signed by Julianna Edward RN on 1/4/24 at 12:23 AM EST    **SHARE this note so that the co-signing nurse can place an eSignature**    Nurse 2 eSignature: Electronically signed by Rima Fischer RN on 1/4/24 at 1:51 AM EST

## 2024-01-04 NOTE — PROGRESS NOTES
[facility] Progress Note           Subjective/   58 y.o. year old male who we are seeing in consultation for ESKD on HD.     HPI:  Last HD on 1/3 with 2.4 liters removed, post-weight of 120.1 kg.  Denies nay shortness of breath.    ROS:  Intake adequate, +weak.    Objective/   GEN:  Chronically ill, BP (!) 89/67   Pulse 86   Temp (!) 96.7 °F (35.9 °C) (Temporal)   Resp 18   Ht 1.778 m (5' 10\")   Wt 113.4 kg (250 lb)   SpO2 93%   BMI 35.87 kg/m²   HEENT: non-icteric, no JVD  CV: S1, S2, irregular, without m/r/g; no LE edema  RESP: CTA B without w/r/r; breathing wnl  ABD: +bs, soft, nt, no hsm  SKIN: warm, no rashes  ACCESS: [facility]    Data/  Recent Labs     01/02/24  1152 01/03/24  0728   WBC 13.2* 13.4*   HGB 15.3 15.2   HCT 47.0 48.4   MCV 96.4 98.8    178     Recent Labs     01/02/24  1152 01/03/24  0728   * 130*   K 5.4* 5.1   CL 90* 87*   CO2 25 20*   GLUCOSE 119* 109*   PHOS  --  4.6   BUN 57* 70*   CREATININE 7.8* 9.2*   LABGLOM 7* 6*       Assessment/     - End stage renal disease - on HD MWF     - Atrial fibrillation with RVR     - NICM wth low EF     - Hyperkalemia    Plan/     - HD per MWF schedule, UF as tolerated,  kg  - Trend labs, bp's, weights    ____________________________________  Abdoul Dawson MD  [facility]  [email or web address]  Office: [phone]

## 2024-01-04 NOTE — PLAN OF CARE
Continuing to monitor pain and discomfort.  Monitoring pain level on scale of 0-10. Non- pharmacological measures encouraged to reduce discomfort/pain.  PRN pain meds administeration continues when/if applicable as ordered by physician.

## 2024-01-04 NOTE — PLAN OF CARE
Pt resting in bed, A&O x4, VSS, SpO2 95% on RA, Hemodialysis in process to LUE fistula. Peripheral IV patent and NSL. Reviewed with pt plan of care for shift and medications, all questions answered, pt voices no concerns. Pt denies CP or SOB at this time.    Problem: Safety - Adult  Goal: Free from fall injury  Outcome: Progressing     Problem: Discharge Planning  Goal: Discharge to home or other facility with appropriate resources  Outcome: Progressing  Flowsheets (Taken 1/3/2024 2000)  Discharge to home or other facility with appropriate resources:   Identify barriers to discharge with patient and caregiver   Arrange for needed discharge resources and transportation as appropriate     Problem: Pain  Goal: Verbalizes/displays adequate comfort level or baseline comfort level  Outcome: Progressing  Flowsheets (Taken 1/3/2024 2001)  Verbalizes/displays adequate comfort level or baseline comfort level:   Encourage patient to monitor pain and request assistance   Assess pain using appropriate pain scale   Administer analgesics based on type and severity of pain and evaluate response   Implement non-pharmacological measures as appropriate and evaluate response     Problem: Respiratory - Adult  Goal: Achieves optimal ventilation and oxygenation  Outcome: Progressing     Problem: Cardiovascular - Adult  Goal: Maintains optimal cardiac output and hemodynamic stability  Outcome: Progressing  Goal: Absence of cardiac dysrhythmias or at baseline  Outcome: Progressing     Problem: Metabolic/Fluid and Electrolytes - Adult  Goal: Hemodynamic stability and optimal renal function maintained  Outcome: Progressing

## 2024-01-04 NOTE — PROGRESS NOTES
Pt pain reassessed at 2/10. Pt denies any change to CP from what it has been since admission. Pt instructed to notify if has change or increasing CP.

## 2024-01-04 NOTE — DIALYSIS
Treatment time: 3.5 hours  Net UF: 2400 ml     Pre weight: 122.5 kg  Post weight:120.1 kg  EDW: 112 kg    Access used: L AVF    Access function: well with  ml/min     Medications or blood products given: None      Regular outpatient schedule: MWF     Summary of response to treatment: Patient tolerated treatment fair with some drop in BP. Patient remained stable throughout entire treatment and upon the dialysis RN exiting the bedside.      Report given to Julianna Edward RN and copy of dialysis treatment record placed in chart, to be scanned into EMR.

## 2024-01-05 PROBLEM — A49.01 STAPH AUREUS INFECTION: Status: ACTIVE | Noted: 2024-01-05

## 2024-01-05 LAB
ALBUMIN SERPL-MCNC: 3 G/DL (ref 3.4–5)
ANION GAP SERPL CALCULATED.3IONS-SCNC: 18 MMOL/L (ref 3–16)
BUN SERPL-MCNC: 79 MG/DL (ref 7–20)
CALCIUM SERPL-MCNC: 8.9 MG/DL (ref 8.3–10.6)
CHLORIDE SERPL-SCNC: 92 MMOL/L (ref 99–110)
CK SERPL-CCNC: 33 U/L (ref 39–308)
CO2 SERPL-SCNC: 22 MMOL/L (ref 21–32)
CREAT SERPL-MCNC: 8.4 MG/DL (ref 0.9–1.3)
DEPRECATED RDW RBC AUTO: 16.7 % (ref 12.4–15.4)
GFR SERPLBLD CREATININE-BSD FMLA CKD-EPI: 7 ML/MIN/{1.73_M2}
GLUCOSE SERPL-MCNC: 147 MG/DL (ref 70–99)
HCT VFR BLD AUTO: 42 % (ref 40.5–52.5)
HGB BLD-MCNC: 13.5 G/DL (ref 13.5–17.5)
MCH RBC QN AUTO: 31.1 PG (ref 26–34)
MCHC RBC AUTO-ENTMCNC: 32.2 G/DL (ref 31–36)
MCV RBC AUTO: 96.7 FL (ref 80–100)
PHOSPHATE SERPL-MCNC: 4.9 MG/DL (ref 2.5–4.9)
PLATELET # BLD AUTO: 154 K/UL (ref 135–450)
PMV BLD AUTO: 9 FL (ref 5–10.5)
POTASSIUM SERPL-SCNC: 5.4 MMOL/L (ref 3.5–5.1)
RBC # BLD AUTO: 4.34 M/UL (ref 4.2–5.9)
SODIUM SERPL-SCNC: 132 MMOL/L (ref 136–145)
WBC # BLD AUTO: 11.5 K/UL (ref 4–11)

## 2024-01-05 PROCEDURE — P9047 ALBUMIN (HUMAN), 25%, 50ML: HCPCS | Performed by: NURSE PRACTITIONER

## 2024-01-05 PROCEDURE — 6360000002 HC RX W HCPCS: Performed by: INTERNAL MEDICINE

## 2024-01-05 PROCEDURE — 99233 SBSQ HOSP IP/OBS HIGH 50: CPT | Performed by: INTERNAL MEDICINE

## 2024-01-05 PROCEDURE — 6370000000 HC RX 637 (ALT 250 FOR IP): Performed by: INTERNAL MEDICINE

## 2024-01-05 PROCEDURE — 2580000003 HC RX 258: Performed by: INTERNAL MEDICINE

## 2024-01-05 PROCEDURE — 93005 ELECTROCARDIOGRAM TRACING: CPT | Performed by: NURSE PRACTITIONER

## 2024-01-05 PROCEDURE — 90935 HEMODIALYSIS ONE EVALUATION: CPT

## 2024-01-05 PROCEDURE — 2060000000 HC ICU INTERMEDIATE R&B

## 2024-01-05 PROCEDURE — 82550 ASSAY OF CK (CPK): CPT

## 2024-01-05 PROCEDURE — 85027 COMPLETE CBC AUTOMATED: CPT

## 2024-01-05 PROCEDURE — 80069 RENAL FUNCTION PANEL: CPT

## 2024-01-05 PROCEDURE — 6360000002 HC RX W HCPCS: Performed by: NURSE PRACTITIONER

## 2024-01-05 PROCEDURE — 36415 COLL VENOUS BLD VENIPUNCTURE: CPT

## 2024-01-05 PROCEDURE — 2580000003 HC RX 258: Performed by: NURSE PRACTITIONER

## 2024-01-05 PROCEDURE — 6370000000 HC RX 637 (ALT 250 FOR IP): Performed by: NURSE PRACTITIONER

## 2024-01-05 RX ORDER — METOPROLOL SUCCINATE 25 MG/1
12.5 TABLET, EXTENDED RELEASE ORAL 3 TIMES DAILY
Status: DISCONTINUED | OUTPATIENT
Start: 2024-01-05 | End: 2024-01-06

## 2024-01-05 RX ORDER — MIDODRINE HYDROCHLORIDE 10 MG/1
10 TABLET ORAL ONCE
Status: COMPLETED | OUTPATIENT
Start: 2024-01-05 | End: 2024-01-05

## 2024-01-05 RX ORDER — LINEZOLID 2 MG/ML
600 INJECTION, SOLUTION INTRAVENOUS EVERY 12 HOURS
Status: DISCONTINUED | OUTPATIENT
Start: 2024-01-05 | End: 2024-01-05

## 2024-01-05 RX ORDER — DEXAMETHASONE SODIUM PHOSPHATE 10 MG/ML
10 INJECTION, SOLUTION INTRAMUSCULAR; INTRAVENOUS ONCE
Status: COMPLETED | OUTPATIENT
Start: 2024-01-05 | End: 2024-01-05

## 2024-01-05 RX ORDER — 0.9 % SODIUM CHLORIDE 0.9 %
250 INTRAVENOUS SOLUTION INTRAVENOUS ONCE
Status: COMPLETED | OUTPATIENT
Start: 2024-01-05 | End: 2024-01-05

## 2024-01-05 RX ORDER — ALBUMIN (HUMAN) 12.5 G/50ML
25 SOLUTION INTRAVENOUS ONCE
Status: COMPLETED | OUTPATIENT
Start: 2024-01-05 | End: 2024-01-05

## 2024-01-05 RX ORDER — LIDOCAINE HYDROCHLORIDE 10 MG/ML
0.4 INJECTION, SOLUTION INFILTRATION; PERINEURAL PRN
Status: DISCONTINUED | OUTPATIENT
Start: 2024-01-05 | End: 2024-01-06 | Stop reason: HOSPADM

## 2024-01-05 RX ADMIN — HEPARIN SODIUM 5000 UNITS: 5000 INJECTION INTRAVENOUS; SUBCUTANEOUS at 13:25

## 2024-01-05 RX ADMIN — ALBUMIN (HUMAN) 25 G: 0.25 INJECTION, SOLUTION INTRAVENOUS at 22:02

## 2024-01-05 RX ADMIN — HEPARIN SODIUM 5000 UNITS: 5000 INJECTION INTRAVENOUS; SUBCUTANEOUS at 05:30

## 2024-01-05 RX ADMIN — ACETAMINOPHEN 650 MG: 325 TABLET ORAL at 23:33

## 2024-01-05 RX ADMIN — MORPHINE SULFATE 2 MG: 2 INJECTION, SOLUTION INTRAMUSCULAR; INTRAVENOUS at 19:42

## 2024-01-05 RX ADMIN — HYDROCODONE BITARTRATE AND ACETAMINOPHEN 1 TABLET: 5; 325 TABLET ORAL at 11:25

## 2024-01-05 RX ADMIN — MIDODRINE HYDROCHLORIDE 10 MG: 10 TABLET ORAL at 07:06

## 2024-01-05 RX ADMIN — ASPIRIN 81 MG: 81 TABLET, CHEWABLE ORAL at 11:25

## 2024-01-05 RX ADMIN — METOPROLOL SUCCINATE 12.5 MG: 25 TABLET, EXTENDED RELEASE ORAL at 13:25

## 2024-01-05 RX ADMIN — SODIUM CHLORIDE 250 ML: 9 INJECTION, SOLUTION INTRAVENOUS at 22:02

## 2024-01-05 RX ADMIN — SEVELAMER CARBONATE 3200 MG: 800 TABLET, FILM COATED ORAL at 11:25

## 2024-01-05 RX ADMIN — MIDODRINE HYDROCHLORIDE 10 MG: 10 TABLET ORAL at 14:40

## 2024-01-05 RX ADMIN — METOPROLOL SUCCINATE 12.5 MG: 25 TABLET, EXTENDED RELEASE ORAL at 11:24

## 2024-01-05 RX ADMIN — MIDODRINE HYDROCHLORIDE 10 MG: 10 TABLET ORAL at 09:20

## 2024-01-05 RX ADMIN — MIDODRINE HYDROCHLORIDE 10 MG: 10 TABLET ORAL at 22:07

## 2024-01-05 RX ADMIN — METOPROLOL SUCCINATE 12.5 MG: 25 TABLET, EXTENDED RELEASE ORAL at 19:42

## 2024-01-05 RX ADMIN — HEPARIN SODIUM 5000 UNITS: 5000 INJECTION INTRAVENOUS; SUBCUTANEOUS at 20:20

## 2024-01-05 RX ADMIN — ASCORBIC ACID, THIAMINE MONONITRATE,RIBOFLAVIN, NIACINAMIDE, PYRIDOXINE HYDROCHLORIDE, FOLIC ACID, CYANOCOBALAMIN, BIOTIN, CALCIUM PANTOTHENATE, 1 MG: 100; 1.5; 1.7; 20; 10; 1; 6000; 150000; 5 CAPSULE, LIQUID FILLED ORAL at 11:25

## 2024-01-05 RX ADMIN — MIDODRINE HYDROCHLORIDE 10 MG: 10 TABLET ORAL at 19:42

## 2024-01-05 RX ADMIN — DEXAMETHASONE SODIUM PHOSPHATE 10 MG: 10 INJECTION, SOLUTION INTRAMUSCULAR; INTRAVENOUS at 23:28

## 2024-01-05 RX ADMIN — SODIUM CHLORIDE 700 MG: 9 INJECTION, SOLUTION INTRAVENOUS at 13:20

## 2024-01-05 ASSESSMENT — PAIN SCALES - GENERAL
PAINLEVEL_OUTOF10: 8
PAINLEVEL_OUTOF10: 6
PAINLEVEL_OUTOF10: 4

## 2024-01-05 ASSESSMENT — PAIN DESCRIPTION - LOCATION
LOCATION: HEAD
LOCATION: ABDOMEN
LOCATION: ABDOMEN;CHEST

## 2024-01-05 ASSESSMENT — PAIN DESCRIPTION - ORIENTATION: ORIENTATION: MID

## 2024-01-05 NOTE — PROGRESS NOTES
[facility]                                 Pulmonary/Critical care  Consult /Progress Note :                                                                  CC :atypical chest pain   Patient is being seen at the request of Dr NOLASCO for a consultation for ICU management     Subjective  Doing much better   No further ICD shock   BP is ok on range 110 and MAP 70  Grow MRSA   Declined vancomycin ,had dov syndrome    PHYSICAL EXAM:  Blood pressure (!) 89/64, pulse (!) 108, temperature 97.2 °F (36.2 °C), resp. rate 18, height 1.778 m (5' 10\"), weight 115.1 kg (253 lb 12 oz), SpO2 94 %.' on RA  Gen: No distress.   Eyes: PERRL. No sclera icterus. No conjunctival injection.   ENT: No discharge. Pharynx clear.   Neck: Trachea midline. No obvious mass.    Resp: rhonchi bilateral   GI: Non-tender. Non-distended. No hernia.   Skin: Warm and dry. No nodule on exposed extremities.   Lymph: No cervical LAD. No supraclavicular LAD.   M/S: No cyanosis. No joint deformity. No clubbing.   Neuro: Awake. Alert. Moves all four extremities.   Psych: Oriented x 3. No anxiety.     LABS:  CBC:   Recent Labs     01/02/24  1152 01/03/24  0728 01/05/24  0730   WBC 13.2* 13.4* 11.5*   HGB 15.3 15.2 13.5   HCT 47.0 48.4 42.0   MCV 96.4 98.8 96.7    178 154       BMP:   Recent Labs     01/02/24  1152 01/03/24  0728 01/05/24  0730   * 130* 132*   K 5.4* 5.1 5.4*   CL 90* 87* 92*   CO2 25 20* 22   PHOS  --  4.6 4.9   BUN 57* 70* 79*   CREATININE 7.8* 9.2* 8.4*       LIVER PROFILE:   Recent Labs     01/02/24  1152   AST 26   ALT 11   BILITOT 1.2*   ALKPHOS 115       PT/INR: No results for input(s): \"PROTIME\", \"INR\" in the last 72 hours.  APTT: No results for input(s): \"APTT\" in the last 72 hours.  UA:No results for input(s): \"NITRITE\", \"COLORU\", \"PHUR\", \"LABCAST\", \"WBCUA\", \"RBCUA\", \"MUCUS\", \"TRICHOMONAS\", \"YEAST\", \"BACTERIA\", \"CLARITYU\", \"SPECGRAV\",

## 2024-01-05 NOTE — PROGRESS NOTES
Telemetry Assignment Communication Form    Patient has orders for continuous telemetry OR pulse oximeter only orders    To be filled out by Clinical    Patient has Admission or Transfer orders and is assigned to [phone]      (Once this top section is completed:  Select \"Route\" and send note to Fax number: [phone]))      ___________________________________________________________________________      To be filled out by CMU    Patient assigned to tele box number: ___[id number]___               (to be written in by CMU when telemetry box is assigned to patient)    ___________________________________________________________________________      Bedside RN confirming that the box listed above is in fact the telemetry box number being placed on the patient listed above.        X________________________________________ RN signature        _________Vicki______________________________RN assigned to Patient (please print)    _______________ Date    ____________ Time

## 2024-01-05 NOTE — PROGRESS NOTES
19:12    Dr. Parvin Gill messaged    1 Aerobic bottle positive :     Abnormal   Gram stain Aerobic bottle:  Gram positive cocci in clusters  resembling Staphylococcus  Organism: Staph aureus DNA Detected Abnormal    Nightshift nurse also updated awaiting orders

## 2024-01-05 NOTE — PROGRESS NOTES
[facility] Progress Note           Subjective/   58 y.o. year old male who we are seeing in consultation for ESKD on HD.     HPI:  Last HD on 1/5 with 2 liters removed, post-weight of 113.1 kg.  Denies any shortness of breath, on RA.    ROS:  Intake adequate, +weak.    Objective/   GEN:  Chronically ill, BP 99/72   Pulse (!) 112   Temp 97.2 °F (36.2 °C)   Resp 17   Ht 1.778 m (5' 10\")   Wt 115.1 kg (253 lb 12 oz)   SpO2 100%   BMI 36.41 kg/m²   HEENT: non-icteric, no JVD  CV: S1, S2, irregular, without m/r/g; no LE edema  RESP: CTA B without w/r/r; breathing wnl  ABD: +bs, soft, nt, no hsm  SKIN: warm, no rashes  ACCESS: [facility] c/d/i    Data/  Recent Labs     01/02/24  1152 01/03/24  0728 01/05/24  0730   WBC 13.2* 13.4* 11.5*   HGB 15.3 15.2 13.5   HCT 47.0 48.4 42.0   MCV 96.4 98.8 96.7    178 154       Recent Labs     01/02/24  1152 01/03/24  0728 01/05/24  0730   * 130* 132*   K 5.4* 5.1 5.4*   CL 90* 87* 92*   CO2 25 20* 22   GLUCOSE 119* 109* 147*   PHOS  --  4.6 4.9   BUN 57* 70* 79*   CREATININE 7.8* 9.2* 8.4*   LABGLOM 7* 6* 7*         Assessment/     - End stage renal disease - on HD MWF     - Atrial fibrillation with RVR     - NICM wth EF ~20%, s/p ICD     - Hyperkalemia    - Staph aureus bacteremia - started on daptomycin      Plan/     - HD per MWF schedule, UF as tolerated,  kg  - Trend labs, bp's, weights    ____________________________________  Abdoul Dawson MD  [facility]  [email or web address]  Office: [phone]

## 2024-01-05 NOTE — PROGRESS NOTES
[facility] [place] Daily Progress Note      Admit Date:  1/2/2024    Subjective:  Mr. Horta is seen for follow up in ICU  Reason for Consultation/Chief Complaint: \"I have been asked to see him for afib RVR low BP\"   Today denies any complaints no chest pain no shortness of breath he is at RA  He is getting ready to get dialysis. No chest pain shortness of breath over night.  He is in afib at times RVR.    He refused his blood draw since labs could not obtain in one stick on 1.4.24      History of Present Illness:  Kanwal Horta is a 58 y.o. patient who presented to the hospital with complaints of  chest pain across anterior chest which started on 12.24.23.  Pain is like an ache with no radiation to neck or arms. It is worse with deep breath. Pain got better and went to sleep. Next morning 12.25.23 started having pain again which has continued since.  He has no fever cough but became SOB on 1.2.24  He is seen in ED with afib RVR.His BP runs borderline low and has been on midodrine taking 5mg TID  He has non ischemic cardiomyopathy with low EF.  He says he had seizures from amiodarone.  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 He has had Watchman left atrial appendage closure. He is taking Multaq, metoprolol and aspirin. He is taking a beta-blocker. He is unable to tolerate RAAS inhibition due to his CKD. Interrogation of his device shows normal function, 64% A-fib burden and no other significant arrhythmias.   Cardiac Testing    7/28/2023 ECG: atrial fibrillation, non specific IVCD (I have personally reviewed the ECG)   7/2022 Echo EF 35%, mild TR, moderate MR  12/2020 LINDSEY -EF 35-40, mild TR, moderate MR, small iatrogenic ASD,

## 2024-01-05 NOTE — PROGRESS NOTES
Pt resting in bed, denies pain, VSS, SpO2 98% on RA. Pt declined am lab draw and request labs be drawn with dialysis today.

## 2024-01-05 NOTE — PROGRESS NOTES
NP Parvin Gill notified of pt BC 1 of 2 + for staph aureus and pt allergies to vanco, bactrim, and amox. No orders per NP. Pharmacy notified of NP response.

## 2024-01-05 NOTE — PROGRESS NOTES
Initiated transfer with transfer center to [facility]. Electronically signed by Melany Haynes on 1/5/2024 at 12:59 PM

## 2024-01-05 NOTE — PLAN OF CARE
Pt resting in bed, A&O x4, VSS, SpO2 98% on RA, denies pain. Peripheral IV patent and NSL. LUE fistula dressing c/d/I. Reviewed with pt plan of care for shift and medications, all questions answered, pt voices no concerns.     Problem: Safety - Adult  Goal: Free from fall injury  1/4/2024 2206 by Julianna Edward, RN  Outcome: Progressing  1/4/2024 0832 by Naima Hoang RN  Outcome: Progressing     Problem: Discharge Planning  Goal: Discharge to home or other facility with appropriate resources  1/4/2024 2206 by Julianna Edward, RN  Outcome: Progressing  Flowsheets (Taken 1/4/2024 2000)  Discharge to home or other facility with appropriate resources:   Identify barriers to discharge with patient and caregiver   Arrange for needed discharge resources and transportation as appropriate  1/4/2024 0832 by Naima Hoang, RN  Outcome: Progressing     Problem: Pain  Goal: Verbalizes/displays adequate comfort level or baseline comfort level  1/4/2024 2206 by Julianna Edward, RN  Outcome: Progressing  Flowsheets (Taken 1/4/2024 2000)  Verbalizes/displays adequate comfort level or baseline comfort level:   Encourage patient to monitor pain and request assistance   Assess pain using appropriate pain scale   Administer analgesics based on type and severity of pain and evaluate response   Implement non-pharmacological measures as appropriate and evaluate response  1/4/2024 0832 by Naima Hoang, RN  Outcome: Progressing     Problem: Respiratory - Adult  Goal: Achieves optimal ventilation and oxygenation  1/4/2024 2206 by Julianna Edward, RN  Outcome: Progressing  Flowsheets (Taken 1/4/2024 2000)  Achieves optimal ventilation and oxygenation:   Assess for changes in respiratory status   Assess for changes in mentation and behavior   Position to facilitate oxygenation and minimize respiratory effort   Oxygen supplementation based on oxygen saturation or arterial blood gases   Assess

## 2024-01-05 NOTE — PROGRESS NOTES
Treatment time: 3 hrs    Net UF: 2000 ml    Pre weight: 115.1 kg  Post weight: 113.1 kg  EDW: 112 kg    Access used: Lt UE AVF  Access function: Well tolerated, 400 BFR    Medications or blood products given: Midodrine X 2 doses    Regular outpatient schedule: MWF    Summary of response to treatment: Pt tolerated fair. Pt was hypotensive throughout treatment. Pt did have chest pain with 30 min left of treatment. NS bolus given and UFG reduced. Dr. Dawson aware and pt came off 30 minutes early. Pt stated he has chest pain now with almost every dialysis treatment toward the end. Pt remained stable throughout entire treatment and upon this RN exiting hemodialysis suite.     Copy of dialysis treatment record placed in chart, to be scanned into EMR.

## 2024-01-05 NOTE — PROGRESS NOTES
IM Progress Note    Admit Date:  1/2/2024  3    Interval history:  admitted for chest pain but noted to have Afibb with RVR this am       1/3 Rapid RVR this am with HR upto 140 's and low BP in 60's systolic. Pt denies any dizziness or headache or new symptoms  Given digoxin by cardiology and improving    1/4- no acute issues, intermittent hypotension     1/5- 1/2 Blood cx with staph aureus     Subjective:    Mr. Horta seen up in bed, denies any issues  Remains on 2 l   BP stable  Denies any pain at fistula site    Had HD with recurrent chest pain and tolerated with intermittent low BP readings    Chest pain resolved but came back with HD temporarily        Hx of WPW with ablation and hx of Vtach s.p secondary prevention with AICD   F/w Dr. Bui      Objective:   BP (!) 89/55   Pulse (!) 123   Temp 97.2 °F (36.2 °C)   Resp 22   Ht 1.778 m (5' 10\")   Wt 113.1 kg (249 lb 5.4 oz)   SpO2 99%   BMI 35.78 kg/m²     Intake/Output Summary (Last 24 hours) at 1/5/2024 1517  Last data filed at 1/5/2024 0000  Gross per 24 hour   Intake 510 ml   Output --   Net 510 ml         Physical Exam:      General:  middle aged obese male, up in bed  Awake, alert and oriented. Appears to be not in any distress  Mucous Membranes:  Pink , anicteric  Neck: No JVD, no carotid bruit, no thyromegaly  Chest:  Clear to auscultation bilaterally, no added sounds  Left sided lateral pectoral AICD  Cardiovascular: irregular  S1S2 heard, no murmurs or gallops  Abdomen:  Soft, undistended, non tender, no organomegaly, BS present  Extremities: left UE AV fistula   Chronic lymphedema to both LE with no active wounds , chronic skin changes  Atrophy of both LE  Distal pulses well felt  Neurological : grossly normal  Pt non ambulatory , bed bound      Medications:   Scheduled Medications:    metoprolol succinate  12.5 mg Oral TID    DAPTOmycin (CUBICIN) 700 mg in sodium chloride 0.9 % 50 mL IVPB  8 mg/kg (Adjusted) IntraVENous Q48H    digoxin  125  mcg Oral Every Other Day    sevelamer  3,200 mg Oral TID WC    Virt-Caps  1 capsule Oral Daily    mupirocin   Each Nostril BID    sodium chloride flush  5-40 mL IntraVENous 2 times per day    aspirin  81 mg Oral Daily    [Held by provider] atorvastatin  40 mg Oral Nightly    heparin (porcine)  5,000 Units SubCUTAneous 3 times per day     I   sodium chloride       lidocaine, midodrine, perflutren lipid microspheres, morphine, sodium chloride flush, sodium chloride, ondansetron **OR** ondansetron, acetaminophen **OR** acetaminophen, polyethylene glycol, HYDROcodone-acetaminophen    Lab Data:  Recent Labs     01/03/24 0728 01/05/24 0730   WBC 13.4* 11.5*   HGB 15.2 13.5   HCT 48.4 42.0   MCV 98.8 96.7    154       Recent Labs     01/03/24 0728 01/05/24 0730   * 132*   K 5.1 5.4*   CL 87* 92*   CO2 20* 22   PHOS 4.6 4.9   BUN 70* 79*   CREATININE 9.2* 8.4*       Recent Labs     01/05/24  0730   CKTOTAL 33*       Coagulation:   Lab Results   Component Value Date/Time    INR 0.97 05/10/2016 11:25 AM    APTT 30.0 05/10/2016 11:25 AM     Cardiac markers:   Lab Results   Component Value Date/Time    CKTOTAL 33 01/05/2024 07:30 AM    TROPONINI <0.01 11/25/2015 05:50 PM         Lab Results   Component Value Date    ALT 11 01/02/2024    AST 26 01/02/2024    ALKPHOS 115 01/02/2024    BILITOT 1.2 (H) 01/02/2024       Lab Results   Component Value Date    INR 0.97 05/10/2016    INR 0.98 09/15/2015    INR 1.03 09/09/2015    PROTIME 11.0 05/10/2016    PROTIME 10.6 09/15/2015    PROTIME 11.1 09/09/2015       Radiology    XR CHEST PORTABLE   Final Result   No acute cardiopulmonary findings.  Stable cardiomegaly         CT CHEST WO CONTRAST   Final Result   1. No acute pulmonary infiltrate or pleural effusion.   2. Prominent cardiac enlargement.   3. Moderate to severe calcific atherosclerotic disease coronary arteries.   4. Main pulmonary artery dilatation can be seen with pulmonary hypertension   but is nonspecific.

## 2024-01-05 NOTE — PROGRESS NOTES
4 Eyes Skin Assessment     NAME:  Kanwal Horta  YOB: 1965  MEDICAL RECORD NUMBER:  [id number]    The patient is being assessed for  Shift Handoff    I agree that at least one RN has performed a thorough Head to Toe Skin Assessment on the patient. ALL assessment sites listed below have been assessed.      Areas assessed by both nurses:    Head, Face, Ears, Shoulders, Back, Chest, Arms, Elbows, Hands, Legs. Feet and Heels, Under Medical Devices , and Other pt declined turning for nurse to assess his sacrum/coccyx.        Does the Patient have a Wound? No noted wound(s)       Archie Prevention initiated by RN: Yes  Wound Care Orders initiated by RN: No    Pressure Injury (Stage 3,4, Unstageable, DTI, NWPT, and Complex wounds) if present, place Wound referral order by RN under : No    New Ostomies, if present place, Ostomy referral order under : No     Nurse 1 eSignature: Electronically signed by Julianna Edward RN on 1/4/24 at 10:06 PM EST    **SHARE this note so that the co-signing nurse can place an eSignature**    Nurse 2 eSignature: Electronically signed by Ciro Miller RN on 1/5/24 at 1:41 AM EST

## 2024-01-05 NOTE — PROGRESS NOTES
[facility] is closed for transfers at this time. Transfer center will leave this open and reeval tomorrow 1/6.

## 2024-01-05 NOTE — PROGRESS NOTES
Morning labs refused by patient yesterday and today- morning labs sent while patient is hooked up to HD.

## 2024-01-05 NOTE — PLAN OF CARE
Problem: Safety - Adult  Goal: Free from fall injury  1/5/2024 0845 by Gina Baptiste RN  Outcome: Progressing  1/4/2024 2206 by Julianna Edward RN  Outcome: Progressing     Problem: Discharge Planning  Goal: Discharge to home or other facility with appropriate resources  1/5/2024 0845 by Gina Baptiste RN  Outcome: Progressing  1/4/2024 2206 by Julianna Edward RN  Outcome: Progressing  Flowsheets (Taken 1/4/2024 2000)  Discharge to home or other facility with appropriate resources:   Identify barriers to discharge with patient and caregiver   Arrange for needed discharge resources and transportation as appropriate     Problem: Pain  Goal: Verbalizes/displays adequate comfort level or baseline comfort level  1/5/2024 0845 by Gina Baptiste RN  Outcome: Progressing  1/4/2024 2206 by Julianna Edward RN  Outcome: Progressing  Flowsheets (Taken 1/4/2024 2000)  Verbalizes/displays adequate comfort level or baseline comfort level:   Encourage patient to monitor pain and request assistance   Assess pain using appropriate pain scale   Administer analgesics based on type and severity of pain and evaluate response   Implement non-pharmacological measures as appropriate and evaluate response     Problem: Respiratory - Adult  Goal: Achieves optimal ventilation and oxygenation  1/5/2024 0845 by Gina Baptiste RN  Outcome: Progressing  1/4/2024 2206 by Julianna Edward RN  Outcome: Progressing  Flowsheets (Taken 1/4/2024 2000)  Achieves optimal ventilation and oxygenation:   Assess for changes in respiratory status   Assess for changes in mentation and behavior   Position to facilitate oxygenation and minimize respiratory effort   Oxygen supplementation based on oxygen saturation or arterial blood gases   Assess the need for suctioning and aspirate as needed     Problem: Cardiovascular - Adult  Goal: Maintains optimal cardiac output and hemodynamic stability  1/5/2024 0845 by  Baptiste, Gina, RN  Outcome: Progressing  1/4/2024 2206 by Julianna Edward RN  Outcome: Progressing  Flowsheets (Taken 1/4/2024 2000)  Maintains optimal cardiac output and hemodynamic stability:   Monitor blood pressure and heart rate   Monitor urine output and notify Licensed Independent Practitioner for values outside of normal range  Goal: Absence of cardiac dysrhythmias or at baseline  1/5/2024 0845 by Gina Baptiste RN  Outcome: Progressing  1/4/2024 2206 by Julianna Edwrad RN  Outcome: Progressing  Flowsheets (Taken 1/4/2024 2000)  Absence of cardiac dysrhythmias or at baseline:   Monitor cardiac rate and rhythm   Administer antiarrhythmia medication and electrolyte replacement as ordered     Problem: Metabolic/Fluid and Electrolytes - Adult  Goal: Hemodynamic stability and optimal renal function maintained  1/5/2024 0845 by Gina Baptiste RN  Outcome: Progressing  1/4/2024 2206 by Julianna Edward RN  Outcome: Progressing  Flowsheets (Taken 1/4/2024 2000)  Hemodynamic stability and optimal renal function maintained:   Monitor labs and assess for signs and symptoms of volume excess or deficit   Monitor intake, output and patient weight

## 2024-01-05 NOTE — PROGRESS NOTES
Shift assessment complete- see flowsheets.  Pt is A&Ox4.  VSS  Respirations are easy, even and unlabored.  Pt is receiving HD at this time.  PIV WDL. Flushed at this time.  Plan of care and goals reviewed. Call light within reach.  Bed in lowest position with wheels locked. No needs expressed at this time. Will continue to monitor.

## 2024-01-05 NOTE — PROGRESS NOTES
Reassessment complete. Pt is aware that we will start Daptomycin for his positive blood cultures.HD is complete and pt tolerating well

## 2024-01-05 NOTE — PROGRESS NOTES
Vanc added for patients + blood cultures. Pt has an allergy to vanc. Will address at rounds with physicians and pharmacist.

## 2024-01-06 VITALS
OXYGEN SATURATION: 98 % | BODY MASS INDEX: 37.46 KG/M2 | DIASTOLIC BLOOD PRESSURE: 68 MMHG | HEIGHT: 70 IN | TEMPERATURE: 97.5 F | SYSTOLIC BLOOD PRESSURE: 100 MMHG | RESPIRATION RATE: 20 BRPM | WEIGHT: 261.69 LBS | HEART RATE: 94 BPM

## 2024-01-06 LAB
ALBUMIN SERPL-MCNC: 3.3 G/DL (ref 3.4–5)
ANION GAP SERPL CALCULATED.3IONS-SCNC: 19 MMOL/L (ref 3–16)
BACTERIA BLD CULT ORG #2: ABNORMAL
BACTERIA BLD CULT ORG #2: ABNORMAL
BUN SERPL-MCNC: 52 MG/DL (ref 7–20)
CALCIUM SERPL-MCNC: 9.1 MG/DL (ref 8.3–10.6)
CHLORIDE SERPL-SCNC: 93 MMOL/L (ref 99–110)
CO2 SERPL-SCNC: 22 MMOL/L (ref 21–32)
CREAT SERPL-MCNC: 6.3 MG/DL (ref 0.9–1.3)
DEPRECATED RDW RBC AUTO: 17.3 % (ref 12.4–15.4)
DIGOXIN SERPL-MCNC: 1.2 NG/ML (ref 0.8–2)
EKG ATRIAL RATE: 153 BPM
EKG DIAGNOSIS: NORMAL
EKG Q-T INTERVAL: 332 MS
EKG QRS DURATION: 126 MS
EKG QTC CALCULATION (BAZETT): 476 MS
EKG R AXIS: -62 DEGREES
EKG T AXIS: 91 DEGREES
EKG VENTRICULAR RATE: 124 BPM
GFR SERPLBLD CREATININE-BSD FMLA CKD-EPI: 10 ML/MIN/{1.73_M2}
GLUCOSE SERPL-MCNC: 153 MG/DL (ref 70–99)
HCT VFR BLD AUTO: 42.8 % (ref 40.5–52.5)
HGB BLD-MCNC: 13.6 G/DL (ref 13.5–17.5)
MCH RBC QN AUTO: 31.4 PG (ref 26–34)
MCHC RBC AUTO-ENTMCNC: 31.8 G/DL (ref 31–36)
MCV RBC AUTO: 98.6 FL (ref 80–100)
ORGANISM: ABNORMAL
ORGANISM: ABNORMAL
PHOSPHATE SERPL-MCNC: 4.2 MG/DL (ref 2.5–4.9)
PLATELET # BLD AUTO: 123 K/UL (ref 135–450)
PMV BLD AUTO: 9.4 FL (ref 5–10.5)
POTASSIUM SERPL-SCNC: 5 MMOL/L (ref 3.5–5.1)
RBC # BLD AUTO: 4.34 M/UL (ref 4.2–5.9)
SODIUM SERPL-SCNC: 134 MMOL/L (ref 136–145)
WBC # BLD AUTO: 9.3 K/UL (ref 4–11)

## 2024-01-06 PROCEDURE — 6360000002 HC RX W HCPCS: Performed by: INTERNAL MEDICINE

## 2024-01-06 PROCEDURE — 2580000003 HC RX 258: Performed by: INTERNAL MEDICINE

## 2024-01-06 PROCEDURE — 80069 RENAL FUNCTION PANEL: CPT

## 2024-01-06 PROCEDURE — 99233 SBSQ HOSP IP/OBS HIGH 50: CPT | Performed by: INTERNAL MEDICINE

## 2024-01-06 PROCEDURE — 85027 COMPLETE CBC AUTOMATED: CPT

## 2024-01-06 PROCEDURE — 6370000000 HC RX 637 (ALT 250 FOR IP): Performed by: INTERNAL MEDICINE

## 2024-01-06 PROCEDURE — 99238 HOSP IP/OBS DSCHRG MGMT 30/<: CPT | Performed by: INTERNAL MEDICINE

## 2024-01-06 PROCEDURE — 80162 ASSAY OF DIGOXIN TOTAL: CPT

## 2024-01-06 PROCEDURE — 36415 COLL VENOUS BLD VENIPUNCTURE: CPT

## 2024-01-06 PROCEDURE — 93010 ELECTROCARDIOGRAM REPORT: CPT | Performed by: INTERNAL MEDICINE

## 2024-01-06 RX ORDER — DOBUTAMINE HYDROCHLORIDE 200 MG/100ML
2.5-1 INJECTION INTRAVENOUS CONTINUOUS
Status: DISCONTINUED | OUTPATIENT
Start: 2024-01-06 | End: 2024-01-06 | Stop reason: HOSPADM

## 2024-01-06 RX ORDER — MIDODRINE HYDROCHLORIDE 10 MG/1
10 TABLET ORAL ONCE
Status: COMPLETED | OUTPATIENT
Start: 2024-01-06 | End: 2024-01-06

## 2024-01-06 RX ORDER — DOBUTAMINE HYDROCHLORIDE 200 MG/100ML
2.5-1 INJECTION INTRAVENOUS CONTINUOUS
Status: DISCONTINUED | OUTPATIENT
Start: 2024-01-06 | End: 2024-01-06

## 2024-01-06 RX ADMIN — Medication 10 ML: at 09:24

## 2024-01-06 RX ADMIN — HEPARIN SODIUM 5000 UNITS: 5000 INJECTION INTRAVENOUS; SUBCUTANEOUS at 20:48

## 2024-01-06 RX ADMIN — METOPROLOL SUCCINATE 12.5 MG: 25 TABLET, EXTENDED RELEASE ORAL at 09:19

## 2024-01-06 RX ADMIN — HEPARIN SODIUM 5000 UNITS: 5000 INJECTION INTRAVENOUS; SUBCUTANEOUS at 05:42

## 2024-01-06 RX ADMIN — DIGOXIN 125 MCG: 125 TABLET ORAL at 09:18

## 2024-01-06 RX ADMIN — DOBUTAMINE HYDROCHLORIDE 2.5 MCG/KG/MIN: 200 INJECTION INTRAVENOUS at 03:20

## 2024-01-06 RX ADMIN — Medication 10 ML: at 20:49

## 2024-01-06 RX ADMIN — SEVELAMER CARBONATE 3200 MG: 800 TABLET, FILM COATED ORAL at 09:18

## 2024-01-06 RX ADMIN — HEPARIN SODIUM 5000 UNITS: 5000 INJECTION INTRAVENOUS; SUBCUTANEOUS at 17:36

## 2024-01-06 RX ADMIN — MIDODRINE HYDROCHLORIDE 10 MG: 10 TABLET ORAL at 00:34

## 2024-01-06 RX ADMIN — ASPIRIN 81 MG: 81 TABLET, CHEWABLE ORAL at 09:18

## 2024-01-06 RX ADMIN — ASCORBIC ACID, THIAMINE MONONITRATE,RIBOFLAVIN, NIACINAMIDE, PYRIDOXINE HYDROCHLORIDE, FOLIC ACID, CYANOCOBALAMIN, BIOTIN, CALCIUM PANTOTHENATE, 1 MG: 100; 1.5; 1.7; 20; 10; 1; 6000; 150000; 5 CAPSULE, LIQUID FILLED ORAL at 11:09

## 2024-01-06 RX ADMIN — MORPHINE SULFATE 2 MG: 2 INJECTION, SOLUTION INTRAMUSCULAR; INTRAVENOUS at 18:31

## 2024-01-06 ASSESSMENT — PAIN DESCRIPTION - FREQUENCY: FREQUENCY: CONTINUOUS

## 2024-01-06 ASSESSMENT — PAIN DESCRIPTION - ORIENTATION: ORIENTATION: MID

## 2024-01-06 ASSESSMENT — PAIN DESCRIPTION - PAIN TYPE: TYPE: ACUTE PAIN

## 2024-01-06 ASSESSMENT — PAIN SCALES - GENERAL
PAINLEVEL_OUTOF10: 6
PAINLEVEL_OUTOF10: 6
PAINLEVEL_OUTOF10: 4
PAINLEVEL_OUTOF10: 2
PAINLEVEL_OUTOF10: 6

## 2024-01-06 ASSESSMENT — PAIN DESCRIPTION - DIRECTION: RADIATING_TOWARDS: LEFT ARM

## 2024-01-06 ASSESSMENT — PAIN DESCRIPTION - LOCATION: LOCATION: CHEST

## 2024-01-06 ASSESSMENT — PAIN DESCRIPTION - DESCRIPTORS
DESCRIPTORS: ACHING
DESCRIPTORS: SHARP

## 2024-01-06 ASSESSMENT — PAIN - FUNCTIONAL ASSESSMENT: PAIN_FUNCTIONAL_ASSESSMENT: PREVENTS OR INTERFERES SOME ACTIVE ACTIVITIES AND ADLS

## 2024-01-06 NOTE — PROGRESS NOTES
Orders received from NP, spoke to NP at 2150.  Started Albumin,  ml bolus, and 10 mg extra midodrine given.  Paged Cardiology with no response as of 2310.  Called for Alfredo and Clinical to evaluate patient as well at 2200.      BP as charted in flow sheet.  Dr. Fuentes called to update and report no improvement in BP after interventions.  order received for Decadron for possible adrenal insufficiency as stated per Dr. Fuentes.   BP now 70/39.  Patient continues to complain of pain behind left eye and left shoulder

## 2024-01-06 NOTE — PROGRESS NOTES
Patient with 20 seconds of SVT. Order received to transfer patient to ICU, awaiting on bed.  Patient reports headache, chest pain, and left shoulder pain.  Patient with impending doom and worried that he is going to get shocked and won't make it through the night.  Reassured patient that we are transferring him to higher level acuity.

## 2024-01-06 NOTE — PROGRESS NOTES
Patient called out that he is feeling terrible, and feels his BP is dropping and his chest pain is continuing.  BP 80/43, NP notified.

## 2024-01-06 NOTE — PROGRESS NOTES
Patient diaphoretic, patient sweating profusely but does not want to move to change, small amounts of movements worsen chest pain.  Patient  feeling worse, very fearful of pain worsening.  Transferred patient via bed to ICU room 11.  Report given to DARLING Cedillo.  Assisted with transfer of care

## 2024-01-06 NOTE — PROGRESS NOTES
apical inferior and apical septal wall segments.   There is severe hypokinesis of the inferoseptal, anteroseptal and inferior   and anterior walls.   The anterolateral, inferolateral and apical lateral walls are mildly   hypokinetic.   Right ventricular systolic function is reduced .Right atrial pressure is   elevated estimated at 15mm of Hg.Right ventricular systolic function is   impaired.   3- 35-4%. LVE, RVE, FRANCK, LAE, severe rv hypertrophy, flattened   septum, RV pressure/volume overload, RV mod to sev reduced. mild MR< mod to   sev TR, SPAP 45-55 mmHg., DD3      7/28/2023 ECG: atrial fibrillation, non specific IVCD (I have personally reviewed the ECG)   7/2022 Echo EF 35%, mild TR, moderate MR  12/2020 [facility] -EF 35-40, mild TR, moderate MR, small iatrogenic ASD, complete left atrial appendage closure with no leak or thrombus  10/2020 Watchman JAYNE closure  8/2020 [facility] - luminal irregularities  8/2020 ECG - atrial fibrillation with rapid ventricular response, LBBB  7/2018 Echo - EF 55, grade 2 diastolic dysfunction  3/2018 SICD  10/2017 EP study - no inducible SVT, no inducible VT, no AP  7/2017 ECG - SVT with aberrancy,   9/2016 Lexiscan nuclear stress - normal EF, normal perfusion  5/2016 EP study and ablation - right anterior AP ablation, LBBB aberrancy  9/2015 Atypical AVNRT ablation  11/25/15 ECG - wide complex tachycardia, QRS negative throughout precordial leads (I have personally reviewed the ECG)        Impression and Plan      Paroxysmal atrial fibrillation, with rapid ventricular response  -status post watchman  -Maintained on Multaq prior to admission. This has been held.   - Placed on digoxin with 500 mcg load 1/3 and 0.125 mg every other day prescribed per Dr. Prado this admission. Dig level 1.2. Acceptable.   -holding further metoprolol given low BP overnight. Assess BP curve today (did receive AM dose). If stable BP into PM, plan to wean dobutamine.      Sepsis  Hypotension -  the need for patient directed risk factor modification.  All questions and concerns were addressed to the patient/family. Alternatives to my treatment were discussed.     Thank you for allowing us to participate in the care of Kanwal Horta. Please call me with any questions [phone].    Kar Cruz MD, [facility]  Cardiovascular Disease  [facility]  [phone] [place] Office  [phone] [place] Office  1/6/2024 8:13 AM

## 2024-01-06 NOTE — PROGRESS NOTES
CM-A. Fib, VSS on Dobutamine gtt infusing @ 2.5 mcg/kg/min. To keep MAP > 65.  Will continue to monitor.

## 2024-01-06 NOTE — FLOWSHEET NOTE
01/05/24 1532   Vital Signs   Temp 96.9 °F (36.1 °C)   Temp Source Axillary   Pulse 84   Heart Rate Source Monitor   Respirations 21   BP (!) 90/52   MAP (Calculated) 65   BP Location Right lower arm   BP Method Automatic   Patient Position Semi fowlers   Oxygen Therapy   SpO2 98 %   O2 Device None (Room air)     Patient arrived to room 313 from ICU. Vitals are stable. Patient is A&O x4. Patient denies further needs. Call light within reach.

## 2024-01-06 NOTE — PROGRESS NOTES
Dr. Burrell notified that patients BP remianed 75/53.  Ordered medidrine 10 mg, PO.  Given to patient now.

## 2024-01-06 NOTE — PROGRESS NOTES
IM Progress Note    Admit Date:  1/2/2024  4    Interval history:  admitted for chest pain but noted to have Afibb with RVR this am       1/3 Rapid RVR this am with HR upto 140 's and low BP in 60's systolic. Pt denies any dizziness or headache or new symptoms  Given digoxin by cardiology and improving    1/4- no acute issues, intermittent hypotension     1/5- 1/2 Blood cx with staph aureus started daptomycin, BP remained stable    1/6-   Issues with Afibb overnight needing transfer back to ICU   Started dobutamine for low BP issues      Subjective:    Mr. Horta seen resting in bed in no distress   Remains on 2 l   BP stable on dobutamine       Chest pain resolved but came back with HD temporarily        Hx of WPW with ablation and hx of Vtach s.p secondary prevention with AICD   F/w Dr. Bui      Objective:   BP (!) 89/67   Pulse 99   Temp 97.4 °F (36.3 °C) (Oral)   Resp 12   Ht 1.778 m (5' 10\")   Wt 118.7 kg (261 lb 11 oz)   SpO2 97%   BMI 37.55 kg/m²     Intake/Output Summary (Last 24 hours) at 1/6/2024 0810  Last data filed at 1/5/2024 1532  Gross per 24 hour   Intake 180 ml   Output --   Net 180 ml         Physical Exam:      General:  middle aged obese male, up in bed sleeping  Deferred today  Pt non ambulatory , bed bound      Medications:   Scheduled Medications:    metoprolol succinate  12.5 mg Oral TID    DAPTOmycin (CUBICIN) 700 mg in sodium chloride 0.9 % 50 mL IVPB  8 mg/kg (Adjusted) IntraVENous Q48H    digoxin  125 mcg Oral Every Other Day    sevelamer  3,200 mg Oral TID WC    Virt-Caps  1 capsule Oral Daily    mupirocin   Each Nostril BID    sodium chloride flush  5-40 mL IntraVENous 2 times per day    aspirin  81 mg Oral Daily    [Held by provider] atorvastatin  40 mg Oral Nightly    heparin (porcine)  5,000 Units SubCUTAneous 3 times per day     I   DOBUTamine 2.5 mcg/kg/min (01/06/24 0320)    sodium chloride       lidocaine, midodrine, perflutren lipid microspheres, morphine, sodium  aldactone or jardiance with ESRD and hypotension    ESRD on HD- mild elevated k at 5.4, resolved on repeat  Nephrology consulted and had HD with midodrine    Chronic hypotension - on midodrine 10 mg tid , resumed    Obesity noted - weight loss and life style modification along with dietary changes, increased physical activity encouraged    Heparin   Resumed diet    Full code  Await bed transfer to [facility] for ID and cardiology consultations        Lj Carreon MD, 1/6/2024 8:10 AM

## 2024-01-06 NOTE — PROGRESS NOTES
Bedside report and transfer of care given to DARLING Perry. Pt currently resting in bed with the call light within reach. Pt denies any other care needs at this time. Pt stable at this time.

## 2024-01-06 NOTE — PROGRESS NOTES
Patient's heart rate is now 140-160s.  BP is 61/44.  Patient is alert and oriented and reports that he still is having pain, but it is better. Patient is fearful of getting shocked by his defibrillator.  Dr. Fuentes notified of patient's increased heartrate and new BP.  Also called Clinical, as looking into notes that patient was ordered to be in ICU per Pulmonology today, no other orders viewed and getting update on patient's transfer status

## 2024-01-06 NOTE — PROGRESS NOTES
[facility] Progress Note           Subjective/   58 y.o. year old male who we are seeing in consultation for ESKD on HD.     HPI:  Last HD on 1/5 with 2 liters removed, post-weight of 113.1 kg.  Transferred back to ICU o/n due to tachycardia, hypotension, better with dobutamine drip.    ROS:  Intake adequate, +weak.    Objective/   GEN:  Chronically ill, /72   Pulse 92   Temp 97.4 °F (36.3 °C) (Oral)   Resp 13   Ht 1.778 m (5' 10\")   Wt 118.7 kg (261 lb 11 oz)   SpO2 97%   BMI 37.55 kg/m²   HEENT: non-icteric, no JVD  CV: S1, S2, irregular, without m/r/g; no LE edema  RESP: CTA B without w/r/r; breathing wnl  ABD: +bs, soft, nt, no hsm  SKIN: warm, no rashes  ACCESS: LUE AVF c/d/i    Data/  Recent Labs     01/03/24  0728 01/05/24  0730   WBC 13.4* 11.5*   HGB 15.2 13.5   HCT 48.4 42.0   MCV 98.8 96.7    154       Recent Labs     01/03/24  0728 01/05/24  0730   * 132*   K 5.1 5.4*   CL 87* 92*   CO2 20* 22   GLUCOSE 109* 147*   PHOS 4.6 4.9   BUN 70* 79*   CREATININE 9.2* 8.4*   LABGLOM 6* 7*         Assessment/     - End stage renal disease - on HD MWF     - Atrial fibrillation with RVR     - NICM wth EF ~20%, s/p ICD     - Hyperkalemia    - Staph aureus bacteremia - started on daptomycin      Plan/     - HD per MWF schedule, UF as tolerated,  kg  - Trend labs, bp's, weights    ____________________________________  Abdoul Dawson MD  [facility]  [email or web address]  Office: [phone]

## 2024-01-06 NOTE — PROGRESS NOTES
4 Eyes Skin Assessment     NAME:  Kanwal Horta  YOB: 1965  MEDICAL RECORD NUMBER:  [id number]    The patient is being assessed for  Transfer to New Unit    I agree that at least one RN has performed a thorough Head to Toe Skin Assessment on the patient. ALL assessment sites listed below have been assessed.      Areas assessed by both nurses:    Head, Face, Ears, Shoulders, Back, Chest, Arms, Elbows, Hands, Sacrum. Buttock, Coccyx, Ischium, Legs. Feet and Heels, and Under Medical Devices         Does the Patient have a Wound? No noted wound(s)       Archie Prevention initiated by RN: No  Wound Care Orders initiated by RN: No    Pressure Injury (Stage 3,4, Unstageable, DTI, NWPT, and Complex wounds) if present, place Wound referral order by RN under : No    New Ostomies, if present place, Ostomy referral order under : No     Nurse 1 eSignature: Electronically signed by Nguyen Whitehead RN on 1/6/24 at 3:49 AM EST    **SHARE this note so that the co-signing nurse can place an eSignature**    Nurse 2 eSignature: {Esignature:[id number]}

## 2024-01-06 NOTE — PROGRESS NOTES
Pt transferred from PCU to ICU room 11 via bed. Bedside report taken from Vicky<RN.     Pt connected to ICU monitoring and HR was initially in the 180's, BP systolic in 70s.     Blood pressure (!) 80/69, pulse (!) 167, temperature 97.4 °F (36.3 °C), temperature source Oral, resp. rate 20, height 1.778 m (5' 10\"), weight 113.1 kg (249 lb 5.4 oz), SpO2 96 %.    Head to toe and skin assessment completed.     IV continuous Dobutamine started at 2.5 mcg/kg/min.     Pt c/o continuous chest pain 3/10 and states when he breathes deep his pain in 6/10, and states that he feels pain in his head and behind left eye was previously hurting but now it is not. Pt states that no one is able to draw labs on him when asked about getting his Dig level. Pt states that his veins are way too small and they roll and no one can get him.     Pt sitting high fowlers in bed, states that he is allowed to eat and drink what he wants. Pt eating ice currently.     Bed in lowest position, brakes locked and alarm on. Bedside table and call light within reach.     Monitoring closely.     Electronically signed by Nguyen Whitehead RN on 1/6/2024 at 3:37 AM

## 2024-01-06 NOTE — PROGRESS NOTES
Pt remains on 2.5 mcg/kg/min of Dobutamine. HR staying between 100's and  120s, occasionally 130s at the highest. Still AFIB RVR. BP's improved, MAPS above 65. Pt has not c/o CP since starting the Dobutamine gtt.     Pt resting, eyes closed.     Closely monitoring pt.     Electronically signed by Nguyen Whitehead RN on 1/6/2024 at 6:03 AM

## 2024-01-06 NOTE — PROGRESS NOTES
[facility]                                 Pulmonary/Critical care  Consult /Progress Note :                                                                  CC :atypical chest pain   Patient is being seen at the request of Dr NOLASCO for a consultation for ICU management     Subjective  Doing much better   On RA   No further ICD shock   BP is ok on range 110 and MAP 70  Grow MRSA ,on daptomyicn   Declined vancomycin ,had dov syndrome    PHYSICAL EXAM:  Blood pressure (!) 109/98, pulse 94, temperature 97.4 °F (36.3 °C), temperature source Oral, resp. rate 11, height 1.778 m (5' 10\"), weight 118.7 kg (261 lb 11 oz), SpO2 98 %.' on RA  Gen: No distress.   Eyes: PERRL. No sclera icterus. No conjunctival injection.   ENT: No discharge. Pharynx clear.   Neck: Trachea midline. No obvious mass.    Resp: rhonchi bilateral   GI: Non-tender. Non-distended. No hernia.   Skin: Warm and dry. No nodule on exposed extremities.   Lymph: No cervical LAD. No supraclavicular LAD.   M/S: No cyanosis. No joint deformity. No clubbing.   Neuro: Awake. Alert. Moves all four extremities.   Psych: Oriented x 3. No anxiety.     LABS:  CBC:   Recent Labs     01/05/24  0730 01/06/24  0537   WBC 11.5* 9.3   HGB 13.5 13.6   HCT 42.0 42.8   MCV 96.7 98.6    123*       BMP:   Recent Labs     01/05/24  0730 01/06/24  0537   * 134*   K 5.4* 5.0   CL 92* 93*   CO2 22 22   PHOS 4.9 4.2   BUN 79* 52*   CREATININE 8.4* 6.3*       LIVER PROFILE:   No results for input(s): \"AST\", \"ALT\", \"LIPASE\", \"AMYLASE\", \"ALB\", \"BILIDIR\", \"BILITOT\", \"ALKPHOS\" in the last 72 hours.    PT/INR: No results for input(s): \"PROTIME\", \"INR\" in the last 72 hours.  APTT: No results for input(s): \"APTT\" in the last 72 hours.  UA:No results for input(s): \"NITRITE\", \"COLORU\", \"PHUR\", \"LABCAST\", \"WBCUA\", \"RBCUA\", \"MUCUS\", \"TRICHOMONAS\", \"YEAST\", \"BACTERIA\", \"CLARITYU\", \"SPECGRAV\",  \"LEUKOCYTESUR\", \"UROBILINOGEN\", \"BILIRUBINUR\", \"BLOODU\", \"GLUCOSEU\", \"AMORPHOUS\" in the last 72 hours.    Invalid input(s): \"KETONESU\"  No results for input(s): \"PHART\", \"DWA8ALL\", \"PO2ART\" in the last 72 hours.    Microbiology:  Pending       Imaging:  Chest imaging was reviewed by me and showed no acute process    ASSESSMENT:   A fib   Vtach  History WPW s/p ablation   NICM  ESRD on HD  S/P ICD   Possible adrenal insuffiencey  Possible OWEN,need sleep study as OP   MRSA bactermia source ? Has fistula and has ICD    PLAN:  Cardiology follow ,plan tertiary center for EP evaluation ,discuss with IM/card    Pending bed in [facility]   HD per renal   Midodrine prn with HD   With his MRSA bacteremia ,I discussed with [facility] that patient need to be transferred where followed by ID and EP,pending bed ,also in daptomycin ,has ICD and fistula ? Source unclear   Keep sat above 95  Watch BP as has low BP this am before transfer and given steroids,pending steroids level   S/p Watchman Procedure  May need sleep study as OP  DVT px heparin   Need EP evaluation when bed available   Need transfer for EP and ID evaluation   On dig  and lopressor(hold it )  as per cardiology  Keep in ICU

## 2024-01-07 LAB — BACTERIA BLD CULT: NORMAL

## 2024-01-07 NOTE — PLAN OF CARE
Patient discharged and transported to [facility] ICU    Problem: Safety - Adult  Goal: Free from fall injury  Outcome: Completed     Problem: Discharge Planning  Goal: Discharge to home or other facility with appropriate resources  Outcome: Completed     Problem: Pain  Goal: Verbalizes/displays adequate comfort level or baseline comfort level  Outcome: Completed     Problem: Respiratory - Adult  Goal: Achieves optimal ventilation and oxygenation  Outcome: Completed     Problem: Cardiovascular - Adult  Goal: Maintains optimal cardiac output and hemodynamic stability  Outcome: Completed  Goal: Absence of cardiac dysrhythmias or at baseline  Outcome: Completed     Problem: Metabolic/Fluid and Electrolytes - Adult  Goal: Hemodynamic stability and optimal renal function maintained  Outcome: Completed     Problem: Skin/Tissue Integrity  Goal: Absence of new skin breakdown  Description: 1.  Monitor for areas of redness and/or skin breakdown  2.  Assess vascular access sites hourly  3.  Every 4-6 hours minimum:  Change oxygen saturation probe site  4.  Every 4-6 hours:  If on nasal continuous positive airway pressure, respiratory therapy assess nares and determine need for appliance change or resting period.  Outcome: Completed

## 2024-01-07 NOTE — PROGRESS NOTES
Patient discharged, picked up per [facility], all belongings sent with patient, one IV in place, dobutamine drip infusing at 2.5mcg/kg/min continuous rate.  Report to be called, AVS sent.

## 2024-01-07 NOTE — PROGRESS NOTES
Patient has bed set up for [facility] room 1335.  Reporting number [phone]  Transport set up for [facility] 9pm.

## 2024-01-15 NOTE — PROGRESS NOTES
Physician Progress Note      PATIENT:               MARYAM COURTNEY  [facility] #:                  [id number]  :                       1965  ADMIT DATE:       2024 11:37 AM  DISCH DATE:        2024 9:40 PM  RESPONDING  PROVIDER #:        Lj Carreon MD          QUERY TEXT:    Patient was admitted with atrial fibrillation and atypical chest pain. Patient   was noted to have troponin 141, 118, 130, 152, 152. EKG in ER revealed A-fib   with RVR, left axis deviation, appears to be left bundle branch block,   consistent with previous morphology although more apparent. No ST segment   elevation, segment depression, hyperacute T waves. T wave abnormality in the   lateral leads. Ventricular 113. Abnormal EKG from prior, but last was . No   peaked T waves. Patient was treated with IV  Heparin, IV Digoxin, metoprolol, and IVF. Amiodarone and diltiazem   contraindicated. After study, can the patient's cardiac condition be specified   as:      The medical record reflects the following:  Risk Factors:  atrial fibrillation and atypical chest pain  Clinical Indicators:  troponin 141, 118, 130, 152, 152.    EKG in ER revealed A-fib with RVR, left axis deviation, appears to be left   bundle branch block, consistent with previous morphology although more   apparent. No ST segment elevation, segment depression, hyperacute T waves. T   wave abnormality in the lateral leads. Ventricular 113.    Treatment:  treated with IV Heparin, IV Digoxin, metoprolol, and IVF   Amiodarone. diltiazem contraindicated    Thank You Paula Aragon RN, CDS [email or web address]  Options provided:  -- Type 2 MI due to atrial fibrillation  -- Demand Ischemia due to atrial fibrillation  -- Other - I will add my own diagnosis  -- Disagree - Not applicable / Not valid  -- Disagree - Clinically unable to determine / Unknown  -- Refer to Clinical Documentation Reviewer    PROVIDER RESPONSE TEXT:    This patient has demand ischemia due to atrial

## 2024-02-02 PROBLEM — R79.89 ELEVATED TROPONIN: Status: RESOLVED | Noted: 2024-01-03 | Resolved: 2024-02-02

## 2024-02-19 ENCOUNTER — APPOINTMENT (OUTPATIENT)
Dept: CT IMAGING | Age: 59
DRG: 314 | End: 2024-02-19
Payer: MEDICARE

## 2024-02-19 ENCOUNTER — APPOINTMENT (OUTPATIENT)
Dept: GENERAL RADIOLOGY | Age: 59
DRG: 314 | End: 2024-02-19
Payer: MEDICARE

## 2024-02-19 ENCOUNTER — HOSPITAL ENCOUNTER (INPATIENT)
Age: 59
LOS: 7 days | Discharge: HOME | DRG: 314 | End: 2024-02-26
Attending: STUDENT IN AN ORGANIZED HEALTH CARE EDUCATION/TRAINING PROGRAM | Admitting: INTERNAL MEDICINE
Payer: MEDICARE

## 2024-02-19 DIAGNOSIS — E04.1 THYROID NODULE: ICD-10-CM

## 2024-02-19 DIAGNOSIS — J18.9 PNEUMONIA DUE TO INFECTIOUS ORGANISM, UNSPECIFIED LATERALITY, UNSPECIFIED PART OF LUNG: ICD-10-CM

## 2024-02-19 DIAGNOSIS — I47.19 AVNRT (AV NODAL RE-ENTRY TACHYCARDIA): ICD-10-CM

## 2024-02-19 DIAGNOSIS — N18.6 STAGE 5 CHRONIC KIDNEY DISEASE ON CHRONIC DIALYSIS (HCC): ICD-10-CM

## 2024-02-19 DIAGNOSIS — E87.6 HYPOKALEMIA: ICD-10-CM

## 2024-02-19 DIAGNOSIS — R29.898 WEAKNESS OF BOTH UPPER EXTREMITIES: ICD-10-CM

## 2024-02-19 DIAGNOSIS — Z99.2 STAGE 5 CHRONIC KIDNEY DISEASE ON CHRONIC DIALYSIS (HCC): ICD-10-CM

## 2024-02-19 DIAGNOSIS — I95.9 HYPOTENSION, UNSPECIFIED HYPOTENSION TYPE: Primary | ICD-10-CM

## 2024-02-19 LAB
ALBUMIN SERPL-MCNC: 3 G/DL (ref 3.4–5)
ALBUMIN/GLOB SERPL: 0.8 {RATIO} (ref 1.1–2.2)
ALP SERPL-CCNC: 57 U/L (ref 40–129)
ALT SERPL-CCNC: <5 U/L (ref 10–40)
ANION GAP SERPL CALCULATED.3IONS-SCNC: 14 MMOL/L (ref 3–16)
AST SERPL-CCNC: 20 U/L (ref 15–37)
BASOPHILS # BLD: 0.1 K/UL (ref 0–0.2)
BASOPHILS NFR BLD: 1.4 %
BILIRUB SERPL-MCNC: 0.6 MG/DL (ref 0–1)
BUN SERPL-MCNC: 23 MG/DL (ref 7–20)
CALCIUM SERPL-MCNC: 8.4 MG/DL (ref 8.3–10.6)
CHLORIDE SERPL-SCNC: 94 MMOL/L (ref 99–110)
CO2 SERPL-SCNC: 28 MMOL/L (ref 21–32)
CREAT SERPL-MCNC: 3.2 MG/DL (ref 0.9–1.3)
DEPRECATED RDW RBC AUTO: 19.4 % (ref 12.4–15.4)
EKG ATRIAL RATE: 56 BPM
EKG ATRIAL RATE: 57 BPM
EKG DIAGNOSIS: NORMAL
EKG DIAGNOSIS: NORMAL
EKG Q-T INTERVAL: 468 MS
EKG Q-T INTERVAL: 544 MS
EKG QRS DURATION: 128 MS
EKG QRS DURATION: 152 MS
EKG QTC CALCULATION (BAZETT): 486 MS
EKG QTC CALCULATION (BAZETT): 534 MS
EKG R AXIS: -38 DEGREES
EKG R AXIS: -39 DEGREES
EKG T AXIS: 107 DEGREES
EKG T AXIS: 122 DEGREES
EKG VENTRICULAR RATE: 58 BPM
EKG VENTRICULAR RATE: 65 BPM
EOSINOPHIL # BLD: 0.1 K/UL (ref 0–0.6)
EOSINOPHIL NFR BLD: 1.5 %
GFR SERPLBLD CREATININE-BSD FMLA CKD-EPI: 21 ML/MIN/{1.73_M2}
GLUCOSE SERPL-MCNC: 86 MG/DL (ref 70–99)
HCT VFR BLD AUTO: 29.5 % (ref 40.5–52.5)
HGB BLD-MCNC: 9.4 G/DL (ref 13.5–17.5)
LACTATE BLDV-SCNC: 2.4 MMOL/L (ref 0.4–1.9)
LACTATE BLDV-SCNC: 2.8 MMOL/L (ref 0.4–1.9)
LYMPHOCYTES # BLD: 1.1 K/UL (ref 1–5.1)
LYMPHOCYTES NFR BLD: 16.1 %
MAGNESIUM SERPL-MCNC: 1.8 MG/DL (ref 1.8–2.4)
MCH RBC QN AUTO: 31.3 PG (ref 26–34)
MCHC RBC AUTO-ENTMCNC: 31.9 G/DL (ref 31–36)
MCV RBC AUTO: 97.9 FL (ref 80–100)
MONOCYTES # BLD: 0.5 K/UL (ref 0–1.3)
MONOCYTES NFR BLD: 7.7 %
NEUTROPHILS # BLD: 4.9 K/UL (ref 1.7–7.7)
NEUTROPHILS NFR BLD: 73.3 %
PLATELET # BLD AUTO: 188 K/UL (ref 135–450)
PMV BLD AUTO: 7.5 FL (ref 5–10.5)
POTASSIUM SERPL-SCNC: 3 MMOL/L (ref 3.5–5.1)
PROT SERPL-MCNC: 7 G/DL (ref 6.4–8.2)
RBC # BLD AUTO: 3.01 M/UL (ref 4.2–5.9)
SARS-COV-2 RDRP RESP QL NAA+PROBE: NOT DETECTED
SODIUM SERPL-SCNC: 136 MMOL/L (ref 136–145)
WBC # BLD AUTO: 6.7 K/UL (ref 4–11)

## 2024-02-19 PROCEDURE — 71250 CT THORAX DX C-: CPT

## 2024-02-19 PROCEDURE — 93010 ELECTROCARDIOGRAM REPORT: CPT | Performed by: INTERNAL MEDICINE

## 2024-02-19 PROCEDURE — 83605 ASSAY OF LACTIC ACID: CPT

## 2024-02-19 PROCEDURE — 87040 BLOOD CULTURE FOR BACTERIA: CPT

## 2024-02-19 PROCEDURE — 2000000000 HC ICU R&B

## 2024-02-19 PROCEDURE — 87635 SARS-COV-2 COVID-19 AMP PRB: CPT

## 2024-02-19 PROCEDURE — 71045 X-RAY EXAM CHEST 1 VIEW: CPT

## 2024-02-19 PROCEDURE — 96365 THER/PROPH/DIAG IV INF INIT: CPT

## 2024-02-19 PROCEDURE — 2580000003 HC RX 258: Performed by: NURSE PRACTITIONER

## 2024-02-19 PROCEDURE — 99285 EMERGENCY DEPT VISIT HI MDM: CPT

## 2024-02-19 PROCEDURE — 02HV33Z INSERTION OF INFUSION DEVICE INTO SUPERIOR VENA CAVA, PERCUTANEOUS APPROACH: ICD-10-PCS | Performed by: INTERNAL MEDICINE

## 2024-02-19 PROCEDURE — 6360000004 HC RX CONTRAST MEDICATION: Performed by: NURSE PRACTITIONER

## 2024-02-19 PROCEDURE — 72125 CT NECK SPINE W/O DYE: CPT

## 2024-02-19 PROCEDURE — 85025 COMPLETE CBC W/AUTO DIFF WBC: CPT

## 2024-02-19 PROCEDURE — 73030 X-RAY EXAM OF SHOULDER: CPT

## 2024-02-19 PROCEDURE — 2500000003 HC RX 250 WO HCPCS: Performed by: STUDENT IN AN ORGANIZED HEALTH CARE EDUCATION/TRAINING PROGRAM

## 2024-02-19 PROCEDURE — 6370000000 HC RX 637 (ALT 250 FOR IP): Performed by: NURSE PRACTITIONER

## 2024-02-19 PROCEDURE — 2580000003 HC RX 258: Performed by: STUDENT IN AN ORGANIZED HEALTH CARE EDUCATION/TRAINING PROGRAM

## 2024-02-19 PROCEDURE — 36556 INSERT NON-TUNNEL CV CATH: CPT

## 2024-02-19 PROCEDURE — 93005 ELECTROCARDIOGRAM TRACING: CPT | Performed by: NURSE PRACTITIONER

## 2024-02-19 PROCEDURE — 82533 TOTAL CORTISOL: CPT

## 2024-02-19 PROCEDURE — 36415 COLL VENOUS BLD VENIPUNCTURE: CPT

## 2024-02-19 PROCEDURE — 3E033XZ INTRODUCTION OF VASOPRESSOR INTO PERIPHERAL VEIN, PERCUTANEOUS APPROACH: ICD-10-PCS | Performed by: INTERNAL MEDICINE

## 2024-02-19 PROCEDURE — 80053 COMPREHEN METABOLIC PANEL: CPT

## 2024-02-19 PROCEDURE — 70450 CT HEAD/BRAIN W/O DYE: CPT

## 2024-02-19 PROCEDURE — 96367 TX/PROPH/DG ADDL SEQ IV INF: CPT

## 2024-02-19 PROCEDURE — 96361 HYDRATE IV INFUSION ADD-ON: CPT

## 2024-02-19 PROCEDURE — 93005 ELECTROCARDIOGRAM TRACING: CPT | Performed by: STUDENT IN AN ORGANIZED HEALTH CARE EDUCATION/TRAINING PROGRAM

## 2024-02-19 PROCEDURE — 6360000002 HC RX W HCPCS: Performed by: NURSE PRACTITIONER

## 2024-02-19 PROCEDURE — 70498 CT ANGIOGRAPHY NECK: CPT

## 2024-02-19 PROCEDURE — 83735 ASSAY OF MAGNESIUM: CPT

## 2024-02-19 RX ORDER — TRAZODONE HYDROCHLORIDE 50 MG/1
25 TABLET ORAL NIGHTLY
COMMUNITY

## 2024-02-19 RX ORDER — PANTOPRAZOLE SODIUM 40 MG/1
40 TABLET, DELAYED RELEASE ORAL
COMMUNITY

## 2024-02-19 RX ORDER — ACETAMINOPHEN 650 MG/1
650 SUPPOSITORY RECTAL EVERY 6 HOURS PRN
Status: DISCONTINUED | OUTPATIENT
Start: 2024-02-19 | End: 2024-02-26 | Stop reason: HOSPADM

## 2024-02-19 RX ORDER — GUAIFENESIN 600 MG/1
1200 TABLET, EXTENDED RELEASE ORAL 2 TIMES DAILY
COMMUNITY

## 2024-02-19 RX ORDER — ACETAMINOPHEN 500 MG
1000 TABLET ORAL EVERY 6 HOURS PRN
COMMUNITY

## 2024-02-19 RX ORDER — ACETAMINOPHEN 325 MG/1
650 TABLET ORAL EVERY 6 HOURS PRN
Status: DISCONTINUED | OUTPATIENT
Start: 2024-02-19 | End: 2024-02-26 | Stop reason: HOSPADM

## 2024-02-19 RX ORDER — ONDANSETRON 2 MG/ML
4 INJECTION INTRAMUSCULAR; INTRAVENOUS EVERY 6 HOURS PRN
Status: DISCONTINUED | OUTPATIENT
Start: 2024-02-19 | End: 2024-02-20

## 2024-02-19 RX ORDER — POLYETHYLENE GLYCOL 3350 17 G/17G
17 POWDER, FOR SOLUTION ORAL DAILY PRN
Status: DISCONTINUED | OUTPATIENT
Start: 2024-02-19 | End: 2024-02-26 | Stop reason: HOSPADM

## 2024-02-19 RX ORDER — 0.9 % SODIUM CHLORIDE 0.9 %
500 INTRAVENOUS SOLUTION INTRAVENOUS ONCE
Status: COMPLETED | OUTPATIENT
Start: 2024-02-19 | End: 2024-02-19

## 2024-02-19 RX ORDER — POTASSIUM CHLORIDE 7.45 MG/ML
10 INJECTION INTRAVENOUS PRN
Status: DISCONTINUED | OUTPATIENT
Start: 2024-02-19 | End: 2024-02-20

## 2024-02-19 RX ORDER — SODIUM CHLORIDE 0.9 % (FLUSH) 0.9 %
5-40 SYRINGE (ML) INJECTION PRN
Status: DISCONTINUED | OUTPATIENT
Start: 2024-02-19 | End: 2024-02-26 | Stop reason: HOSPADM

## 2024-02-19 RX ORDER — LINEZOLID 2 MG/ML
600 INJECTION, SOLUTION INTRAVENOUS ONCE
Status: COMPLETED | OUTPATIENT
Start: 2024-02-19 | End: 2024-02-19

## 2024-02-19 RX ORDER — POTASSIUM CHLORIDE 29.8 MG/ML
20 INJECTION INTRAVENOUS PRN
Status: DISCONTINUED | OUTPATIENT
Start: 2024-02-19 | End: 2024-02-20

## 2024-02-19 RX ORDER — SODIUM CHLORIDE 0.9 % (FLUSH) 0.9 %
5-40 SYRINGE (ML) INJECTION EVERY 12 HOURS SCHEDULED
Status: DISCONTINUED | OUTPATIENT
Start: 2024-02-20 | End: 2024-02-26 | Stop reason: HOSPADM

## 2024-02-19 RX ORDER — SENNOSIDES A AND B 8.6 MG/1
2 TABLET, FILM COATED ORAL DAILY PRN
COMMUNITY

## 2024-02-19 RX ORDER — MIDODRINE HYDROCHLORIDE 10 MG/1
10 TABLET ORAL
Status: DISCONTINUED | OUTPATIENT
Start: 2024-02-20 | End: 2024-02-26 | Stop reason: HOSPADM

## 2024-02-19 RX ORDER — ONDANSETRON 4 MG/1
4 TABLET, ORALLY DISINTEGRATING ORAL EVERY 8 HOURS PRN
Status: DISCONTINUED | OUTPATIENT
Start: 2024-02-19 | End: 2024-02-20

## 2024-02-19 RX ORDER — ATORVASTATIN CALCIUM 40 MG/1
40 TABLET, FILM COATED ORAL
COMMUNITY

## 2024-02-19 RX ORDER — HYDROCODONE BITARTRATE AND ACETAMINOPHEN 5; 325 MG/1; MG/1
1 TABLET ORAL ONCE
Status: DISCONTINUED | OUTPATIENT
Start: 2024-02-19 | End: 2024-02-19

## 2024-02-19 RX ORDER — HYDROCORTISONE 10 MG/1
TABLET ORAL
COMMUNITY

## 2024-02-19 RX ORDER — MIDODRINE HYDROCHLORIDE 10 MG/1
10 TABLET ORAL 3 TIMES DAILY
COMMUNITY

## 2024-02-19 RX ORDER — SODIUM CHLORIDE 9 MG/ML
INJECTION, SOLUTION INTRAVENOUS PRN
Status: DISCONTINUED | OUTPATIENT
Start: 2024-02-19 | End: 2024-02-26 | Stop reason: HOSPADM

## 2024-02-19 RX ORDER — MIDODRINE HYDROCHLORIDE 5 MG/1
5 TABLET ORAL ONCE
Status: COMPLETED | OUTPATIENT
Start: 2024-02-19 | End: 2024-02-19

## 2024-02-19 RX ORDER — POTASSIUM CHLORIDE 20 MEQ/1
40 TABLET, EXTENDED RELEASE ORAL ONCE
Status: COMPLETED | OUTPATIENT
Start: 2024-02-19 | End: 2024-02-19

## 2024-02-19 RX ORDER — MAGNESIUM SULFATE IN WATER 40 MG/ML
2000 INJECTION, SOLUTION INTRAVENOUS PRN
Status: DISCONTINUED | OUTPATIENT
Start: 2024-02-19 | End: 2024-02-20

## 2024-02-19 RX ORDER — LIDOCAINE AND PRILOCAINE 25; 25 MG/G; MG/G
CREAM TOPICAL
COMMUNITY

## 2024-02-19 RX ORDER — MIDODRINE HYDROCHLORIDE 5 MG/1
5 TABLET ORAL
Status: DISCONTINUED | OUTPATIENT
Start: 2024-02-19 | End: 2024-02-19

## 2024-02-19 RX ADMIN — SODIUM CHLORIDE 500 ML: 9 INJECTION, SOLUTION INTRAVENOUS at 11:54

## 2024-02-19 RX ADMIN — LINEZOLID 600 MG: 2 INJECTION, SOLUTION INTRAVENOUS at 16:01

## 2024-02-19 RX ADMIN — POTASSIUM CHLORIDE 40 MEQ: 1500 TABLET, EXTENDED RELEASE ORAL at 11:56

## 2024-02-19 RX ADMIN — SODIUM CHLORIDE 500 ML: 9 INJECTION, SOLUTION INTRAVENOUS at 14:30

## 2024-02-19 RX ADMIN — SODIUM CHLORIDE 5 MCG/MIN: 9 INJECTION, SOLUTION INTRAVENOUS at 17:15

## 2024-02-19 RX ADMIN — CEFEPIME 2000 MG: 2 INJECTION, POWDER, FOR SOLUTION INTRAVENOUS at 15:08

## 2024-02-19 RX ADMIN — MIDODRINE HYDROCHLORIDE 5 MG: 5 TABLET ORAL at 10:30

## 2024-02-19 RX ADMIN — SODIUM CHLORIDE 500 ML: 9 INJECTION, SOLUTION INTRAVENOUS at 15:45

## 2024-02-19 RX ADMIN — IOPAMIDOL 75 ML: 755 INJECTION, SOLUTION INTRAVENOUS at 21:04

## 2024-02-19 ASSESSMENT — PAIN DESCRIPTION - ORIENTATION: ORIENTATION: RIGHT

## 2024-02-19 ASSESSMENT — PAIN DESCRIPTION - LOCATION: LOCATION: EYE

## 2024-02-19 ASSESSMENT — PAIN - FUNCTIONAL ASSESSMENT: PAIN_FUNCTIONAL_ASSESSMENT: 0-10

## 2024-02-19 ASSESSMENT — PAIN SCALES - GENERAL: PAINLEVEL_OUTOF10: 7

## 2024-02-19 NOTE — ED PROVIDER NOTES
I independently reviewed the ECG as follows:    Atrial fibrillation with ventricular rate of of 65 no ectopy.  Leftward axis -38.  Unable to calculate ALEXIS.   with left bundle branch block.  QTc mildly prolonged at 486.  No evidence of acute ischemia.  Negative by Sgarbossa criteria.  Overall morphology is not significantly changed from prior dated January 5, 2024.    Please reference my attending note if I had further involvement in the care of this patient otherwise I did not participate in the care of this patient beyond evaluation of this ECG.  Please reference the EDILIA's documentation for further details.        Andres Baptiste MD  02/19/24 105      I independently performed a history and physical on Kanwal Horta.     I have discussed the case with the EDILIA/resident at 1300 and approve / take responsibility for the initial management plan and anticipated disposition as documented below.     In summary the patient presents with recurrent episodes of weakness of his bilateral upper extremities in the context of dialysis.  The patient is dialysis dependent taking dialysis Monday Wednesday Friday.  He missed dialysis on Friday and had a make-up session on Saturday during which time he developed some discomfort and weakness in his left upper arm.  After dialysis he visited some type of therapy/massage which seemed to improve his condition and he did fine for the remainder of Saturday and Sunday however today at dialysis he now developed some discomfort in his right upper extremity as well as possible weakness at the shoulder.  Given this he presents for evaluation    On my evaluation the patient is relatively hypotensive status post dialysis today.  He is afebrile otherwise hemodynamically stable.  He continues to describe some achiness versus weakness in bilateral upper extremities particular at the level of the shoulder right greater than left.  On exam his breath sounds are clear his abdomen is soft

## 2024-02-19 NOTE — ED NOTES
1312 - Call placed to [facility] for consult with Neurosurgery     1320 - Richy Sanchez connected with Kevin the NP at [facility] to complete the consult

## 2024-02-19 NOTE — PLAN OF CARE
Hypotension  Hx of MSSA  Adrenal Insufficiency  Central line placed in ER  Levophed started      ICU

## 2024-02-19 NOTE — ED PROVIDER NOTES
[facility] [place]  EMERGENCY DEPARTMENT ENCOUNTER        Pt Name: Kanwal Horta  MRN: [id number]  Birthdate 1965  Date of evaluation: 2/19/2024  Provider: JOSEFINA Mon - CNP  PCP: Silvia Boswell MD  Note Started: 1:24 PM EST 2/19/24       I have seen and evaluated this patient with my supervising physician Dr. Baptiste       CHIEF COMPLAINT       Chief Complaint   Patient presents with    Numbness     From [facility]. Ems reports numbness x 2 days and right eye pain. Hx dialysis.        HISTORY OF PRESENT ILLNESS: 1 or more Elements     History From: Patient     Limitations to history : None    Social Determinants Significantly Affecting Health : None    Chief Complaint:Shoulder pain and headache     Kanwal Horta is a 58 y.o. male who presents to the emergency department today with symptoms of shoulder pain and headache.  Patient states that he just needs a pain pill and go back to the nursing home.  Ultimately presented here after dialysis.  He states that he received about half of treatment.  States that he deals with hypotension particularly on a daily basis but also particular after dialysis.  He was provided midodrine before dialysis.  He usually takes 1 afterward.  Ultimately upon his arrival he was hypotensive with a blood pressure reading in the 70s.  He denies any headache at this time.  No nausea no vomiting.  No chest pain or abdominal pain.  States he does not make any urine at baseline.  Completely hemodialysis dependant.    Nursing Notes were all reviewed and agreed with or any disagreements were addressed in the HPI.    REVIEW OF SYSTEMS :      Review of Systems   Constitutional:  Negative for fever.   HENT:  Negative for nosebleeds, trouble swallowing and voice change.    Respiratory:  Negative for shortness of breath and wheezing.    Cardiovascular:  Negative for chest pain.   Gastrointestinal:  Negative for constipation, nausea and vomiting.   Genitourinary:  Negative for testicular pain.  Reported off to primary nurse.  Speech therapy in room with client body heights are maintained. No osseous destructive lesion is seen. DEGENERATIVE CHANGES: Intervertebral disc space narrowing family at the C5-6 level.  Endplate osteophyte formation at multiple levels in the cervical spine.  Mild offset of the left C1-2 articulation likely related to patient positioning.  Cervical occipital junction is intact. SOFT TISSUES/RETROPERITONEUM: 1.8 cm nodule in the left lobe of the thyroid. Vascular calcifications in the neck.  Remainder of the imaged paraspinous soft tissues are unremarkable in appearance. Prominent varicosities in the left supraclavicular soft tissues.  This is stable in appearance when compared to the January 2, 2024 exam.     Possible area of prior hemorrhage involving the nicole.  Further characterization with MRI may be helpful. Age-related changes the brain findings likely related to microvascular ischemic disease. Spondylosis of the cervical spine.  No fracture. Stable prominent varicosities in the left supraclavicular soft tissues. Left thyroid nodule.  Nonemergent additional evaluation with thyroid ultrasound may be helpful.     CT CERVICAL SPINE WO CONTRAST    Result Date: 2/19/2024  EXAMINATION: CT OF THE HEAD WITHOUT CONTRAST; CT OF THE CERVICAL SPINE WITHOUT CONTRAST 2/19/2024 10:58 am; 2/19/2024 10:59 am TECHNIQUE: CT of the head was performed without the administration of intravenous contrast. Automated exposure control, iterative reconstruction, and/or weight based adjustment of the mA/kV was utilized to reduce the radiation dose to as low as reasonably achievable.; CT of the cervical spine was performed without the administration of intravenous contrast. Multiplanar reformatted images are provided for review. Automated exposure control, iterative reconstruction, and/or weight based adjustment of the mA/kV was utilized to reduce the radiation dose to as low as reasonably achievable. COMPARISON: None. HISTORY: ORDERING SYSTEM PROVIDED HISTORY: Headache

## 2024-02-20 ENCOUNTER — APPOINTMENT (OUTPATIENT)
Dept: ULTRASOUND IMAGING | Age: 59
DRG: 314 | End: 2024-02-20
Payer: MEDICARE

## 2024-02-20 PROBLEM — R57.9 SHOCK (HCC): Status: ACTIVE | Noted: 2024-02-20

## 2024-02-20 PROBLEM — E27.1 ADDISON'S DISEASE (HCC): Status: ACTIVE | Noted: 2024-02-20

## 2024-02-20 PROBLEM — J18.9 PNEUMONIA DUE TO INFECTIOUS ORGANISM: Status: ACTIVE | Noted: 2024-02-20

## 2024-02-20 LAB
ALBUMIN SERPL-MCNC: 3.1 G/DL (ref 3.4–5)
ALBUMIN/GLOB SERPL: 0.8 {RATIO} (ref 1.1–2.2)
ALP SERPL-CCNC: 68 U/L (ref 40–129)
ALT SERPL-CCNC: <5 U/L (ref 10–40)
ANION GAP SERPL CALCULATED.3IONS-SCNC: 15 MMOL/L (ref 3–16)
AST SERPL-CCNC: 18 U/L (ref 15–37)
BILIRUB SERPL-MCNC: 0.7 MG/DL (ref 0–1)
BUN SERPL-MCNC: 29 MG/DL (ref 7–20)
CALCIUM SERPL-MCNC: 8.4 MG/DL (ref 8.3–10.6)
CHLORIDE SERPL-SCNC: 94 MMOL/L (ref 99–110)
CO2 SERPL-SCNC: 24 MMOL/L (ref 21–32)
CORTIS SERPL-MCNC: 3.2 UG/DL
CREAT SERPL-MCNC: 3.9 MG/DL (ref 0.9–1.3)
DEPRECATED RDW RBC AUTO: 19.4 % (ref 12.4–15.4)
GFR SERPLBLD CREATININE-BSD FMLA CKD-EPI: 17 ML/MIN/{1.73_M2}
GLUCOSE BLD-MCNC: 109 MG/DL (ref 70–99)
GLUCOSE SERPL-MCNC: 114 MG/DL (ref 70–99)
HCT VFR BLD AUTO: 31.6 % (ref 40.5–52.5)
HCT VFR BLD AUTO: 32.5 % (ref 40.5–52.5)
HGB BLD-MCNC: 10.1 G/DL (ref 13.5–17.5)
HGB BLD-MCNC: 10.4 G/DL (ref 13.5–17.5)
LACTATE BLDV-SCNC: 1.6 MMOL/L (ref 0.4–2)
MCH RBC QN AUTO: 31.2 PG (ref 26–34)
MCHC RBC AUTO-ENTMCNC: 32.1 G/DL (ref 31–36)
MCV RBC AUTO: 97.3 FL (ref 80–100)
MRSA DNA SPEC QL NAA+PROBE: NORMAL
PERFORMED ON: ABNORMAL
PLATELET # BLD AUTO: 214 K/UL (ref 135–450)
PMV BLD AUTO: 8.1 FL (ref 5–10.5)
POTASSIUM SERPL-SCNC: 4.5 MMOL/L (ref 3.5–5.1)
PROCALCITONIN SERPL IA-MCNC: 1.45 NG/ML (ref 0–0.15)
PROT SERPL-MCNC: 7 G/DL (ref 6.4–8.2)
RBC # BLD AUTO: 3.25 M/UL (ref 4.2–5.9)
REASON FOR REJECTION: NORMAL
REJECTED TEST: NORMAL
SODIUM SERPL-SCNC: 133 MMOL/L (ref 136–145)
TSH SERPL DL<=0.005 MIU/L-ACNC: 1.39 UIU/ML (ref 0.27–4.2)
WBC # BLD AUTO: 8.8 K/UL (ref 4–11)

## 2024-02-20 PROCEDURE — 36415 COLL VENOUS BLD VENIPUNCTURE: CPT

## 2024-02-20 PROCEDURE — A4216 STERILE WATER/SALINE, 10 ML: HCPCS | Performed by: INTERNAL MEDICINE

## 2024-02-20 PROCEDURE — 87641 MR-STAPH DNA AMP PROBE: CPT

## 2024-02-20 PROCEDURE — 76536 US EXAM OF HEAD AND NECK: CPT

## 2024-02-20 PROCEDURE — 99233 SBSQ HOSP IP/OBS HIGH 50: CPT | Performed by: INTERNAL MEDICINE

## 2024-02-20 PROCEDURE — 36592 COLLECT BLOOD FROM PICC: CPT

## 2024-02-20 PROCEDURE — 85018 HEMOGLOBIN: CPT

## 2024-02-20 PROCEDURE — 2580000003 HC RX 258: Performed by: INTERNAL MEDICINE

## 2024-02-20 PROCEDURE — 6360000002 HC RX W HCPCS: Performed by: INTERNAL MEDICINE

## 2024-02-20 PROCEDURE — 2000000000 HC ICU R&B

## 2024-02-20 PROCEDURE — 6370000000 HC RX 637 (ALT 250 FOR IP): Performed by: INTERNAL MEDICINE

## 2024-02-20 PROCEDURE — 2700000000 HC OXYGEN THERAPY PER DAY

## 2024-02-20 PROCEDURE — C9113 INJ PANTOPRAZOLE SODIUM, VIA: HCPCS | Performed by: INTERNAL MEDICINE

## 2024-02-20 PROCEDURE — 85014 HEMATOCRIT: CPT

## 2024-02-20 PROCEDURE — 85027 COMPLETE CBC AUTOMATED: CPT

## 2024-02-20 PROCEDURE — 83605 ASSAY OF LACTIC ACID: CPT

## 2024-02-20 PROCEDURE — 84443 ASSAY THYROID STIM HORMONE: CPT

## 2024-02-20 PROCEDURE — 80053 COMPREHEN METABOLIC PANEL: CPT

## 2024-02-20 PROCEDURE — 93308 TTE F-UP OR LMTD: CPT

## 2024-02-20 PROCEDURE — 84145 PROCALCITONIN (PCT): CPT

## 2024-02-20 PROCEDURE — 99223 1ST HOSP IP/OBS HIGH 75: CPT | Performed by: PSYCHIATRY & NEUROLOGY

## 2024-02-20 PROCEDURE — 94761 N-INVAS EAR/PLS OXIMETRY MLT: CPT

## 2024-02-20 PROCEDURE — 99223 1ST HOSP IP/OBS HIGH 75: CPT | Performed by: INTERNAL MEDICINE

## 2024-02-20 RX ORDER — PROCHLORPERAZINE EDISYLATE 5 MG/ML
10 INJECTION INTRAMUSCULAR; INTRAVENOUS EVERY 6 HOURS PRN
Status: DISCONTINUED | OUTPATIENT
Start: 2024-02-20 | End: 2024-02-26 | Stop reason: HOSPADM

## 2024-02-20 RX ORDER — DIPHENHYDRAMINE HYDROCHLORIDE 50 MG/ML
25 INJECTION INTRAMUSCULAR; INTRAVENOUS EVERY 6 HOURS PRN
Status: DISCONTINUED | OUTPATIENT
Start: 2024-02-20 | End: 2024-02-26 | Stop reason: HOSPADM

## 2024-02-20 RX ORDER — POTASSIUM CHLORIDE 7.45 MG/ML
10 INJECTION INTRAVENOUS ONCE
Status: COMPLETED | OUTPATIENT
Start: 2024-02-20 | End: 2024-02-20

## 2024-02-20 RX ORDER — HEPARIN SODIUM 5000 [USP'U]/ML
5000 INJECTION, SOLUTION INTRAVENOUS; SUBCUTANEOUS EVERY 8 HOURS SCHEDULED
Status: DISCONTINUED | OUTPATIENT
Start: 2024-02-20 | End: 2024-02-26 | Stop reason: HOSPADM

## 2024-02-20 RX ORDER — DIPHENHYDRAMINE HYDROCHLORIDE 50 MG/ML
25 INJECTION INTRAMUSCULAR; INTRAVENOUS EVERY 6 HOURS PRN
Status: DISCONTINUED | OUTPATIENT
Start: 2024-02-20 | End: 2024-02-20 | Stop reason: SDUPTHER

## 2024-02-20 RX ORDER — 0.9 % SODIUM CHLORIDE 0.9 %
500 INTRAVENOUS SOLUTION INTRAVENOUS ONCE
Status: COMPLETED | OUTPATIENT
Start: 2024-02-20 | End: 2024-02-20

## 2024-02-20 RX ORDER — CASTOR OIL AND BALSAM, PERU 788; 87 MG/G; MG/G
OINTMENT TOPICAL 2 TIMES DAILY
Status: DISCONTINUED | OUTPATIENT
Start: 2024-02-20 | End: 2024-02-26 | Stop reason: HOSPADM

## 2024-02-20 RX ORDER — MAGNESIUM SULFATE 1 G/100ML
1000 INJECTION INTRAVENOUS ONCE
Status: COMPLETED | OUTPATIENT
Start: 2024-02-20 | End: 2024-02-20

## 2024-02-20 RX ADMIN — MIDODRINE HYDROCHLORIDE 10 MG: 10 TABLET ORAL at 17:57

## 2024-02-20 RX ADMIN — POTASSIUM CHLORIDE 10 MEQ: 7.46 INJECTION, SOLUTION INTRAVENOUS at 01:33

## 2024-02-20 RX ADMIN — HYDROCORTISONE SODIUM SUCCINATE 100 MG: 100 INJECTION, POWDER, FOR SOLUTION INTRAMUSCULAR; INTRAVENOUS at 01:29

## 2024-02-20 RX ADMIN — SODIUM CHLORIDE 500 ML: 9 INJECTION, SOLUTION INTRAVENOUS at 01:31

## 2024-02-20 RX ADMIN — HEPARIN SODIUM 5000 UNITS: 5000 INJECTION INTRAVENOUS; SUBCUTANEOUS at 13:37

## 2024-02-20 RX ADMIN — Medication 10 ML: at 10:06

## 2024-02-20 RX ADMIN — HYDROCORTISONE SODIUM SUCCINATE 100 MG: 100 INJECTION, POWDER, FOR SOLUTION INTRAMUSCULAR; INTRAVENOUS at 10:06

## 2024-02-20 RX ADMIN — DIPHENHYDRAMINE HYDROCHLORIDE 25 MG: 50 INJECTION INTRAMUSCULAR; INTRAVENOUS at 01:42

## 2024-02-20 RX ADMIN — DRONEDARONE 400 MG: 400 TABLET, FILM COATED ORAL at 17:57

## 2024-02-20 RX ADMIN — HYDROCORTISONE SODIUM SUCCINATE 100 MG: 100 INJECTION, POWDER, FOR SOLUTION INTRAMUSCULAR; INTRAVENOUS at 17:57

## 2024-02-20 RX ADMIN — Medication: at 20:19

## 2024-02-20 RX ADMIN — DRONEDARONE 400 MG: 400 TABLET, FILM COATED ORAL at 11:19

## 2024-02-20 RX ADMIN — PANTOPRAZOLE SODIUM 40 MG: 40 INJECTION, POWDER, FOR SOLUTION INTRAVENOUS at 10:06

## 2024-02-20 RX ADMIN — PANTOPRAZOLE SODIUM 40 MG: 40 INJECTION, POWDER, FOR SOLUTION INTRAVENOUS at 20:18

## 2024-02-20 RX ADMIN — HEPARIN SODIUM 5000 UNITS: 5000 INJECTION INTRAVENOUS; SUBCUTANEOUS at 22:41

## 2024-02-20 RX ADMIN — Medication 10 ML: at 20:18

## 2024-02-20 RX ADMIN — MUPIROCIN: 20 OINTMENT TOPICAL at 10:06

## 2024-02-20 RX ADMIN — VANCOMYCIN HYDROCHLORIDE 2000 MG: 500 INJECTION, POWDER, LYOPHILIZED, FOR SOLUTION INTRAVENOUS at 01:54

## 2024-02-20 RX ADMIN — MIDODRINE HYDROCHLORIDE 10 MG: 10 TABLET ORAL at 10:06

## 2024-02-20 RX ADMIN — MAGNESIUM SULFATE HEPTAHYDRATE 1000 MG: 1 INJECTION, SOLUTION INTRAVENOUS at 01:33

## 2024-02-20 RX ADMIN — MIDODRINE HYDROCHLORIDE 10 MG: 10 TABLET ORAL at 13:37

## 2024-02-20 RX ADMIN — CEFTRIAXONE SODIUM 2000 MG: 2 INJECTION, POWDER, FOR SOLUTION INTRAMUSCULAR; INTRAVENOUS at 05:47

## 2024-02-20 ASSESSMENT — PAIN DESCRIPTION - ORIENTATION: ORIENTATION: RIGHT;LEFT

## 2024-02-20 ASSESSMENT — PAIN DESCRIPTION - DESCRIPTORS: DESCRIPTORS: ACHING;NUMBNESS

## 2024-02-20 ASSESSMENT — PAIN DESCRIPTION - ONSET: ONSET: ON-GOING

## 2024-02-20 ASSESSMENT — PAIN SCALES - GENERAL: PAINLEVEL_OUTOF10: 6

## 2024-02-20 ASSESSMENT — PAIN - FUNCTIONAL ASSESSMENT: PAIN_FUNCTIONAL_ASSESSMENT: PREVENTS OR INTERFERES SOME ACTIVE ACTIVITIES AND ADLS

## 2024-02-20 ASSESSMENT — PAIN DESCRIPTION - LOCATION: LOCATION: HAND

## 2024-02-20 ASSESSMENT — PAIN DESCRIPTION - PAIN TYPE: TYPE: ACUTE PAIN

## 2024-02-20 NOTE — PROGRESS NOTES
Reassessment completed, see flowsheets, unchanged from prior. VSS.   Levophed 4 mcg/min.     All ICU Lines and monitoring remain in place. Bed locked in lowest position. Call light within reach.

## 2024-02-20 NOTE — PROGRESS NOTES
Completed MRI screening form. Pt states he will NOT be able to lay down for the MRI for 30 minutes d/t claustrophobia. He stated medication might help, but it might now.   Awaiting to speak with Dr. Barone to updated him.

## 2024-02-20 NOTE — H&P
Hospital Medicine History & Physical      PCP: Silvia Boswell MD    Date of Admission: 2/19/2024    Date of Service: Pt seen/examined on 2/19/2024    Pt seen/examined face to face on and admitted as inpatient with expected LOS to be two days but can change depending on diagnostic work up and treatment response.     Chief Complaint:    Chief Complaint   Patient presents with    Numbness     From [facility]. Ems reports numbness x 2 days and right eye pain. Hx dialysis.             ASSESSMENT AND PLAN:    Active Hospital Problems    Diagnosis Date Noted    Hypotension [I95.9] 02/19/2024     Shock:  Etiology unclear however suspected adrenal in etiology  Patient was recently received cefazolin ended on 02/14/2024  Repeat blood cultures obtained,, chest x-ray showing bilateral effusion with suspected of left-sided pneumonia.  Patient was given 2 L of fluid with minimal improvement  Echo to rule out cardiac etiology  Cortisol level check for adrenal insufficiency  Trend H&H to rule out hemorrhagic  Empirically on hydrocortisone and Levophed    Left-sided ataxia with weakness:  Last known well> 48 hours ago  MRI brain ordered    7 mm right lateral cheek cutaneous nodule:  Ultrasound ordered    9 mm left thyroid nodule:  Thyroid ultrasound ordered    Acute blood loss anemia: Hemoglobin 9.4, was recently discharged for acute GI bleed from erosive esophagitis.  Hemoccult pending    Adrenal insufficiency:  Cortisol level ordered  Hydrocortisone 100 mg every 8    Hypokalemia: One-time dose given  Hypomagnesemia: One-time given    End-stage renal disease: Nephrology consulted      .Due to the above diagnosis makes the patient higher risk for morbidity and mortality requiring testing and treatment      Discussion with the primary ER physician in regards to symptoms, history, physical exam, diagnosis and treatment, collaborative decision was to admit the patient.    Diet: NPO except meds ordered    DVT Prophylaxis: held    Dispo:  rhythm, capillary refill 2 seconds  Abdomen: Soft, non-tender, non-distended with normal bowel sounds.  Musculoskeletal:  No clubbing, cyanosis. trace edema LE bilaterally.   Skin: turgor normal.  No new rashes or lesions.  Neurologic: Alert and oriented x4, no new focal sensory/motor deficits.   Noted patient does have ataxia left-sided with pronator drift, NIHSS 1  Labs:     Recent Labs     02/19/24  1024   WBC 6.7   HGB 9.4*   HCT 29.5*        Recent Labs     02/19/24  1024      K 3.0*   CL 94*   CO2 28   BUN 23*   CREATININE 3.2*   CALCIUM 8.4     Recent Labs     02/19/24  1024   AST 20   ALT <5*   BILITOT 0.6   ALKPHOS 57     No results for input(s): \"INR\" in the last 72 hours.  No results for input(s): \"CKTOTAL\", \"TROPONINI\" in the last 72 hours.    Urinalysis:      Lab Results   Component Value Date/Time    NITRU NEGATIVE 12/23/2012 07:00 AM    WBCUA Rare 12/23/2012 07:00 AM    BACTERIA 1+ 12/23/2012 07:00 AM    RBCUA TNTC  H 12/23/2012 07:00 AM    BLOODU LARGE 12/23/2012 07:00 AM    SPECGRAV 1.010 12/23/2012 07:00 AM    GLUCOSEU NEGATIVE 12/23/2012 07:00 AM       Radiology:     CXR: I have reviewed the CXR with the following interpretation:   Central line placed for  EKG:  I have reviewed the EKG with the following interpretation:   Atrial fibrillation, QTc 534    .  CTA HEAD NECK W CONTRAST   Final Result   1. No evidence of intracranial aneurysm or active extravasation.   2. Moderate to severe narrowing of the bilateral vertebral artery origins.   3. No evidence for arterial occlusion or significant stenosis in the head or   neck.   4. 7 mm right lateral cheek cutaneous nodule.  Correlate with physical   examination.   5. 9 mm left thyroid nodule for which follow-up nonemergent thyroid   ultrasound was previously suggested.         CT HEAD WO CONTRAST   Final Result   1. Stable small area of increased attenuation in the nicole which could   represent a cavernous malformation.  Recent

## 2024-02-20 NOTE — PROGRESS NOTES
Care rounds completed with Dr. Donovan and multidisciplinary team. Reviewed labs, meds, VS, assessment, & plan of care for today. See dictated note and new orders for details.       Consult neurology  Potentially consult cardiology  Start heparin subcutaneous  Pna panel  Check procalcitonin   DC vanc

## 2024-02-20 NOTE — PROGRESS NOTES
Spoke with MRI. Stated unable to complete MRI here as they can not scan patient with defibrillator nor watchman. Perfect serve sent to Dr. Estevez .

## 2024-02-20 NOTE — PROGRESS NOTES
Dr. Estevez at the bedside to examine pt. See progress note for details.     Okay for diet.   No need for US of cheek

## 2024-02-20 NOTE — PROGRESS NOTES
Admit: 2024    Name:  Kanwal Horta  Room:  [address] [place] [address]  MRN:    [id number]    Critical Care Daily Progress Note for 2024   Patient with end-stage renal disease admitted with hypotension, paresthesias and weakness of both upper extremities  He states that he usually gets hypotensive during dialysis received midodrine.  This time the hypotension persisted.    Interval History:     Currently on Levophed  RUE numbness,? Weakness persists       Scheduled Meds:   cefTRIAXone (ROCEPHIN) IV  2,000 mg IntraVENous Q24H    pantoprazole (PROTONIX) 40 mg in sodium chloride (PF) 0.9 % 10 mL injection  40 mg IntraVENous Q12H    vancomycin (VANCOCIN) intermittent dosing (placeholder)   Other RX Placeholder    midodrine  10 mg Oral TID WC    hydrocortisone sodium succinate PF  100 mg IntraVENous Q8H    sodium chloride flush  5-40 mL IntraVENous 2 times per day       Continuous Infusions:   norepinephrine 8 mcg/min (24 0707)    sodium chloride         PRN Meds:  prochlorperazine, diphenhydrAMINE, sodium chloride flush, sodium chloride, polyethylene glycol, acetaminophen **OR** acetaminophen                  Objective:     Temp  Av.1 °F (36.2 °C)  Min: 96 °F (35.6 °C)  Max: 98.1 °F (36.7 °C)  Pulse  Av  Min: 56  Max: 89  BP  Min: 58/25  Max: 152/97  SpO2  Av.2 %  Min: 90 %  Max: 100 %  Patient Vitals for the past 4 hrs:   BP Pulse Resp SpO2 Weight   24 0700 (!) 112/97 80 16 94 % --   24 0600 131/63 77 18 94 % --   24 0500 (!) 152/97 81 21 94 % 121.5 kg (267 lb 14.4 oz)         Intake/Output Summary (Last 24 hours) at 2024 0821  Last data filed at 2024 0744  Gross per 24 hour   Intake 1276.7 ml   Output --   Net 1276.7 ml       Physical Exam:    General appearance:  mild acute distress, appears older than stated age  HEENT:   atraumatic, sclera anicteric, Conjunctivae clear.  Neck: Supple,Trachea midline, no goiter  Respiratory:minimal accessory muscle usage, Normal  additional evaluation with thyroid   ultrasound may be helpful.         XR SHOULDER LEFT (MIN 2 VIEWS)   Final Result   No acute osseous abnormality      AC joint arthropathy         US THYROID    (Results Pending)   MRI BRAIN WO CONTRAST    (Results Pending)   US SOFT TISSUE LIMITED AREA    (Results Pending)     Labs and imaging reviewed    Assessment & Plan:     Patient Active Problem List    Diagnosis Date Noted    Supraventricular tachycardia 01/24/2015    Near syncope 01/02/2013    Lymphedema 03/07/2023    Non-pressure chronic ulcer of left calf with fat layer exposed ([facility]) 03/07/2023    Renal failure 12/08/2012    Hypotension 02/19/2024    Staph aureus infection 01/05/2024    Atypical chest pain 01/03/2024    Blurred vision 01/03/2024    ESRD on dialysis ([facility]) 01/03/2024    Atrial fibrillation, chronic ([facility]) 01/03/2024    Arterial hypotension 01/03/2024    Atrial fibrillation with rapid ventricular response ([facility]) 01/03/2024    AICD (automatic cardioverter/defibrillator) present 01/03/2024    Chest pain 01/02/2024    Ventricular tachycardia ([facility])     AVNRT (AV darci re-entry tachycardia) 11/26/2015    Palpitations 09/09/2015    Chronic renal failure     Morbid obesity ([facility]) 12/08/2012    Lymphedema of leg 12/08/2012    Anemia 12/08/2012    Thrombocytopenia ([facility]) 12/08/2012       Shock:  Etiology unclear however suspected adrenal in etiology  Patient was recently received cefazolin ended on 02/14/2024  Repeat blood cultures obtained,, chest x-ray showing bilateral effusion with suspected of left-sided pneumonia.  Continue IV vancomycin and cefepime  Patient was given 2 L of fluid with minimal improvement  Echo to rule out cardiac etiology  Cortisol level check for adrenal insufficiency  Trend H&H to rule out hemorrhagic- stable.  Empirically on hydrocortisone  On Levophed  Continue midodrine     Roque UE neuro symptoms  Last known well> 48 hours ago  CT head reviewed   MRI brain ordered    History of adrenal

## 2024-02-20 NOTE — PROGRESS NOTES
4 Eyes Skin Assessment     NAME:  Kanwal Horta  YOB: 1965  MEDICAL RECORD NUMBER:  [id number]    The patient is being assessed for  Admission    I agree that at least one RN has performed a thorough Head to Toe Skin Assessment on the patient. ALL assessment sites listed below have been assessed.      Areas assessed by both nurses:    Head, Face, Ears, Shoulders, Back, Chest, Arms, Elbows, Hands, Sacrum. Buttock, Coccyx, Ischium, Legs. Feet and Heels, and Under Medical Devices   Left Thigh abrasion.  Left Lower leg abrasion.      DTI present on Right Buttock 4 cm/1.5  Excoriation on Bilateral groin area, worse on right groin.                Does the Patient have a Wound? Yes wound(s) were present on assessment. LDA wound assessment was Initiated and completed by RN       Archie Prevention initiated by RN: Yes  Wound Care Orders initiated by RN: Yes    Pressure Injury (Stage 3,4, Unstageable, DTI, NWPT, and Complex wounds) if present, place Wound referral order by RN under : No    New Ostomies, if present place, Ostomy referral order under : No     Nurse 1 eSignature: Electronically signed by Trent Lr RN on 2/20/24 at 2:09 AM EST    **SHARE this note so that the co-signing nurse can place an eSignature**    Nurse 2 eSignature: Electronically signed by Ria Hutchins RN on 2/20/24 at 7:00 AM EST     Patient is not able to demonstrated the ability to move from a reclining position to an upright position within the recliner. Patient is confused, demented and /or unable to follow instruction.

## 2024-02-20 NOTE — CONSULTS
[facility]                                 Pulmonary/Critical care  Consult /Progress Note :                                                                  CC :Hypotension     Patient is being seen at the request of Dr FARRIS for a consultation for ICU management     HISTORY OF PRESENT ILLNESS:   Kanwal Horta is a 58 y.o. male with ESRD  on hemodialysis, atrial fibrillation, status post Watchman procedure, history of ventricular arrhythmia, status post AICD, history of hypotension who presents with again with hypotension        Patient reported that he normally goes to dialysis regularly his dialysis schedule is Monday Wednesday Friday has missed dialysis on Friday and noted that he went admitted up on Saturday to get dialysis.     He states that he had  left-sided upper extremity weakness progressively worsening and now having bilateral upper extremity weakness.     Patient also reports his balance has been off reports that used to be on PD dialysis however was able to tolerate it      Patient otherwise denies have any numbness tingling abdominal pain does report that he continues to urinate minimally. Patient denies ever having hemorrhagic stroke or stroke also admits to taking his medications with no active bleeding.         PAST MEDICAL HISTORY:  Past Medical History:   Diagnosis Date    Atrial fibrillation, chronic (HCC)     AVNRT (AV darci re-entry tachycardia)     End stage chronic kidney disease (HCC)     ESRD on hemodialysis (HCC)     Mon-Wed-Fri    Hemodialysis patient (HCC)     Hypotension     Lymph edema     Pneumonia 12/12/2012    SVT (supraventricular tachycardia)      PAST SURGICAL HISTORY:  Past Surgical History:   Procedure Laterality Date    ABLATION OF DYSRHYTHMIC FOCUS  09/15/2015    SVT ablation     CARDIAC DEFIBRILLATOR PLACEMENT Left     2019    DIALYSIS FISTULA CREATION  01/01/2013       FAMILY

## 2024-02-20 NOTE — CONSULTS
Neurology consultation note    Patient name: Kanwal Horta      Chief Complaint:  Transient episode of bilateral upper extremity weakness.    History of present illness:  This is a 58 years old right-handed male.  The patient was brought to the hospital yesterday due to upper extremity weakness.  Upon admission, the patient was found to have hypotension.  The patient reported the event of left upper extremity weakness during dialysis on Saturday.  The patient had spontaneous recovery after that event.  Unfortunately, the patient had another episode again yesterday during dialysis.  This time, the weakness happened on both sides.  The patient could not move both arms this morning.  However, the patient had spontaneous recovery again prior to my assessment.  The patient complains of minimal tingling sensation and throbbing pain on both hands.  But the patient denies significant weakness.  The patient denies history of CVA or seizure disorder.    Past medical history:    Past Medical History:   Diagnosis Date    Atrial fibrillation, chronic (HCC)     AVNRT (AV darci re-entry tachycardia)     End stage chronic kidney disease (HCC)     ESRD on hemodialysis (HCC)     Mon-Wed-Fri    Hemodialysis patient (HCC)     Hypotension     Lymph edema     Pneumonia 12/12/2012    SVT (supraventricular tachycardia)        Past surgical history:    Past Surgical History:   Procedure Laterality Date    ABLATION OF DYSRHYTHMIC FOCUS  09/15/2015    SVT ablation     CARDIAC DEFIBRILLATOR PLACEMENT Left     2019    DIALYSIS FISTULA CREATION  01/01/2013        Medication:    Current Facility-Administered Medications   Medication Dose Route Frequency Provider Last Rate Last Admin    cefTRIAXone (ROCEPHIN) 2,000 mg in sodium chloride 0.9 % 50 mL IVPB (mini-bag)  2,000 mg IntraVENous Q24H Danny Jerez DO   Stopped at 02/20/24 0614    prochlorperazine (COMPAZINE) injection 10 mg  10 mg IntraVENous Q6H PRN Pamella Trevino DO

## 2024-02-20 NOTE — PLAN OF CARE
Problem: Discharge Planning  Goal: Discharge to home or other facility with appropriate resources  Outcome: Progressing  Flowsheets  Taken 2/20/2024 0207  Discharge to home or other facility with appropriate resources: Identify barriers to discharge with patient and caregiver  Taken 2/20/2024 0048  Discharge to home or other facility with appropriate resources:   Identify barriers to discharge with patient and caregiver   Arrange for needed discharge resources and transportation as appropriate     Problem: Pain  Goal: Verbalizes/displays adequate comfort level or baseline comfort level  Outcome: Progressing  Flowsheets (Taken 2/20/2024 0207)  Verbalizes/displays adequate comfort level or baseline comfort level:   Encourage patient to monitor pain and request assistance   Assess pain using appropriate pain scale   Administer analgesics based on type and severity of pain and evaluate response     Problem: Safety - Adult  Goal: Free from fall injury  Outcome: Progressing  Flowsheets (Taken 2/20/2024 0207)  Free From Fall Injury: Instruct family/caregiver on patient safety     Problem: ABCDS Injury Assessment  Goal: Absence of physical injury  Outcome: Progressing  Flowsheets (Taken 2/20/2024 0207)  Absence of Physical Injury: Implement safety measures based on patient assessment     Problem: Skin/Tissue Integrity  Goal: Absence of new skin breakdown  Description: 1.  Monitor for areas of redness and/or skin breakdown  2.  Assess vascular access sites hourly  3.  Every 4-6 hours minimum:  Change oxygen saturation probe site  4.  Every 4-6 hours:  If on nasal continuous positive airway pressure, respiratory therapy assess nares and determine need for appliance change or resting period.  Outcome: Progressing     Problem: Respiratory - Adult  Goal: Achieves optimal ventilation and oxygenation  Outcome: Progressing  Flowsheets (Taken 2/20/2024 0207)  Achieves optimal ventilation and oxygenation:   Assess for changes in  respiratory status   Assess for changes in mentation and behavior   Position to facilitate oxygenation and minimize respiratory effort     Problem: Cardiovascular - Adult  Goal: Maintains optimal cardiac output and hemodynamic stability  Outcome: Progressing  Flowsheets (Taken 2/20/2024 0207)  Maintains optimal cardiac output and hemodynamic stability:   Monitor blood pressure and heart rate   Monitor urine output and notify Licensed Independent Practitioner for values outside of normal range   Assess for signs of decreased cardiac output     Problem: Metabolic/Fluid and Electrolytes - Adult  Goal: Electrolytes maintained within normal limits  Outcome: Progressing  Flowsheets (Taken 2/20/2024 0207)  Electrolytes maintained within normal limits:   Monitor labs and assess patient for signs and symptoms of electrolyte imbalances   Administer electrolyte replacement as ordered   Monitor response to electrolyte replacements, including repeat lab results as appropriate  Goal: Hemodynamic stability and optimal renal function maintained  Outcome: Progressing  Flowsheets (Taken 2/20/2024 0207)  Hemodynamic stability and optimal renal function maintained:   Monitor labs and assess for signs and symptoms of volume excess or deficit   Monitor intake, output and patient weight  Goal: Glucose maintained within prescribed range  Outcome: Progressing  Flowsheets (Taken 2/20/2024 0207)  Glucose maintained within prescribed range:   Monitor blood glucose as ordered   Assess for signs and symptoms of hyperglycemia and hypoglycemia   Administer ordered medications to maintain glucose within target range

## 2024-02-20 NOTE — CONSULTS
[facility]   Pharmacy Pharmacokinetic Monitoring Service - Vancomycin     Kanwal Horta is a 58 y.o. male starting on vancomycin therapy for sepsis/HAP (7 days). Pharmacy consulted by Stephie for monitoring and adjustment.    Target Concentration: Goal AUC/SHREYAS 400-600 mg*hr/L    Additional Antimicrobials: cefepime    Pertinent Laboratory Values:   Wt Readings from Last 1 Encounters:   02/19/24 122.5 kg (270 lb)     Temp Readings from Last 1 Encounters:   02/19/24 (!) 96.3 °F (35.7 °C) (Axillary)     Estimated Creatinine Clearance: 33 mL/min (A) (based on SCr of 3.2 mg/dL (H)).  Recent Labs     02/19/24  1024   CREATININE 3.2*   BUN 23*   WBC 6.7     Procalcitonin: ordered by [facility]    Pertinent Cultures:  Culture Date Source Results   2/19 Blood x2 In process   MRSA Nasal Swab: not ordered. Order placed by pharmacy.    Plan:  Concentration-guided dosing due to renal impairment/insufficiency  Start vancomycin with a one-time dose of 2000 mg  Renal labs as indicated   Vancomycin concentration ordered daily  in AM  Pharmacy will continue to monitor patient and adjust therapy as indicated    Pt has documented allergy to vancomycin (listed as Red Man Syndrome). Ok'd to still use vancomycin by Dr. Jerez - run at half-speed or slower. PRN Benadryl ordered if symptoms begin.     Thank you for the consult,  Bety Ordaz [facility]  2/20/2024 12:33 AM    Addendum:     Pt adamantly refused the vanc - was able to speak with him about it and got him to agree to try a 1x dose with premedication and 6-hour run time for 2 gram dose.     Bety Ordaz, AnayD, [facility], 2/20/2024 1:49 AM

## 2024-02-20 NOTE — PROGRESS NOTES
Pt admitted to Room 3010.  Pt A/O, On Room Air.  Rosebud to room. Pt has a CVC Right I/J TLC along with 2 PIV's. Levophed infusing at  16 mcg/min. Chlorhexidine bath given. Assessment complete as charted. Call light in reach. Denies other needs. Will continue to monitor.

## 2024-02-20 NOTE — PROGRESS NOTES
Reassessment completed, see flowsheets, unchanged from prior. VSS.   Levophed 2 mcg/min.     All ICU Lines and monitoring remain in place. Bed locked in lowest position. Call light within reach.

## 2024-02-20 NOTE — CONSULTS
Thank you to requesting provider:  Dr. Estevez , for asking us to see Kanwal Horta  Reason for consultation:  ESRD  Chief Complaint:  Hypotension and bilateral upper extremity weakness    History of Presenting Illness      57 y/o with history of adrenal insufficiency and ESRD admitted to the ICU with shock.  He was at dialysis and had hypotension with bilateral upper extremity weakness.  He had a history of chronic hypotension and on midodrine but with associated arm weakness he was sent to the ER.  CTA did not show evidence of a stroke.  Otherwise no issues with HD.  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        Past Medical/Surgical History      Active Ambulatory Problems     Diagnosis Date Noted    Morbid obesity ([facility]) 12/08/2012    Lymphedema of leg 12/08/2012    Anemia 12/08/2012    Thrombocytopenia ([facility]) 12/08/2012    Renal failure 12/08/2012    Near syncope 01/02/2013    Chronic renal failure     Supraventricular tachycardia 01/24/2015    Palpitations 09/09/2015    AVNRT (AV darci re-entry tachycardia) 11/26/2015    Ventricular tachycardia ([facility])     Lymphedema 03/07/2023    Non-pressure chronic ulcer of left calf with fat layer exposed ([facility]) 03/07/2023    Chest pain 01/02/2024    Atypical chest pain 01/03/2024    Blurred vision 01/03/2024    ESRD on dialysis ([facility]) 01/03/2024    Atrial fibrillation, chronic ([facility]) 01/03/2024    Arterial hypotension 01/03/2024    Atrial fibrillation with rapid ventricular response ([facility]) 01/03/2024    AICD (automatic cardioverter/defibrillator) present 01/03/2024    Staph aureus infection 01/05/2024     Resolved Ambulatory Problems     Diagnosis Date Noted    Sepsis ([facility]) 12/08/2012    Cellulitis of leg, left 12/08/2012    Hyponatremia 12/08/2012    Metabolic acidosis 12/08/2012    Hypochloremia 12/08/2012    Hypomagnesemia 12/08/2012    Leukocytosis 12/08/2012    Elevated troponin

## 2024-02-20 NOTE — PROGRESS NOTES
Shift assessment, completed, see flow sheet. Pt is alert and oriented x 4. Following commands.     Afib 82, /70 (85), SpO2 94% on RA. Respirations are easy, even, and unlabored. Bilateral lung sounds diminished.     2 PIVs, WNL with vancomycin infusing. RIJ CVC, WNL, levophed 8mg/hr.    LUE fistula +B/+T    Call light within reach. Bed in lowest position. Bed alarm on.

## 2024-02-20 NOTE — CONSULTS
Consultation Note    Patient Name: Kanwal Horta  : 1965  Age: 58 y.o.     Admitting Physician: Pamella Trevino DO   Date of Admission: 2024  9:53 AM   Primary Care Physician: Silvia Boswell MD        Kanwal Horta is being seen at the request of Pamella Trevino DO for acute blood loss anemia..    History of Present Illness:  58-year-old male with past medical history of atrial fibrillation, end-stage chronic kidney disease on hemodialysis, admitted with weakness and hypotension.  Blood test revealed evidence of anemia, therefore a GI consult was requested.  Patient denies any specific GI symptoms.  He has no abdominal pain.  He denies nausea or emesis.  Denies noticing blood in stools or black tarry stools.  Patient had a similar presentation recently.  He was evaluated with an upper endoscopy and capsule endoscopy recently.  A source for GI blood loss was not identified.  Patient had changes consistent with mild gastritis.  He was prescribed PPI.      GI History:  EGD and colonoscopy on 2021: Findings consistent with erosive esophagitis and colon polyps.  EGD and capsule endoscopy on 24: Mild erosive gastritis.  Capsule endoscopy did not show any ulcers or AVMs.  Antral biopsies revealed benign gastric mucosa.  No evidence of H. pylori infection.      Past Medical History:  Past Medical History:   Diagnosis Date    Atrial fibrillation, chronic (HCC)     AVNRT (AV darci re-entry tachycardia)     End stage chronic kidney disease (HCC)     ESRD on hemodialysis (HCC)     Mon-Wed-Fri    Hemodialysis patient (HCC)     Hypotension     Lymph edema     Pneumonia 2012    SVT (supraventricular tachycardia)         Past Surgical History:  Past Surgical History:   Procedure Laterality Date    ABLATION OF DYSRHYTHMIC FOCUS  09/15/2015    SVT ablation     CARDIAC DEFIBRILLATOR PLACEMENT Left     2019    DIALYSIS FISTULA CREATION  2013        Historical Medications:  Prior to Visit Medications  left lower lobe.  There is  subpleural consolidation along the posteroinferior aspect the left lower  lobe, consistent with atelectasis.  No suspicious lung nodules are observed.    Pleura: Minimal left pleural fluid.  No pneumothorax.    Upper Abdomen: The visualized portions of the upper abdomen demonstrate no  acute abnormalities.    Soft Tissues/Bones: No acute soft tissue abnormalities.  No acute osseous  abnormality or suspicious osseous lesion is identified.  Impression: Mucous plugging in the left lower lobe with associated consolidation and  volume loss, most consistent with atelectasis.  A small amount of patchy  consolidative opacities in left lower lobe may reflect a superimposed  infectious or inflammatory process.    Minimal left pleural fluid.    Enlarged main pulmonary artery measuring 3.6 cm consistent with pulmonary  artery hypertension.  XR CHEST PORTABLE  Narrative: EXAMINATION:  ONE XRAY VIEW OF THE CHEST    2/19/2024 11:47 am    COMPARISON:  CXR dated 1/3/2024    HISTORY:  ORDERING SYSTEM PROVIDED HISTORY: Hypotension  TECHNOLOGIST PROVIDED HISTORY:  Reason for exam:->Hypotension  Reason for Exam: hypotension    FINDINGS:  Medical devices: Watchman device.  A stimulator over the heart, unchanged.    Mediastinum/Heart: The mediastinal contours are unchanged compared to prior  exam. The heart is enlarged in size.    Lungs: Patchy opacities in the right lung base.  A near confluent  opacification of the retrocardiac left lung.    Pleura: Small bilateral effusions.  No pneumothorax.  Impression: 1. Patchy opacities in the right lung base.  A near confluent opacification  of the retrocardiac left lung.  2. Small bilateral effusions.  3. Stable cardiomegaly.  CT CERVICAL SPINE WO CONTRAST  Narrative: EXAMINATION:  CT OF THE HEAD WITHOUT CONTRAST; CT OF THE CERVICAL SPINE WITHOUT CONTRAST  2/19/2024 10:58 am; 2/19/2024 10:59 am    TECHNIQUE:  CT of the head was performed without the administration of

## 2024-02-20 NOTE — ED NOTES
Pt laying in bed eyes open, AAOx3, NAD, resp e/u on RA, bed locked lowest position, rails up x2, call light within reach, pt on Levophed to [place] Line infusing well without complication, pt denies needs at this time.

## 2024-02-20 NOTE — PROGRESS NOTES
Additive QT interval prolongation may occur with ondansetron. Arrhythmias occur most commonly when QTC >500.  Patient's QTC is 534.  Changed ondansetron to Compazine per policy.     Bety Ordaz PharmD, [facility], 2/20/2024 12:12 AM

## 2024-02-20 NOTE — PROGRESS NOTES
Dr. Barone at the bedside to examine pt. See progress note for details.     Made aware MRI unable to be completed here d/t watchman.

## 2024-02-20 NOTE — CONSULTS
[facility] Nurse  Consult Note       NAME:  Kanwal Horta  MEDICAL RECORD NUMBER:  [id number]  AGE: 58 y.o.   GENDER: male  : 1965  TODAY'S DATE:  2024    Subjective  Pt alert and oriented   Reason for WOCN Evaluation and Assessment: right buttock      Kanwal Horta is a 58 y.o. male referred by:   [] Physician  [] Nursing  [] Other:     Wound Identification:  Wound Type: pressure vs chronic discoloration  Contributing Factors: chronic pressure, decreased mobility, obesity, and incontinence of stool    Pt seen for wound care.  Pt admitted in ICU setting from [facility] for numbness x 2 days.  Neurology consulted.  Pt able to assist with turning in bed.  Incontinent of stool currently.  Pt denies pain in right buttock when palpated.      Patient Goal of Care:  [x] Wound Healing  [] Odor Control  [] Palliative Care  [] Pain Control   [] Other:         PAST MEDICAL HISTORY        Diagnosis Date    Atrial fibrillation, chronic (HCC)     AVNRT (AV darci re-entry tachycardia)     End stage chronic kidney disease (HCC)     ESRD on hemodialysis (HCC)     Mon-Wed-Fri    Hemodialysis patient (HCC)     Hypotension     Lymph edema     Pneumonia 2012    SVT (supraventricular tachycardia)        PAST SURGICAL HISTORY    Past Surgical History:   Procedure Laterality Date    ABLATION OF DYSRHYTHMIC FOCUS  09/15/2015    SVT ablation     CARDIAC DEFIBRILLATOR PLACEMENT Left     2019    DIALYSIS FISTULA CREATION  2013       FAMILY HISTORY    Family History   Problem Relation Age of Onset    Heart Disease Mother     Diabetes Mother     Heart Disease Father     Stroke Father     Stroke Maternal Grandfather        SOCIAL HISTORY    Social History     Tobacco Use    Smoking status: Former     Types: Cigars, Cigarettes    Smokeless tobacco: Never    Tobacco comments:     cigars   Vaping Use    Vaping Use: Never used   Substance Use Topics    Alcohol use: Yes     Comment: very rarely    Drug

## 2024-02-20 NOTE — CONSULTS
Pharmacy consulted by Dr. Trevino to dose cefepime for ESRD.    Pharmacy also received order to begin ceftriaxone from Dr. Jerez.     Per Dr. Jerez, he would prefer patient to be on ceftriaxone as he does not like giving cefepime to ESRD patients.     Ceftriaxone verified as ordered, cefepime not started.     Bety Ordaz, AnayD, [facility], 2/20/2024 2:14 AM

## 2024-02-21 PROBLEM — I42.8 NICM (NONISCHEMIC CARDIOMYOPATHY) (HCC): Status: ACTIVE | Noted: 2024-02-21

## 2024-02-21 LAB
ALBUMIN SERPL-MCNC: 2.9 G/DL (ref 3.4–5)
ALBUMIN/GLOB SERPL: 0.9 {RATIO} (ref 1.1–2.2)
ALP SERPL-CCNC: 57 U/L (ref 40–129)
ALT SERPL-CCNC: <5 U/L (ref 10–40)
ANION GAP SERPL CALCULATED.3IONS-SCNC: 11 MMOL/L (ref 3–16)
AST SERPL-CCNC: 12 U/L (ref 15–37)
BILIRUB SERPL-MCNC: 0.3 MG/DL (ref 0–1)
BUN SERPL-MCNC: 44 MG/DL (ref 7–20)
CALCIUM SERPL-MCNC: 8.7 MG/DL (ref 8.3–10.6)
CHLORIDE SERPL-SCNC: 100 MMOL/L (ref 99–110)
CO2 SERPL-SCNC: 25 MMOL/L (ref 21–32)
CREAT SERPL-MCNC: 4.9 MG/DL (ref 0.9–1.3)
DEPRECATED RDW RBC AUTO: 18.9 % (ref 12.4–15.4)
GFR SERPLBLD CREATININE-BSD FMLA CKD-EPI: 13 ML/MIN/{1.73_M2}
GLUCOSE SERPL-MCNC: 150 MG/DL (ref 70–99)
HCT VFR BLD AUTO: 30 % (ref 40.5–52.5)
HGB BLD-MCNC: 9.6 G/DL (ref 13.5–17.5)
MCH RBC QN AUTO: 31.3 PG (ref 26–34)
MCHC RBC AUTO-ENTMCNC: 31.9 G/DL (ref 31–36)
MCV RBC AUTO: 98 FL (ref 80–100)
PLATELET # BLD AUTO: 232 K/UL (ref 135–450)
PMV BLD AUTO: 8.3 FL (ref 5–10.5)
POTASSIUM SERPL-SCNC: 4.8 MMOL/L (ref 3.5–5.1)
PROT SERPL-MCNC: 6.3 G/DL (ref 6.4–8.2)
RBC # BLD AUTO: 3.06 M/UL (ref 4.2–5.9)
SODIUM SERPL-SCNC: 136 MMOL/L (ref 136–145)
WBC # BLD AUTO: 9.6 K/UL (ref 4–11)

## 2024-02-21 PROCEDURE — A4216 STERILE WATER/SALINE, 10 ML: HCPCS | Performed by: INTERNAL MEDICINE

## 2024-02-21 PROCEDURE — 2580000003 HC RX 258: Performed by: INTERNAL MEDICINE

## 2024-02-21 PROCEDURE — 6360000002 HC RX W HCPCS: Performed by: INTERNAL MEDICINE

## 2024-02-21 PROCEDURE — 6370000000 HC RX 637 (ALT 250 FOR IP): Performed by: INTERNAL MEDICINE

## 2024-02-21 PROCEDURE — 2000000000 HC ICU R&B

## 2024-02-21 PROCEDURE — 5A1D70Z PERFORMANCE OF URINARY FILTRATION, INTERMITTENT, LESS THAN 6 HOURS PER DAY: ICD-10-PCS | Performed by: INTERNAL MEDICINE

## 2024-02-21 PROCEDURE — 90935 HEMODIALYSIS ONE EVALUATION: CPT

## 2024-02-21 PROCEDURE — 99233 SBSQ HOSP IP/OBS HIGH 50: CPT | Performed by: INTERNAL MEDICINE

## 2024-02-21 PROCEDURE — C9113 INJ PANTOPRAZOLE SODIUM, VIA: HCPCS | Performed by: INTERNAL MEDICINE

## 2024-02-21 PROCEDURE — 2500000003 HC RX 250 WO HCPCS: Performed by: INTERNAL MEDICINE

## 2024-02-21 PROCEDURE — 80053 COMPREHEN METABOLIC PANEL: CPT

## 2024-02-21 PROCEDURE — 36592 COLLECT BLOOD FROM PICC: CPT

## 2024-02-21 PROCEDURE — 85027 COMPLETE CBC AUTOMATED: CPT

## 2024-02-21 PROCEDURE — 99223 1ST HOSP IP/OBS HIGH 75: CPT | Performed by: INTERNAL MEDICINE

## 2024-02-21 PROCEDURE — 99233 SBSQ HOSP IP/OBS HIGH 50: CPT | Performed by: PSYCHIATRY & NEUROLOGY

## 2024-02-21 RX ADMIN — MIDODRINE HYDROCHLORIDE 10 MG: 10 TABLET ORAL at 16:50

## 2024-02-21 RX ADMIN — HEPARIN SODIUM 5000 UNITS: 5000 INJECTION INTRAVENOUS; SUBCUTANEOUS at 21:51

## 2024-02-21 RX ADMIN — Medication 10 ML: at 21:52

## 2024-02-21 RX ADMIN — MUPIROCIN: 20 OINTMENT TOPICAL at 09:45

## 2024-02-21 RX ADMIN — Medication: at 15:00

## 2024-02-21 RX ADMIN — MIDODRINE HYDROCHLORIDE 10 MG: 10 TABLET ORAL at 13:26

## 2024-02-21 RX ADMIN — HEPARIN SODIUM 5000 UNITS: 5000 INJECTION INTRAVENOUS; SUBCUTANEOUS at 06:04

## 2024-02-21 RX ADMIN — PANTOPRAZOLE SODIUM 40 MG: 40 INJECTION, POWDER, FOR SOLUTION INTRAVENOUS at 21:51

## 2024-02-21 RX ADMIN — CEFTRIAXONE SODIUM 2000 MG: 2 INJECTION, POWDER, FOR SOLUTION INTRAMUSCULAR; INTRAVENOUS at 06:09

## 2024-02-21 RX ADMIN — SODIUM CHLORIDE 5 MCG/MIN: 9 INJECTION, SOLUTION INTRAVENOUS at 16:49

## 2024-02-21 RX ADMIN — HYDROCORTISONE SODIUM SUCCINATE 100 MG: 100 INJECTION, POWDER, FOR SOLUTION INTRAMUSCULAR; INTRAVENOUS at 16:50

## 2024-02-21 RX ADMIN — DRONEDARONE 400 MG: 400 TABLET, FILM COATED ORAL at 16:59

## 2024-02-21 RX ADMIN — Medication 10 ML: at 09:46

## 2024-02-21 RX ADMIN — DRONEDARONE 400 MG: 400 TABLET, FILM COATED ORAL at 09:39

## 2024-02-21 RX ADMIN — HYDROCORTISONE SODIUM SUCCINATE 100 MG: 100 INJECTION, POWDER, FOR SOLUTION INTRAMUSCULAR; INTRAVENOUS at 00:36

## 2024-02-21 RX ADMIN — HYDROCORTISONE SODIUM SUCCINATE 100 MG: 100 INJECTION, POWDER, FOR SOLUTION INTRAMUSCULAR; INTRAVENOUS at 09:39

## 2024-02-21 RX ADMIN — HEPARIN SODIUM 5000 UNITS: 5000 INJECTION INTRAVENOUS; SUBCUTANEOUS at 16:50

## 2024-02-21 RX ADMIN — MIDODRINE HYDROCHLORIDE 10 MG: 10 TABLET ORAL at 09:39

## 2024-02-21 RX ADMIN — PANTOPRAZOLE SODIUM 40 MG: 40 INJECTION, POWDER, FOR SOLUTION INTRAVENOUS at 09:38

## 2024-02-21 RX ADMIN — Medication: at 21:51

## 2024-02-21 ASSESSMENT — PAIN SCALES - GENERAL: PAINLEVEL_OUTOF10: 0

## 2024-02-21 NOTE — PROGRESS NOTES
Progress Note    Patient Kanwal Horta  MRN: [id number]  YOB: 1965 Age: 58 y.o. Sex: male  Room: [address] [place] [address]       Admitting Physician: Pamella Trevino DO   Date of Admission: 2/19/2024  9:53 AM   Primary Care Physician: Silvia Boswell MD     Subjective:  Kanwal Horta was seen and examined. We are following for hypertension and anemia..  -- Patient remains asymptomatic from a GI perspective.  He denies any nausea or vomiting.  No bloody bowel movements or melena reported by patient or nursing staff.  No emesis or hematemesis reported by patient or nursing staff.    ROS:  Constitutional: Denies fever, no change in appetite  Respiratory: Denies cough or shortness of breath  Cardiovascular: Denies chest pain or edema    Objective:  Vital Signs:   Vitals:    02/21/24 0900   BP: 104/61   Pulse: 61   Resp: 15   Temp:    SpO2: 97%         Physical Exam:  Constitutional: Alert and oriented x 4. No acute distress.   HEENT: Sclera anicteric, mucosal membranes moist  Cardiovascular: Regular rate and rhythm.  No murmurs.  Respiratory: Respirations nonlabored, no crepitus  GI: Abdomen nondistended, soft, and nontender.  Normal active bowel sounds.  No masses palpable.   Rectal: Deferred  Musculoskeletal: pitting edema of the lower legs.  Neurological: alert.    Intake/Output:    Intake/Output Summary (Last 24 hours) at 2/21/2024 1103  Last data filed at 2/21/2024 0941  Gross per 24 hour   Intake 599.3 ml   Output 0 ml   Net 599.3 ml        Current Medications:  Current Facility-Administered Medications   Medication Dose Route Frequency Provider Last Rate Last Admin    cefTRIAXone (ROCEPHIN) 2,000 mg in sodium chloride 0.9 % 50 mL IVPB (mini-bag)  2,000 mg IntraVENous Q24H Danny Jerez DO   Stopped at 02/21/24 0638    prochlorperazine (COMPAZINE) injection 10 mg  10 mg IntraVENous Q6H PRN Pamella Trevino DO        pantoprazole (PROTONIX) 40 mg in sodium chloride (PF) 0.9 % 10 mL injection  40 mg IntraVENous

## 2024-02-21 NOTE — PLAN OF CARE
Problem: Skin/Tissue Integrity  Goal: Absence of new skin breakdown  Description: 1.  Monitor for areas of redness and/or skin breakdown  2.  Assess vascular access sites hourly  3.  Every 4-6 hours minimum:  Change oxygen saturation probe site  4.  Every 4-6 hours:  If on nasal continuous positive airway pressure, respiratory therapy assess nares and determine need for appliance change or resting period.  Outcome: Progressing     Problem: Cardiovascular - Adult  Goal: Maintains optimal cardiac output and hemodynamic stability  Outcome: Progressing     Problem: Metabolic/Fluid and Electrolytes - Adult  Goal: Hemodynamic stability and optimal renal function maintained  Outcome: Progressing

## 2024-02-21 NOTE — PROGRESS NOTES
Shift assessment completed, see flow sheet.   Pt sleeping upon entering.  Dr. Estevez at bedside. Pt A/o  Pt awakens but keeps eyes shut.  Pt denies any pain or SOB.  Denies any tingling in extremities at this time    SpO2 98%. Respirations are easy, even, and unlabored.   Bilateral lung sounds diminished on RA.     VSS  Afib on the monitor  .      PIV, WNL with levophed infusing at 2 mcg/min    All lines and monitoring devices in place.Bed in lowest position with wheels locked. No needs expressed at this time. Will continue to monitor.

## 2024-02-21 NOTE — PROGRESS NOTES
Neurology Progress Note    ID: Kanwal Horta is a 58 y.o. male    : 1965     LOS: 2 days     ASSESSMENT    1.  Transient episode of upper extremity weakness.  2.  End-stage renal disease with hemodialysis.     The patient has no significant focal deficit during my assessment.  CT brain showed no acute ischemic injury.  It also showed questionable cavernous malformation at nicole.  CTA of head and neck showed bilateral vertebral artery stenosis.     Based on the history, the patient has clinical suspicious of TIA probably at brainstem or cervical cord in the context of bilateral vertebral artery stenosis with cavernous malformation.     Per nurse, the patient cannot get MRI in this facility due to Watchman device.    24: The patient has not had any further event with dialysis.  The patient remains to have tingling sensation on the right hand.     Plan:     1.  Recommend dual antiplatelets, aspirin with clopidogrel at least 21 days then clopidogrel alone.  Cavernous malformation has no increased bleeding risk in this situation from antiplatelets.  2.  Continue statin.  3.  PT/OT/ST.  4.  Target blood pressure below 160/100.  5.  Avoid hypotension.    There is nothing else neurology can offer at this time.    Neurology will sign off.    Medications:  Scheduled Meds:    cefTRIAXone (ROCEPHIN) IV  2,000 mg IntraVENous Q24H    pantoprazole (PROTONIX) 40 mg in sodium chloride (PF) 0.9 % 10 mL injection  40 mg IntraVENous Q12H    mupirocin   Each Nostril BID    dronedarone hcl  400 mg Oral BID     heparin (porcine)  5,000 Units SubCUTAneous 3 times per day    balsum peru-castor oil   Topical BID    midodrine  10 mg Oral TID     hydrocortisone sodium succinate PF  100 mg IntraVENous Q8H    sodium chloride flush  5-40 mL IntraVENous 2 times per day     Continuous Infusions:    norepinephrine Stopped (24 0940)    sodium chloride       PRN Meds: prochlorperazine, diphenhydrAMINE, sodium chloride flush,

## 2024-02-21 NOTE — PROGRESS NOTES
[facility]                                 Pulmonary/Critical care  Consult /Progress Note :                                                                  CC :Hypotension     Patient is being seen at the request of Dr FARRIS for a consultation for ICU management     Subjective  Weakness upper ext less   Off levophed  On hydrocortisone stress dose ,had steroids at home   On RA       PHYSICAL EXAM:  Blood pressure (!) 110/55, pulse 58, temperature 97.1 °F (36.2 °C), temperature source Temporal, resp. rate 14, height 1.778 m (5' 10\"), weight 126.3 kg (278 lb 8 oz), SpO2 94 %.' on RA  Gen: No distress.   Eyes: PERRL. No sclera icterus. No conjunctival injection.   ENT: No discharge. Pharynx clear.   Neck: Trachea midline. No obvious mass.    Resp: rhonchi bilateral   GI: Non-tender. Non-distended. No hernia.   Skin: Warm and dry. No nodule on exposed extremities.   Lymph: No cervical LAD. No supraclavicular LAD.   M/S: No cyanosis. No joint deformity. No clubbing.   Neuro: Awake. Alert. Moves all four extremities.   Psych: Oriented x 3. No anxiety.     LABS:  CBC:   Recent Labs     02/19/24  1024 02/20/24  0008 02/20/24  0540 02/21/24  0615   WBC 6.7  --  8.8 9.6   HGB 9.4* 10.4* 10.1* 9.6*   HCT 29.5* 32.5* 31.6* 30.0*   MCV 97.9  --  97.3 98.0     --  214 232       BMP:   Recent Labs     02/19/24  1024 02/20/24  0540 02/21/24  0615    133* 136   K 3.0* 4.5 4.8   CL 94* 94* 100   CO2 28 24 25   BUN 23* 29* 44*   CREATININE 3.2* 3.9* 4.9*       LIVER PROFILE:   Recent Labs     02/19/24  1024 02/20/24  0540 02/21/24  0615   AST 20 18 12*   ALT <5* <5* <5*   BILITOT 0.6 0.7 0.3   ALKPHOS 57 68 57           Microbiology:  Pending       Imaging:  Chest imaging was reviewed by me and showed no acute process    ASSESSMENT:  Shock unclear etiology ,on Levophed ,cardiomyopathy ,last 25 % and MR   Possible pneumonia    A fib   V

## 2024-02-21 NOTE — PROGRESS NOTES
Progress Note    HISTORY     CC:  Shock           We are following for ESRD       Subjective/   HPI:   Off levo.  Remains in the ICU.  BP is better.  AM cortisol < 5.      ROS:  Constitutional:  No fevers, No Chills, + weakness  Cardiovascular:  No palpations, no edema  Respiratory:  No wheezing, no cough  Skin:  No rash, no itching  :  No hematuria, not making much urine     Social Hx:  No Family at the bedside     Past Medical and Surgical History:  - Reviewed, no changes     EXAM       Objective/     Vitals:    02/21/24 0600 02/21/24 0700 02/21/24 0800 02/21/24 0900   BP: (!) 102/59 (!) 110/57 (!) 110/55 104/61   Pulse: 60 60 58 61   Resp: 14 16 14 15   Temp:  97.1 °F (36.2 °C)     TempSrc:  Temporal     SpO2: 99% 98% 94% 97%   Weight: 126.3 kg (278 lb 8 oz)      Height:         24HR INTAKE/OUTPUT:    Intake/Output Summary (Last 24 hours) at 2/21/2024 1109  Last data filed at 2/21/2024 0941  Gross per 24 hour   Intake 599.3 ml   Output 0 ml   Net 599.3 ml     Constitutional:  Ill appearing   Access:  Left arm swelling, left arm AV graft       MEDICAL DECISION MAKING       Data/  Recent Labs     02/19/24  1024 02/20/24  0008 02/20/24  0540 02/21/24  0615   WBC 6.7  --  8.8 9.6   HGB 9.4* 10.4* 10.1* 9.6*   HCT 29.5* 32.5* 31.6* 30.0*   MCV 97.9  --  97.3 98.0     --  214 232     Recent Labs     02/19/24  1024 02/20/24  0540 02/21/24  0615    133* 136   K 3.0* 4.5 4.8   CL 94* 94* 100   CO2 28 24 25   GLUCOSE 86 114* 150*   MG 1.80  --   --    BUN 23* 29* 44*   CREATININE 3.2* 3.9* 4.9*   LABGLOM 21* 17* 13*       Assessment/     ESRD:  On dialysis MWF with a working left upper arm graft which has required frequent interventions over the last year.  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  He does have arm swelling and high venous pressures.  Labs are stable  Anemia of chronic disease:  Hb

## 2024-02-21 NOTE — CARE COORDINATION
Patient  bedhold at [facility]. Verified with Amada.    Per rounds possible pneumonia.   Rocephin 8/10  Not downgrading today

## 2024-02-21 NOTE — CONSULTS
Throat: Lips, mucosa, and tongue normal   Neck: Supple, symmetrical, trachea midline, no adenopathy, thyroid: not enlarged, symmetric, no tenderness/mass/nodules, no carotid bruit or JVD       Lungs:   Clear to auscultation bilaterally, respirations unlabored   Chest Wall:  No tenderness or deformity   Heart:  Regular rate and rhythm, S1, S2 normal, no murmur, rub or gallop   Abdomen:   Soft, non-tender, bowel sounds active all four quadrants,  no masses, no organomegaly           Extremities: Extremities normal, atraumatic, no cyanosis or edema   Pulses: 2+ and symmetric   Skin: Skin color, texture, turgor normal, no rashes or lesions   Pysch: Normal mood and affect   Neurologic: Normal gross motor and sensory exam.         Labs  CBC:   Lab Results   Component Value Date/Time    WBC 9.6 02/21/2024 06:15 AM    RBC 3.06 02/21/2024 06:15 AM    HGB 9.6 02/21/2024 06:15 AM    HCT 30.0 02/21/2024 06:15 AM    MCV 98.0 02/21/2024 06:15 AM    RDW 18.9 02/21/2024 06:15 AM     02/21/2024 06:15 AM     CMP:    Lab Results   Component Value Date/Time     02/21/2024 06:15 AM    K 4.8 02/21/2024 06:15 AM    K 3.0 02/19/2024 10:24 AM     02/21/2024 06:15 AM    CO2 25 02/21/2024 06:15 AM    BUN 44 02/21/2024 06:15 AM    CREATININE 4.9 02/21/2024 06:15 AM    GFRAA 12 07/21/2016 12:25 PM    GFRAA 33 01/07/2013 04:45 AM    AGRATIO 0.9 02/21/2024 06:15 AM    LABGLOM 13 02/21/2024 06:15 AM    GLUCOSE 150 02/21/2024 06:15 AM    PROT 6.3 02/21/2024 06:15 AM    PROT 6.0 01/01/2013 10:25 PM    CALCIUM 8.7 02/21/2024 06:15 AM    BILITOT 0.3 02/21/2024 06:15 AM    ALKPHOS 57 02/21/2024 06:15 AM    AST 12 02/21/2024 06:15 AM    ALT <5 02/21/2024 06:15 AM     PT/INR:  No results found for: \"PTINR\"  Lab Results   Component Value Date    CKTOTAL 33 (L) 01/05/2024    TROPONINI <0.01 11/25/2015       EKG:  I have reviewed EKG with the following interpretation:  Impression:  See HPI    Assessment:  Kanwal Horta is a 58 y.o.  patient who presented to [facility] with c/o arm weakness and numbness. 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 [facility] 8/31/2020 No significant CAD.  Echo 1/7/24 EF=25-30%; severe global HK; RV fcn mod-severely reduced; Mod TR; IVC dilated; mod pericardial effusion (1/3/24 EF=20-25% with WMA).      Recently admitted for MSSA bacteremia and melena and d/c'd on cefazolin 1/24.He returned 2/19/2024 with reported LUE weakness. He c/o left arm going numb and \"dead\" unable to move for couple hours during dialysis Saturday. He had similar complaints Monday with both arms.  He was hypotensive on arrival with SBP 70's following dialysis. Placed on levophed gtt and midodrine. Cortisol low and started IV steroids. Admission Testing:  CXR noted patchy opacities in the RLL; small bilateral effusion; stable cardiomegaly.  Head CT notes possible are of prior hemorrhage involving the nicole.  EKG noted AFIB; left axis deviation; LBBB. Admitting LABS: , K 3.0, 4.8; BUN/Cr 23/3.2, ALT <5, AST 20, H/H 9.4/29.5. Limited ECHO 2/20/24 EF=45 +/- 5%; +WMA; RV systolic fcn mildly reduced; trivial circum pericardial effusion (EF=25-30% in 1/24 and effusion decreased in size now). CTA head/neck negative intracranial aneurysm; mod-severe narrowing bilateral vertebral artery origins.    Diagnosis hypotension and afib in middle-aged male with NICM and multiple medical problems with possible TIA per neuro doc.    Recs:  I reviewed most recent ECHO studies and EF now improved from 20-30% in early January to 45% now.  Known hypotension prior and may be due to adrenal insufficiency based on low cortisol level.  Levophed gtt d/c'd today  Continue midodrine 10 TID for better BP.  Note TSH normal.  IV steroids for low cortisol  He

## 2024-02-21 NOTE — PROGRESS NOTES
TI-RADS 2017 Category 4. Recommend: Ultrasound-guided fine   needle aspiration      ACR TI-RADS 2017 Recommendations:      TR1(0 points) : No FNA or follow up      TR2 (2 points) : No FNA or follow up      TR3 (3 points) : FNA if >/= 2.5 cm, follow up if 1.5 - 2.4 cm in 1, 3, and 5   years      TR4 (4-6 points) : FNA if >/= 1.5 cm, follow up if 1.0 - 1.4 cm in 1, 2, 3,   and 5 years      TR5 (>/= 7 points) : FNA if >/= 1.0 cm, follow up if 0.5 - 0.9 cm every year   for 5 years      *ACR TI-RADS recommends that no more than two nodules with the highest ACR   TI-RADS total point should be biopsied and no more than four nodules should   be followed.         CTA HEAD NECK W CONTRAST   Final Result   1. No evidence of intracranial aneurysm or active extravasation.   2. Moderate to severe narrowing of the bilateral vertebral artery origins.   3. No evidence for arterial occlusion or significant stenosis in the head or   neck.   4. 7 mm right lateral cheek cutaneous nodule.  Correlate with physical   examination.   5. 9 mm left thyroid nodule for which follow-up nonemergent thyroid   ultrasound was previously suggested.         CT HEAD WO CONTRAST   Final Result   1. Stable small area of increased attenuation in the nicole which could   represent a cavernous malformation.  Recent intracranial hemorrhage is   considered less likely.  Elsewhere, there are no acute intracranial findings.   2. Air-fluid level in the right maxillary sinus which could reflect acute   sinusitis.         XR CHEST PORTABLE   Final Result   Central venous catheter is in the mid SVC         CT CHEST WO CONTRAST   Final Result   Mucous plugging in the left lower lobe with associated consolidation and   volume loss, most consistent with atelectasis.  A small amount of patchy   consolidative opacities in left lower lobe may reflect a superimposed   infectious or inflammatory process.      Minimal left pleural fluid.      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  Continue IV vancomycin and ceftriaxone  Patient was given 2 L of fluid with minimal improvement  Echo to rule out cardiac etiology  Cortisol level check for adrenal insufficiency  Trend H&H to rule out hemorrhagic- stable.  Empirically on hydrocortisone  On Levophed  Continue midodrine  Taper and discontinue Levophed  Vancomycin discontinue     Roque UE neuro symptoms  Last known well> 48 hours ago  CT head reviewed   MRI brain ordered-cannot get MRI because of presence of Watchman    History of adrenal insufficiency  Currently on IV hydrocortisone    Atrial fibrillation  Continue Multaq  Unclear why he is not on anticoagulation     9 mm left thyroid nodule:  Thyroid ultrasound ordered     Anemia secondary to end-stage renal disease  Stage      Hypokalemia: One-time dose given  Hypomagnesemia: One-time given     End-stage renal disease: Nephrology consulted for HD         Due to the above diagnosis makes the patient higher risk for morbidity and mortality requiring testing and treatment      Diet: gen   DVT Prophylaxis: held  Full code     Dc planning once off levophed       JAYE VELEZ MD

## 2024-02-21 NOTE — ACP (ADVANCE CARE PLANNING)
Advance Care Planning     General Advance Care Planning (ACP) Conversation    Date of Conversation: 2/21/2024  Conducted with: Patient with Decision Making Capacity    Patient did paperwork for POA with . The patient's sister Lacy Chacon is POA.    Healthcare Decision Maker:  No healthcare decision makers have been documented.  Click here to complete HealthCare Decision Makers including selection of the Healthcare Decision Maker Relationship (ie \"Primary\")   Today we  completed but not scanned into EPIC.    Content/Action Overview:  Has ACP document(s) on file - reflects the patient's care preferences  Reviewed DNR/DNI and patient elects Full Code (Attempt Resuscitation)        Length of Voluntary ACP Conversation in minutes:  <16 minutes (Non-Billable)    Teresa Boeck, RN

## 2024-02-21 NOTE — SIGNIFICANT EVENT
Cortisol came back at <5 consistent with adrenal insufficiency. TSH normal. Continue hydrocortisone

## 2024-02-21 NOTE — CARE COORDINATION
02/21/24 1218   Service Assessment   Patient Orientation Alert and Oriented;Person;Place;Situation   Cognition Alert   History Provided By Patient   Primary Caregiver Other (Comment)  ([facility] - does not walk)   Support Systems Family Members;Other (Comment)  ([facility])   Patient's Healthcare Decision Maker is: Named in Scanned ACP Document  (Document is new - not scanned yet. Lacy Oswaldo)   PCP Verified by CM Yes   Last Visit to PCP Within last 3 months   Prior Functional Level Assistance with the following:;Bathing;Dressing;Toileting;Cooking;Housework;Shopping;Mobility  (PT wears a brief. Can dress shirt/top but not pants.)   Current Functional Level Assistance with the following:;Bathing;Dressing;Toileting;Cooking;Housework;Shopping;Mobility  (PT wears a brief. Can dress shirt/top but not pants.)   Can patient return to prior living arrangement Yes   Ability to make needs known: Good   Family able to assist with home care needs: Other (comment)  ([facility])   Would you like for me to discuss the discharge plan with any other family members/significant others, and if so, who? No   Financial Resources Medicare;Medicaid   Community Resources None   Discharge Planning   Type of Residence Long-Term Care   Living Arrangements Other (Comment)   Current Services Prior To Admission Durable Medical Equipment;Extended Care Facility   Current DME Prior to Arrival Wheelchair   Potential Assistance Needed Extended Care Facility   DME Ordered? No   Potential Assistance Purchasing Medications No   Type of Home Care Services None   Patient expects to be discharged to: Long-term care   Follow Up Appointment: Best Day/Time    (LT)   One/Two Story Residence One story   History of falls? 0     Case Management Assessment  Initial Evaluation    Date/Time of Evaluation: 2/21/2024 12:24 PM  Assessment Completed by: Teresa Boeck, RN    If patient is discharged prior to next notation, then this note serves as note for discharge by case  management.    Patient Name: Kanwal Horta                   YOB: 1965  Diagnosis: Hypokalemia [E87.6]  Hypotension [I95.9]  Hypotension, unspecified hypotension type [I95.9]  Pneumonia due to infectious organism, unspecified laterality, unspecified part of lung [J18.9]  Stage 5 chronic kidney disease on chronic dialysis (HCC) [N18.6, Z99.2]                   Date / Time: 2/19/2024  9:53 AM    Patient Admission Status: Inpatient   Readmission Risk (Low < 19, Mod (19-27), High > 27): Readmission Risk Score: 22.7    Current PCP: Silvia Boswell MD  PCP verified by CM? (P) Yes    Chart Reviewed: {YES / NO:19727}      History Provided by: (P) Patient  Patient Orientation: (P) Alert and Oriented, Person, Place, Situation    Patient Cognition: (P) Alert    Hospitalization in the last 30 days (Readmission):  {YES / NO:19726}    If yes, Readmission Assessment in  Navigator will be completed.    Advance Directives:      Code Status: Full Code   Patient's Primary Decision Maker is: (P) Named in Scanned ACP Document (Document is new - not scanned yet. Lacy Chacon)      Discharge Planning:    Patient lives with: (P) Other (Comment) Type of Home: (P) Long-Term Care  Primary Care Giver: (P) Other (Comment) ([facility] - does not walk)  Patient Support Systems include: (P) Family Members, Other (Comment) ([facility])   Current Financial resources: (P) Medicare, Medicaid  Current community resources: (P) None  Current services prior to admission: (P) Durable Medical Equipment, Extended Care Facility            Current DME: (P) Wheelchair            Type of Home Care services:  (P) None    ADLS  Prior functional level: (P) Assistance with the following:, Bathing, Dressing, Toileting, Cooking, Housework, Shopping, Mobility (PT wears a brief. Can dress shirt/top but not pants.)  Current functional level: (P) Assistance with the following:, Bathing, Dressing, Toileting, Cooking, Housework, Shopping, Mobility (PT wears a

## 2024-02-21 NOTE — PROGRESS NOTES
Treatment time: 2.5 hours  Net UF: 1000 ml     Pre weight: 126.3 kg  Post weight:125.3 kg  EDW: TBD  Crit Line Used: yes Ending Profile: A  Refill Present: yes    Access used: LAVF    Access function: well with  ml/min     Medications or blood products given: none     Regular outpatient schedule: MWF     Summary of response to treatment: Patient tolerated treatment well and without any complications. Patient remained stable throughout entire treatment      Report given to Ladarius James RN and copy of dialysis treatment record placed in chart, to be scanned into EMR.

## 2024-02-22 LAB
ALBUMIN SERPL-MCNC: 3 G/DL (ref 3.4–5)
ALBUMIN/GLOB SERPL: 0.9 {RATIO} (ref 1.1–2.2)
ALP SERPL-CCNC: 56 U/L (ref 40–129)
ALT SERPL-CCNC: <5 U/L (ref 10–40)
ANION GAP SERPL CALCULATED.3IONS-SCNC: 12 MMOL/L (ref 3–16)
AST SERPL-CCNC: 11 U/L (ref 15–37)
BILIRUB SERPL-MCNC: 0.3 MG/DL (ref 0–1)
BUN SERPL-MCNC: 32 MG/DL (ref 7–20)
CALCIUM SERPL-MCNC: 8.5 MG/DL (ref 8.3–10.6)
CHLORIDE SERPL-SCNC: 98 MMOL/L (ref 99–110)
CO2 SERPL-SCNC: 27 MMOL/L (ref 21–32)
CREAT SERPL-MCNC: 4 MG/DL (ref 0.9–1.3)
DEPRECATED RDW RBC AUTO: 19.3 % (ref 12.4–15.4)
GFR SERPLBLD CREATININE-BSD FMLA CKD-EPI: 16 ML/MIN/{1.73_M2}
GLUCOSE SERPL-MCNC: 143 MG/DL (ref 70–99)
HCT VFR BLD AUTO: 31.5 % (ref 40.5–52.5)
HGB BLD-MCNC: 10 G/DL (ref 13.5–17.5)
MCH RBC QN AUTO: 30.9 PG (ref 26–34)
MCHC RBC AUTO-ENTMCNC: 31.9 G/DL (ref 31–36)
MCV RBC AUTO: 96.9 FL (ref 80–100)
ORGANISM: ABNORMAL
PLATELET # BLD AUTO: 258 K/UL (ref 135–450)
PMV BLD AUTO: 8.2 FL (ref 5–10.5)
POTASSIUM SERPL-SCNC: 3.5 MMOL/L (ref 3.5–5.1)
PROT SERPL-MCNC: 6.5 G/DL (ref 6.4–8.2)
RBC # BLD AUTO: 3.25 M/UL (ref 4.2–5.9)
REPORT: NORMAL
RESP PATH DNA+RNA PNL L RESP NAA+NON-PRB: ABNORMAL
SODIUM SERPL-SCNC: 137 MMOL/L (ref 136–145)
WBC # BLD AUTO: 9.7 K/UL (ref 4–11)

## 2024-02-22 PROCEDURE — 6370000000 HC RX 637 (ALT 250 FOR IP): Performed by: INTERNAL MEDICINE

## 2024-02-22 PROCEDURE — C9113 INJ PANTOPRAZOLE SODIUM, VIA: HCPCS | Performed by: INTERNAL MEDICINE

## 2024-02-22 PROCEDURE — 6360000002 HC RX W HCPCS: Performed by: INTERNAL MEDICINE

## 2024-02-22 PROCEDURE — 85027 COMPLETE CBC AUTOMATED: CPT

## 2024-02-22 PROCEDURE — 99233 SBSQ HOSP IP/OBS HIGH 50: CPT | Performed by: INTERNAL MEDICINE

## 2024-02-22 PROCEDURE — 36592 COLLECT BLOOD FROM PICC: CPT

## 2024-02-22 PROCEDURE — 87633 RESP VIRUS 12-25 TARGETS: CPT

## 2024-02-22 PROCEDURE — 2580000003 HC RX 258: Performed by: INTERNAL MEDICINE

## 2024-02-22 PROCEDURE — 2000000000 HC ICU R&B

## 2024-02-22 PROCEDURE — A4216 STERILE WATER/SALINE, 10 ML: HCPCS | Performed by: INTERNAL MEDICINE

## 2024-02-22 PROCEDURE — 80053 COMPREHEN METABOLIC PANEL: CPT

## 2024-02-22 RX ORDER — SEVELAMER CARBONATE 800 MG/1
3200 TABLET, FILM COATED ORAL
Status: DISCONTINUED | OUTPATIENT
Start: 2024-02-22 | End: 2024-02-26 | Stop reason: HOSPADM

## 2024-02-22 RX ORDER — SEVELAMER CARBONATE 800 MG/1
1600 TABLET, FILM COATED ORAL 2 TIMES DAILY PRN
Status: DISCONTINUED | OUTPATIENT
Start: 2024-02-22 | End: 2024-02-26 | Stop reason: HOSPADM

## 2024-02-22 RX ORDER — ASPIRIN 81 MG/1
81 TABLET ORAL DAILY
Status: DISCONTINUED | OUTPATIENT
Start: 2024-02-22 | End: 2024-02-26 | Stop reason: HOSPADM

## 2024-02-22 RX ORDER — TRAZODONE HYDROCHLORIDE 50 MG/1
50 TABLET ORAL NIGHTLY
Status: DISCONTINUED | OUTPATIENT
Start: 2024-02-22 | End: 2024-02-26 | Stop reason: HOSPADM

## 2024-02-22 RX ADMIN — PANTOPRAZOLE SODIUM 40 MG: 40 INJECTION, POWDER, FOR SOLUTION INTRAVENOUS at 07:57

## 2024-02-22 RX ADMIN — MUPIROCIN: 20 OINTMENT TOPICAL at 20:13

## 2024-02-22 RX ADMIN — Medication 10 ML: at 21:55

## 2024-02-22 RX ADMIN — HYDROCORTISONE SODIUM SUCCINATE 100 MG: 100 INJECTION, POWDER, FOR SOLUTION INTRAMUSCULAR; INTRAVENOUS at 02:47

## 2024-02-22 RX ADMIN — MIDODRINE HYDROCHLORIDE 10 MG: 10 TABLET ORAL at 07:57

## 2024-02-22 RX ADMIN — DRONEDARONE 400 MG: 400 TABLET, FILM COATED ORAL at 07:57

## 2024-02-22 RX ADMIN — ASPIRIN 81 MG: 81 TABLET, COATED ORAL at 11:45

## 2024-02-22 RX ADMIN — HEPARIN SODIUM 5000 UNITS: 5000 INJECTION INTRAVENOUS; SUBCUTANEOUS at 21:47

## 2024-02-22 RX ADMIN — Medication 10 ML: at 07:58

## 2024-02-22 RX ADMIN — DRONEDARONE 400 MG: 400 TABLET, FILM COATED ORAL at 20:11

## 2024-02-22 RX ADMIN — HEPARIN SODIUM 5000 UNITS: 5000 INJECTION INTRAVENOUS; SUBCUTANEOUS at 06:31

## 2024-02-22 RX ADMIN — TRAZODONE HYDROCHLORIDE 50 MG: 50 TABLET ORAL at 21:48

## 2024-02-22 RX ADMIN — SEVELAMER CARBONATE 3200 MG: 800 TABLET, FILM COATED ORAL at 16:47

## 2024-02-22 RX ADMIN — HYDROCORTISONE SODIUM SUCCINATE 50 MG: 100 INJECTION, POWDER, FOR SOLUTION INTRAMUSCULAR; INTRAVENOUS at 20:10

## 2024-02-22 RX ADMIN — MIDODRINE HYDROCHLORIDE 10 MG: 10 TABLET ORAL at 11:45

## 2024-02-22 RX ADMIN — MUPIROCIN: 20 OINTMENT TOPICAL at 07:57

## 2024-02-22 RX ADMIN — HEPARIN SODIUM 5000 UNITS: 5000 INJECTION INTRAVENOUS; SUBCUTANEOUS at 16:50

## 2024-02-22 RX ADMIN — CEFTRIAXONE SODIUM 2000 MG: 2 INJECTION, POWDER, FOR SOLUTION INTRAMUSCULAR; INTRAVENOUS at 06:35

## 2024-02-22 RX ADMIN — HYDROCORTISONE SODIUM SUCCINATE 100 MG: 100 INJECTION, POWDER, FOR SOLUTION INTRAMUSCULAR; INTRAVENOUS at 07:57

## 2024-02-22 RX ADMIN — PANTOPRAZOLE SODIUM 40 MG: 40 INJECTION, POWDER, FOR SOLUTION INTRAVENOUS at 20:11

## 2024-02-22 RX ADMIN — MIDODRINE HYDROCHLORIDE 10 MG: 10 TABLET ORAL at 16:47

## 2024-02-22 NOTE — PROGRESS NOTES
Shift assessment completed, see flow sheet.   Pt is awake A/O lying in bed playing on cell.  Pt denies any pain or SOB.  No N/V or ABD tenderness.         SpO2 100%. Respirations are easy, even, and unlabored.   Bilateral lung sounds diminished on RA.     VSS  Afib on the monitor    CVC/PIV, WNL with levophed infusing and decreased to 1 mcg/min    All lines and monitoring devices in place. Bed in lowest position with wheels locked. No needs expressed at this time. Will continue to monitor.

## 2024-02-22 NOTE — PLAN OF CARE
Problem: Discharge Planning  Goal: Discharge to home or other facility with appropriate resources  Outcome: Progressing     Problem: Skin/Tissue Integrity  Goal: Absence of new skin breakdown  Description: 1.  Monitor for areas of redness and/or skin breakdown  2.  Assess vascular access sites hourly  3.  Every 4-6 hours minimum:  Change oxygen saturation probe site  4.  Every 4-6 hours:  If on nasal continuous positive airway pressure, respiratory therapy assess nares and determine need for appliance change or resting period.  Outcome: Progressing     Problem: Cardiovascular - Adult  Goal: Maintains optimal cardiac output and hemodynamic stability  Outcome: Progressing

## 2024-02-22 NOTE — PROGRESS NOTES
Message left w/ Dr. Jacobs office to call back to see if okay to start ASA and plavix.  Will await call back

## 2024-02-22 NOTE — PROGRESS NOTES
Admit: 2024    Name:  Kanwal Horta  Room:  [address] [place] [address]  MRN:    [id number]    Critical Care Daily Progress Note for 2024   Patient with end-stage renal disease admitted with hypotension, paresthesias and weakness of both upper extremities  He states that he usually gets hypotensive during dialysis received midodrine.  This time the hypotension persisted.    Interval History:     Continues to be on Levophed  C/o insomnia      Scheduled Meds:   cefTRIAXone (ROCEPHIN) IV  2,000 mg IntraVENous Q24H    pantoprazole (PROTONIX) 40 mg in sodium chloride (PF) 0.9 % 10 mL injection  40 mg IntraVENous Q12H    mupirocin   Each Nostril BID    dronedarone hcl  400 mg Oral BID WC    heparin (porcine)  5,000 Units SubCUTAneous 3 times per day    balsum peru-castor oil   Topical BID    midodrine  10 mg Oral TID WC    hydrocortisone sodium succinate PF  100 mg IntraVENous Q8H    sodium chloride flush  5-40 mL IntraVENous 2 times per day       Continuous Infusions:   norepinephrine 1 mcg/min (24 0757)    sodium chloride         PRN Meds:  prochlorperazine, diphenhydrAMINE, sodium chloride flush, sodium chloride, polyethylene glycol, acetaminophen **OR** acetaminophen                  Objective:     Temp  Av.5 °F (36.4 °C)  Min: 97.1 °F (36.2 °C)  Max: 97.9 °F (36.6 °C)  Pulse  Av.9  Min: 51  Max: 81  BP  Min: 65/51  Max: 129/40  SpO2  Av.5 %  Min: 84 %  Max: 100 %  Patient Vitals for the past 4 hrs:   BP Pulse Resp SpO2   24 0700 (!) 122/56 63 23 98 %   24 0635 (!) 112/56 61 17 --   24 0600 116/60 61 19 99 %           Intake/Output Summary (Last 24 hours) at 2024 0812  Last data filed at 2024 0757  Gross per 24 hour   Intake 1132.37 ml   Output 1500 ml   Net -367.63 ml         Physical Exam:    General appearance:  mild acute distress, appears older than stated age  HEENT:   atraumatic, sclera anicteric, Conjunctivae clear.  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  Continue IV vancomycin and ceftriaxone  Patient was given 2 L of fluid with minimal improvement  Echo to rule out cardiac etiology  Cortisol level check for adrenal insufficiency  Trend H&H to rule out hemorrhagic- stable.  On hydrocortisone--> being tapered   On Levophed  Continue midodrine  Taper and discontinue Levophed  Vancomycin discontinued  Goal MAP 65      Roque UE neuro symptoms  Last known well> 48 hours ago  CT head reviewed   MRI brain ordered-cannot get MRI because of presence of Watchman  No neuro symptoms currently     History of adrenal insufficiency  Currently on IV hydrocortisone    Atrial fibrillation  Continue Multaq  Unclear why he is not on anticoagulation     9 mm left thyroid nodule:  Thyroid ultrasound ordered     Anemia secondary to end-stage renal disease  Stage      Insomnia   Trazodone 50 mg nightly      End-stage renal disease: Nephrology consulted for HD       Due to the above diagnosis makes the patient higher risk for morbidity and mortality requiring testing and treatment      Diet: gen   DVT Prophylaxis: held  Full code     Dc planning once off levophed       JAYE VELEZ MD

## 2024-02-22 NOTE — PROGRESS NOTES
[facility]                                 Pulmonary/Critical care  Consult /Progress Note :                                                                  CC :Hypotension     Patient is being seen at the request of Dr FARRIS for a consultation for ICU management     Subjective  HD HD   Ob and off levophed  Weakness upper ext less   Off levophed  On hydrocortisone stress dose ,had steroids at home   On RA       PHYSICAL EXAM:  Blood pressure 105/70, pulse 64, temperature 98 °F (36.7 °C), temperature source Temporal, resp. rate 26, height 1.778 m (5' 10\"), weight 125.3 kg (276 lb 3.8 oz), SpO2 100 %.' on RA  Gen: No distress.   Eyes: PERRL. No sclera icterus. No conjunctival injection.   ENT: No discharge. Pharynx clear.   Neck: Trachea midline. No obvious mass.    Resp: rhonchi bilateral   GI: Non-tender. Non-distended. No hernia.   Skin: Warm and dry. No nodule on exposed extremities.   Lymph: No cervical LAD. No supraclavicular LAD.   M/S: No cyanosis. No joint deformity. No clubbing.   Neuro: Awake. Alert. Moves all four extremities.   Psych: Oriented x 3. No anxiety.     LABS:  CBC:   Recent Labs     02/20/24  0540 02/21/24  0615 02/22/24  0642   WBC 8.8 9.6 9.7   HGB 10.1* 9.6* 10.0*   HCT 31.6* 30.0* 31.5*   MCV 97.3 98.0 96.9    232 258       BMP:   Recent Labs     02/20/24  0540 02/21/24  0615 02/22/24  0642   * 136 137   K 4.5 4.8 3.5   CL 94* 100 98*   CO2 24 25 27   BUN 29* 44* 32*   CREATININE 3.9* 4.9* 4.0*       LIVER PROFILE:   Recent Labs     02/20/24  0540 02/21/24  0615 02/22/24  0642   AST 18 12* 11*   ALT <5* <5* <5*   BILITOT 0.7 0.3 0.3   ALKPHOS 68 57 56           Microbiology:  Pending       Imaging:  Chest imaging was reviewed by me and showed no acute process    ASSESSMENT:  Shock unclear etiology ,on Levophed ,cardiomyopathy ,last 25 % and MR   Possible pneumonia    A fib   V tach  History  WPW s/p ablation   NICM  ESRD on HD  S/P ICD   Possible adrenal insuffiencey  Possible OWEN  Possible TIA     PLAN:  On Levophed on and off   On Multaq  Keep rocephin for now ,pending full culture   HD per renal   On hydrocortisone ,will start weaning  Keep sat above 95  Adrenal work up ,on hydrocortisone   Watch BP as has low BP this am before   Off   Will start ASA and get clearance from GI for plavix as per neurology recommendation  Sputum culture negative   Stop vanomcyin ,  Procal   Keep rocephin Day 3 #5  S/p Watchman Procedure  Neurology/cardiology following appreciate input  Work up for TiA as per Neurology  May need sleep study as OP  DVT px   Keep ICU

## 2024-02-22 NOTE — PROGRESS NOTES
Reassessment completed see flowsheet    Pt does not wish to reposition and venelex not given yet.  Pt does not want to turn until has BM and then apply medication.  Pt educated on risk of skin breakdown.

## 2024-02-22 NOTE — PROGRESS NOTES
4 Eyes Skin Assessment     The patient is being assess for   Shift Handoff    I agree that 2 RN's have performed a thorough Head to Toe Skin Assessment on the patient. ALL assessment sites listed below have been assessed.      Areas assessed by both nurses:   [x]   Head, Face, and Ears   [x]   Shoulders, Back, and Chest, Abdomen  [x]   Arms, Elbows, and Hands   [x]   Coccyx, Sacrum, and Ischium  [x]   Legs, Feet, and Heels        Scattered bruising, redness to pannus, dti to buttocks    Co-signer eSignature: {Esignature:[id number]}    Does the Patient have Skin Breakdown?  Yes LDA WOUND CARE was Initiated documentation include the Yara-wound, Wound Assessment, Measurements, Dressing Treatment, Drainage, and Color\",          Archie Prevention initiated:  Yes   Wound Care Orders initiated:  Yes      WO nurse consulted for Pressure Injury (Stage 3,4, Unstageable, DTI, NWPT, Complex wounds)and New or Established Ostomies:  Yes      Primary Nurse eSignature: Electronically signed by Bina Gray RN on 2/22/24 at 6:41 AM EST

## 2024-02-22 NOTE — PROGRESS NOTES
pressures.  Labs are stable  Anemia of chronic disease:  Hb at target of 10 - 11, due to ESRD and on CORWIN  Shock:  Adrenal insufficiency +/- septic picture.  On pressors and cultures are pending   Bilateral UE weakness/paresthesia - CTA negative for stroke.  ? Due to hypotension.  Does have a history of atrial fibrillation   Atrial Fibrillation:  Rate controlled.  Not on anticoagulation     Plan/     Continue Midodrine   Solu-cortef   HD tomorrow and will see if BP tolerates    Thank you for asking us to participate in the management of your patient, please do not hesitate to contact me for any concerns regarding my recommendations as outlined above.    -----------------------------  Ruby Reynolds M.D.   [facility]

## 2024-02-22 NOTE — PROGRESS NOTES
Reassessment completed.  See flowsheet    Pt still refuses to turn or allow RN to place venelex on DTI.  Educated to pt risk of skin injury.  Pt wants to wait until has BM pt has not had this shift.  Levophed remains off and pt BP remains stable

## 2024-02-22 NOTE — PROGRESS NOTES
02/22/2024  MEETS for Device with Indicators: #1      [x] Central Lines Triple Lumen CatheterCentral venous    CVC Triple Lumen 02/19/24 Right Internal jugular      [x]Hemodialysis    []Other: Please Specify     []Limited Access: Inadequate peripheral venous access    []Other: Please Specify       3. [] Long Term IV Antibiotic Therapy 14 days or longer:    []Cephalosporins    []Fluoroquinolones    []Penicillin    []Glycopeptides    []Nitro imidazoles    []Oxazolidinedione    []Carbapenems    []Other: Please Specify       4.  [] CVP monitoring, Pulmonary artery catheterization   []Transvenous Pacing requires central venous access     []Other: Please Specify     5.  [] Fluid resuscitation (including blood products)?     []  _ Units of Pack Red Blood Cells     []  _ Units of Fresh Frozen Plasma     []  _ Units of Platelets      []  _ Units of Cryoprecipitate      []Other: Please Specify     6. [] Extracorporeal Therapies    []Continuous Renal Replacement Therapy (CRRT)       []Other: Please Specify     7. [] Administration of Vasopressors for Hemodynamically Unstable:?    []Levophed (Norepinephrine)     []Matthieu-synephrine (Phenylephrine)     []Vasopressin (Pitressin)     []Epinephrine     []Dopamine (Intropin)     []Dobutamine     []Nitroglycerin      []Isuprel (Isoproterenol)     []Cardene (Nicardipine)     []Cardizem (Diltiazem)     []Amiodarone (Cordarone)     []Adenosine     []Other: Please Specify     8. [] IV Agents that are Vesicants / Irritant:?     []Replacements: Calcium Chloride, Calcium Gluconate, Potassium Chloride, Sodium Bicarbonate, High Concentration of Dextrose, TPN    []Antibiotics: Vancomycin, Levaquin and Gentamycin (if long term therapy), Antiviral; Acyclovir    []IV Medications: Phenergan, Phenytoin, Levophed, Vasopressin, and Chemotherapy    []Other: Please Specify     9.  [] Nutritional Support?/Hypertonic Solutions     []Total Parental Nutrition [TPN]?     []3% Saline     [] Sodium

## 2024-02-22 NOTE — PROGRESS NOTES
Pt has not slept all night. Playing games on phone. Refuses turning and repositioning unless he has a BM and wats cleaned up. 1 BM this shift, was able to use CHG at that time, but otherwise refused bath. Attempted to titrate levo down this shift, but hard to get accurate BP's on him when he won't straighten arm when he feels cuff go off. He always has arm bent playing on phone. Educated pt with no success.

## 2024-02-22 NOTE — PROGRESS NOTES
FYI:  Additive QT interval prolongation may occur with Trazodone and Dronedarone. Arrhythmias occur most commonly when QTC >500.  Patient's QTC is 534.  Pankaj Calloway R.Ph.2/22/202412:44 PM

## 2024-02-23 LAB
ALBUMIN SERPL-MCNC: 2.7 G/DL (ref 3.4–5)
ALBUMIN/GLOB SERPL: 0.8 {RATIO} (ref 1.1–2.2)
ALP SERPL-CCNC: 51 U/L (ref 40–129)
ALT SERPL-CCNC: <5 U/L (ref 10–40)
ANION GAP SERPL CALCULATED.3IONS-SCNC: 11 MMOL/L (ref 3–16)
AST SERPL-CCNC: 9 U/L (ref 15–37)
BILIRUB SERPL-MCNC: <0.2 MG/DL (ref 0–1)
BUN SERPL-MCNC: 45 MG/DL (ref 7–20)
CALCIUM SERPL-MCNC: 7.9 MG/DL (ref 8.3–10.6)
CHLORIDE SERPL-SCNC: 98 MMOL/L (ref 99–110)
CO2 SERPL-SCNC: 27 MMOL/L (ref 21–32)
CREAT SERPL-MCNC: 5.1 MG/DL (ref 0.9–1.3)
DEPRECATED RDW RBC AUTO: 19.6 % (ref 12.4–15.4)
GFR SERPLBLD CREATININE-BSD FMLA CKD-EPI: 12 ML/MIN/{1.73_M2}
GLUCOSE SERPL-MCNC: 142 MG/DL (ref 70–99)
HCT VFR BLD AUTO: 30.4 % (ref 40.5–52.5)
HGB BLD-MCNC: 9.5 G/DL (ref 13.5–17.5)
MCH RBC QN AUTO: 31 PG (ref 26–34)
MCHC RBC AUTO-ENTMCNC: 31.3 G/DL (ref 31–36)
MCV RBC AUTO: 99.1 FL (ref 80–100)
PLATELET # BLD AUTO: 169 K/UL (ref 135–450)
PMV BLD AUTO: 8 FL (ref 5–10.5)
POTASSIUM SERPL-SCNC: 3.7 MMOL/L (ref 3.5–5.1)
PROT SERPL-MCNC: 6.2 G/DL (ref 6.4–8.2)
RBC # BLD AUTO: 3.07 M/UL (ref 4.2–5.9)
SODIUM SERPL-SCNC: 136 MMOL/L (ref 136–145)
WBC # BLD AUTO: 6 K/UL (ref 4–11)

## 2024-02-23 PROCEDURE — 6360000002 HC RX W HCPCS: Performed by: INTERNAL MEDICINE

## 2024-02-23 PROCEDURE — A4216 STERILE WATER/SALINE, 10 ML: HCPCS | Performed by: INTERNAL MEDICINE

## 2024-02-23 PROCEDURE — C9113 INJ PANTOPRAZOLE SODIUM, VIA: HCPCS | Performed by: INTERNAL MEDICINE

## 2024-02-23 PROCEDURE — 80053 COMPREHEN METABOLIC PANEL: CPT

## 2024-02-23 PROCEDURE — 2000000000 HC ICU R&B

## 2024-02-23 PROCEDURE — 2500000003 HC RX 250 WO HCPCS: Performed by: INTERNAL MEDICINE

## 2024-02-23 PROCEDURE — 6370000000 HC RX 637 (ALT 250 FOR IP): Performed by: INTERNAL MEDICINE

## 2024-02-23 PROCEDURE — 2580000003 HC RX 258: Performed by: INTERNAL MEDICINE

## 2024-02-23 PROCEDURE — 99233 SBSQ HOSP IP/OBS HIGH 50: CPT | Performed by: INTERNAL MEDICINE

## 2024-02-23 PROCEDURE — 85027 COMPLETE CBC AUTOMATED: CPT

## 2024-02-23 PROCEDURE — 99232 SBSQ HOSP IP/OBS MODERATE 35: CPT | Performed by: INTERNAL MEDICINE

## 2024-02-23 PROCEDURE — 90935 HEMODIALYSIS ONE EVALUATION: CPT

## 2024-02-23 RX ADMIN — MIDODRINE HYDROCHLORIDE 10 MG: 10 TABLET ORAL at 16:38

## 2024-02-23 RX ADMIN — DRONEDARONE 400 MG: 400 TABLET, FILM COATED ORAL at 08:24

## 2024-02-23 RX ADMIN — HEPARIN SODIUM 5000 UNITS: 5000 INJECTION INTRAVENOUS; SUBCUTANEOUS at 05:33

## 2024-02-23 RX ADMIN — LIDOCAINE HYDROCHLORIDE 1 ML: 10 INJECTION, SOLUTION INFILTRATION; PERINEURAL at 12:52

## 2024-02-23 RX ADMIN — SODIUM CHLORIDE 5 ML/HR: 9 INJECTION, SOLUTION INTRAVENOUS at 05:58

## 2024-02-23 RX ADMIN — MIDODRINE HYDROCHLORIDE 10 MG: 10 TABLET ORAL at 11:46

## 2024-02-23 RX ADMIN — SEVELAMER CARBONATE 3200 MG: 800 TABLET, FILM COATED ORAL at 11:46

## 2024-02-23 RX ADMIN — MUPIROCIN: 20 OINTMENT TOPICAL at 08:22

## 2024-02-23 RX ADMIN — Medication: at 08:22

## 2024-02-23 RX ADMIN — HYDROCORTISONE SODIUM SUCCINATE 50 MG: 100 INJECTION, POWDER, FOR SOLUTION INTRAMUSCULAR; INTRAVENOUS at 08:23

## 2024-02-23 RX ADMIN — DRONEDARONE 400 MG: 400 TABLET, FILM COATED ORAL at 16:54

## 2024-02-23 RX ADMIN — HYDROCORTISONE SODIUM SUCCINATE 50 MG: 100 INJECTION, POWDER, FOR SOLUTION INTRAMUSCULAR; INTRAVENOUS at 16:39

## 2024-02-23 RX ADMIN — HYDROCORTISONE SODIUM SUCCINATE 50 MG: 100 INJECTION, POWDER, FOR SOLUTION INTRAMUSCULAR; INTRAVENOUS at 05:33

## 2024-02-23 RX ADMIN — PANTOPRAZOLE SODIUM 40 MG: 40 INJECTION, POWDER, FOR SOLUTION INTRAVENOUS at 08:22

## 2024-02-23 RX ADMIN — CEFTRIAXONE SODIUM 2000 MG: 2 INJECTION, POWDER, FOR SOLUTION INTRAMUSCULAR; INTRAVENOUS at 05:59

## 2024-02-23 RX ADMIN — MUPIROCIN: 20 OINTMENT TOPICAL at 23:16

## 2024-02-23 RX ADMIN — SEVELAMER CARBONATE 3200 MG: 800 TABLET, FILM COATED ORAL at 16:39

## 2024-02-23 RX ADMIN — HEPARIN SODIUM 5000 UNITS: 5000 INJECTION INTRAVENOUS; SUBCUTANEOUS at 23:10

## 2024-02-23 RX ADMIN — PANTOPRAZOLE SODIUM 40 MG: 40 INJECTION, POWDER, FOR SOLUTION INTRAVENOUS at 23:11

## 2024-02-23 RX ADMIN — ASPIRIN 81 MG: 81 TABLET, COATED ORAL at 08:22

## 2024-02-23 RX ADMIN — HYDROCORTISONE SODIUM SUCCINATE 50 MG: 100 INJECTION, POWDER, FOR SOLUTION INTRAMUSCULAR; INTRAVENOUS at 23:11

## 2024-02-23 ASSESSMENT — PAIN SCALES - GENERAL: PAINLEVEL_OUTOF10: 0

## 2024-02-23 NOTE — PROGRESS NOTES
Admit: 2024    Name:  Kanwal Horta  Room:  [address] [place] [address]  MRN:    [id number]    Critical Care Daily Progress Note for 2024   Patient with end-stage renal disease admitted with hypotension, paresthesias and weakness of both upper extremities  He states that he usually gets hypotensive during dialysis received midodrine.  This time the hypotension persisted.    Interval History:     Now off levophed       Scheduled Meds:   hydrocortisone sodium succinate PF  50 mg IntraVENous Q8H    aspirin  81 mg Oral Daily    sevelamer  3,200 mg Oral TID WC    traZODone  50 mg Oral Nightly    cefTRIAXone (ROCEPHIN) IV  2,000 mg IntraVENous Q24H    pantoprazole (PROTONIX) 40 mg in sodium chloride (PF) 0.9 % 10 mL injection  40 mg IntraVENous Q12H    mupirocin   Each Nostril BID    dronedarone hcl  400 mg Oral BID WC    heparin (porcine)  5,000 Units SubCUTAneous 3 times per day    balsum peru-castor oil   Topical BID    midodrine  10 mg Oral TID WC    sodium chloride flush  5-40 mL IntraVENous 2 times per day       Continuous Infusions:   norepinephrine Stopped (24 0844)    sodium chloride 5 mL/hr (24 0558)       PRN Meds:  sevelamer, prochlorperazine, diphenhydrAMINE, sodium chloride flush, sodium chloride, polyethylene glycol, acetaminophen **OR** acetaminophen                  Objective:     Temp  Av.6 °F (36.4 °C)  Min: 97.1 °F (36.2 °C)  Max: 98 °F (36.7 °C)  Pulse  Av.3  Min: 62  Max: 86  BP  Min: 84/51  Max: 122/73  SpO2  Av.4 %  Min: 96 %  Max: 100 %  Patient Vitals for the past 4 hrs:   BP Temp Temp src Pulse Resp SpO2   24 0700 (!) 84/51 -- -- 64 12 97 %   24 0600 (!) 93/53 -- -- 62 12 99 %   24 0500 (!) 91/51 -- -- 69 12 97 %   24 0400 (!) 94/51 97.1 °F (36.2 °C) Bladder 85 24 99 %           Intake/Output Summary (Last 24 hours) at 2024 0755  Last data filed at 2024 0600  Gross per 24 hour   Intake 360.02 ml   Output 0 ml   Net 360.02 ml

## 2024-02-23 NOTE — PROGRESS NOTES
Pt finally asleep. Resting quietly. Pt has consistently refused assistance with turning. Pt insists that he can turn himself and does not feel the need to turn and refuses to turn just to allow an assessment.

## 2024-02-23 NOTE — PROGRESS NOTES
Shift assessment complete.    Patient seen awake in bed. Patient is alert and oriented. Patient seen on room air. Patient with no c/o pain. Patient with no s/s of distress noted.    Physical assessment as charted in flow sheets.    Scheduled medications given per orders. Patient refusing midodrine at this time, states he takes 1 hour prior to dialysis and wants this RN to find out when dialysis will be given today. This RN attempted to call dialysis RN with no answer.    Peripheral IV C/D/I and functioning properly.  Central line dressing is clean, dry and intact with no signs of drainage. All tubing is current and dated. IPA caps applied to all access hubs.     Patient is anuric.    Patient refused breakfast. Patient refused assistance repositioning. Patient educated on use of call light and to call out with needs, verbalized understanding. Call light and personal belongings within reach.

## 2024-02-23 NOTE — PROGRESS NOTES
Reassessment complete.    Patient continues awake in bed with unchanged physical assessment. Patient agreeable to taking medications at this time. Patient assisted with lunch. Patient refused repositioning at this time. Patient with call light and personal belongings within reach.

## 2024-02-23 NOTE — CARE COORDINATION
Patient is a [facility] bedhold.    Patient had dialysis today. If BP stable may dc back to [facility] tomorrow (Sat).

## 2024-02-23 NOTE — PROGRESS NOTES
Progress Note    HISTORY     CC:  Shock           We are following for ESRD       Subjective/   HPI:   Remains in the ICU.  BP is better but dropped with dialysis last and required levo.  AM cortisol < 5.  AV graft worked well.  Planning to take midodrine with HD     ROS:  Constitutional:  No fevers, No Chills, + weakness  Cardiovascular:  No palpations, no edema  Respiratory:  No wheezing, no cough  Skin:  No rash, no itching  :  No hematuria, not making much urine     Social Hx:  No Family at the bedside     Past Medical and Surgical History:  - Reviewed, no changes     EXAM       Objective/     Vitals:    02/23/24 1255 02/23/24 1300 02/23/24 1400 02/23/24 1500   BP:  (!) 97/48 100/60 (!) 110/58   Pulse: 74 72 70 68   Resp: 17 12 13 15   Temp:       TempSrc:       SpO2:  99% 100% 100%   Weight:       Height:         24HR INTAKE/OUTPUT:    Intake/Output Summary (Last 24 hours) at 2/23/2024 1611  Last data filed at 2/23/2024 0600  Gross per 24 hour   Intake 300 ml   Output 0 ml   Net 300 ml       Constitutional:  Ill appearing   Access:  Left arm swelling, left arm AV graft       MEDICAL DECISION MAKING       Data/  Recent Labs     02/21/24  0615 02/22/24  0642 02/23/24  0530   WBC 9.6 9.7 6.0   HGB 9.6* 10.0* 9.5*   HCT 30.0* 31.5* 30.4*   MCV 98.0 96.9 99.1    258 169       Recent Labs     02/21/24  0615 02/22/24  0642 02/23/24  0520    137 136   K 4.8 3.5 3.7    98* 98*   CO2 25 27 27   GLUCOSE 150* 143* 142*   BUN 44* 32* 45*   CREATININE 4.9* 4.0* 5.1*   LABGLOM 13* 16* 12*         Assessment/     ESRD:  On dialysis MWF with a working left upper arm graft which has required frequent interventions over the last year.  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  He does have arm swelling and high venous pressures.  Labs are stable  Anemia of chronic disease:  Hb at target of 10 - 11,

## 2024-02-23 NOTE — PROGRESS NOTES
Pt completed 3 hours of hemodialysis and removed 400 ml of fluid. Left arm fistula performed well at 400ml/min blood flow rate, no cannulation or bleeding issues, +bruit/thrill post.     02/23/24 1255 02/23/24 1615   Vital Signs   Temp 97.8 °F (36.6 °C) 98.2 °F (36.8 °C)   Pulse 74 71   Respirations 17 20   BP 91/66 105/65   Height and Weight   Weight - Scale 126.2 kg (278 lb 3.5 oz) 125.8 kg (277 lb 5.4 oz)

## 2024-02-23 NOTE — PROGRESS NOTES
Reassessment complete.    Patient awake in bed. Unchanged physical assessment. Patient completed dialysis with no adverse effects. Patient denies needs. Call light and personal belongings within reach.

## 2024-02-23 NOTE — PROGRESS NOTES
[facility]                                 Pulmonary/Critical care  Consult /Progress Note :                                                                  CC :Hypotension     Patient is being seen at the request of Dr FARRIS for a consultation for ICU management     Subjective  HD HD   On and off levophed  Weakness upper ext less   Off levophed  On hydrocortisone stress dose ,had steroids at home   On RA       PHYSICAL EXAM:  Blood pressure (!) 102/55, pulse 64, temperature 97.3 °F (36.3 °C), temperature source Temporal, resp. rate 12, height 1.778 m (5' 10\"), weight 125.3 kg (276 lb 3.8 oz), SpO2 97 %.' on RA  Gen: No distress.   Eyes: PERRL. No sclera icterus. No conjunctival injection.   ENT: No discharge. Pharynx clear.   Neck: Trachea midline. No obvious mass.    Resp: rhonchi bilateral   GI: Non-tender. Non-distended. No hernia.   Skin: Warm and dry. No nodule on exposed extremities.   Lymph: No cervical LAD. No supraclavicular LAD.   M/S: No cyanosis. No joint deformity. No clubbing.   Neuro: Awake. Alert. Moves all four extremities.   Psych: Oriented x 3. No anxiety.     LABS:  CBC:   Recent Labs     02/21/24  0615 02/22/24  0642 02/23/24  0530   WBC 9.6 9.7 6.0   HGB 9.6* 10.0* 9.5*   HCT 30.0* 31.5* 30.4*   MCV 98.0 96.9 99.1    258 169       BMP:   Recent Labs     02/21/24  0615 02/22/24  0642 02/23/24  0520    137 136   K 4.8 3.5 3.7    98* 98*   CO2 25 27 27   BUN 44* 32* 45*   CREATININE 4.9* 4.0* 5.1*       LIVER PROFILE:   Recent Labs     02/21/24  0615 02/22/24  0642 02/23/24  0520   AST 12* 11* 9*   ALT <5* <5* <5*   BILITOT 0.3 0.3 <0.2   ALKPHOS 57 56 51           Microbiology:  Pending       Imaging:  Chest imaging was reviewed by me and showed no acute process    ASSESSMENT:  Shock unclear etiology ,on Levophed ,cardiomyopathy ,last 25 % and MR   Possible pneumonia    A fib   V

## 2024-02-24 LAB
BACTERIA BLD CULT ORG #2: NORMAL
BACTERIA BLD CULT: NORMAL
DEPRECATED RDW RBC AUTO: 19.5 % (ref 12.4–15.4)
HCT VFR BLD AUTO: 32.4 % (ref 40.5–52.5)
HGB BLD-MCNC: 10.2 G/DL (ref 13.5–17.5)
MCH RBC QN AUTO: 31 PG (ref 26–34)
MCHC RBC AUTO-ENTMCNC: 31.5 G/DL (ref 31–36)
MCV RBC AUTO: 98.4 FL (ref 80–100)
PLATELET # BLD AUTO: 152 K/UL (ref 135–450)
PMV BLD AUTO: 7.8 FL (ref 5–10.5)
RBC # BLD AUTO: 3.3 M/UL (ref 4.2–5.9)
WBC # BLD AUTO: 6.2 K/UL (ref 4–11)

## 2024-02-24 PROCEDURE — 99232 SBSQ HOSP IP/OBS MODERATE 35: CPT | Performed by: INTERNAL MEDICINE

## 2024-02-24 PROCEDURE — 85027 COMPLETE CBC AUTOMATED: CPT

## 2024-02-24 PROCEDURE — 2060000000 HC ICU INTERMEDIATE R&B

## 2024-02-24 PROCEDURE — 6360000002 HC RX W HCPCS: Performed by: INTERNAL MEDICINE

## 2024-02-24 PROCEDURE — 6370000000 HC RX 637 (ALT 250 FOR IP): Performed by: INTERNAL MEDICINE

## 2024-02-24 PROCEDURE — 2580000003 HC RX 258: Performed by: INTERNAL MEDICINE

## 2024-02-24 PROCEDURE — C9113 INJ PANTOPRAZOLE SODIUM, VIA: HCPCS | Performed by: INTERNAL MEDICINE

## 2024-02-24 PROCEDURE — A4216 STERILE WATER/SALINE, 10 ML: HCPCS | Performed by: INTERNAL MEDICINE

## 2024-02-24 RX ADMIN — TRAZODONE HYDROCHLORIDE 50 MG: 50 TABLET ORAL at 05:48

## 2024-02-24 RX ADMIN — PANTOPRAZOLE SODIUM 40 MG: 40 INJECTION, POWDER, FOR SOLUTION INTRAVENOUS at 08:50

## 2024-02-24 RX ADMIN — DRONEDARONE 400 MG: 400 TABLET, FILM COATED ORAL at 16:35

## 2024-02-24 RX ADMIN — HEPARIN SODIUM 5000 UNITS: 5000 INJECTION INTRAVENOUS; SUBCUTANEOUS at 21:48

## 2024-02-24 RX ADMIN — Medication: at 08:49

## 2024-02-24 RX ADMIN — MUPIROCIN: 20 OINTMENT TOPICAL at 21:49

## 2024-02-24 RX ADMIN — Medication 10 ML: at 08:56

## 2024-02-24 RX ADMIN — PANTOPRAZOLE SODIUM 40 MG: 40 INJECTION, POWDER, FOR SOLUTION INTRAVENOUS at 21:48

## 2024-02-24 RX ADMIN — Medication: at 21:48

## 2024-02-24 RX ADMIN — DRONEDARONE 400 MG: 400 TABLET, FILM COATED ORAL at 10:05

## 2024-02-24 RX ADMIN — MIDODRINE HYDROCHLORIDE 10 MG: 10 TABLET ORAL at 11:06

## 2024-02-24 RX ADMIN — HYDROCORTISONE SODIUM SUCCINATE 50 MG: 100 INJECTION, POWDER, FOR SOLUTION INTRAMUSCULAR; INTRAVENOUS at 16:36

## 2024-02-24 RX ADMIN — CEFTRIAXONE SODIUM 2000 MG: 2 INJECTION, POWDER, FOR SOLUTION INTRAMUSCULAR; INTRAVENOUS at 05:57

## 2024-02-24 RX ADMIN — SEVELAMER CARBONATE 3200 MG: 800 TABLET, FILM COATED ORAL at 11:07

## 2024-02-24 RX ADMIN — MIDODRINE HYDROCHLORIDE 10 MG: 10 TABLET ORAL at 16:35

## 2024-02-24 RX ADMIN — HYDROCORTISONE SODIUM SUCCINATE 50 MG: 100 INJECTION, POWDER, FOR SOLUTION INTRAMUSCULAR; INTRAVENOUS at 08:50

## 2024-02-24 RX ADMIN — SEVELAMER CARBONATE 3200 MG: 800 TABLET, FILM COATED ORAL at 16:35

## 2024-02-24 ASSESSMENT — PAIN SCALES - GENERAL
PAINLEVEL_OUTOF10: 0
PAINLEVEL_OUTOF10: 0

## 2024-02-24 NOTE — FLOWSHEET NOTE
02/24/24 1631   Vital Signs   Temp 97.2 °F (36.2 °C)   Temp Source Axillary   Pulse 88   Heart Rate Source Monitor   Respirations 18   BP (!) 92/59   MAP (Calculated) 70   BP Location Right upper arm   BP Method Automatic   Patient Position High fowlers   Pain Assessment   Pain Assessment None - Denies Pain   Oxygen Therapy   SpO2 98 %   O2 Device None (Room air)     Vitals and reassessment completed. No significant changes. BP hypotensive but patient asymptomatic. Medications given per MAR. No further needs at this time.     Ashleigh Sanchez RN

## 2024-02-24 NOTE — PROGRESS NOTES
Admit: 2024    Name:  Kanwal Horta  Room:  /0320-01  MRN:    [id number]     Daily Progress Note for 2024     Patient with end-stage renal disease admitted with hypotension, paresthesias and weakness of both upper extremities  He states that he usually gets hypotensive during dialysis- received midodrine.  This time the hypotension persisted.    Interval History:     Now off levophed .  Transferred out of ICU on .  Patient seen and examined.   Awake alert and oriented and in no distress today vital signs stable blood pressure stable next hemodialysis due for Monday.   MRIBrain and neck pending  Thyroid ultrasound reviewed        Scheduled Meds:   hydrocortisone sodium succinate PF  50 mg IntraVENous Q8H    aspirin  81 mg Oral Daily    sevelamer  3,200 mg Oral TID WC    traZODone  50 mg Oral Nightly    pantoprazole (PROTONIX) 40 mg in sodium chloride (PF) 0.9 % 10 mL injection  40 mg IntraVENous Q12H    mupirocin   Each Nostril BID    dronedarone hcl  400 mg Oral BID WC    heparin (porcine)  5,000 Units SubCUTAneous 3 times per day    balsum peru-castor oil   Topical BID    midodrine  10 mg Oral TID WC    sodium chloride flush  5-40 mL IntraVENous 2 times per day       Continuous Infusions:   norepinephrine Stopped (24 0844)    sodium chloride 5 mL/hr at 24       PRN Meds:  lidocaine, sevelamer, prochlorperazine, diphenhydrAMINE, sodium chloride flush, sodium chloride, polyethylene glycol, acetaminophen **OR** acetaminophen                  Objective:     Temp  Av.6 °F (36.4 °C)  Min: 96.9 °F (36.1 °C)  Max: 98.2 °F (36.8 °C)  Pulse  Av.4  Min: 63  Max: 119  BP  Min: 92/59  Max: 136/91  SpO2  Av.2 %  Min: 95 %  Max: 100 %  Patient Vitals for the past 4 hrs:   BP Temp Temp src Pulse Resp SpO2   24 1631 (!) 92/59 97.2 °F (36.2 °C) Axillary 88 18 98 %           Intake/Output Summary (Last 24 hours) at 2024 1654  Last data filed at 2024 181  Gross  treatment      Diet: gen   DVT Prophylaxis: heparin sc   Full code         Surendra Pedro MD

## 2024-02-24 NOTE — FLOWSHEET NOTE
02/24/24 1104   Vital Signs   Temp 97.4 °F (36.3 °C)   Temp Source Axillary   Pulse 91   Heart Rate Source Monitor   Respirations 19   /75   MAP (Calculated) 89   BP Location Right upper arm   BP Method Automatic   Patient Position Semi fowlers   Pain Assessment   Pain Assessment None - Denies Pain   Oxygen Therapy   SpO2 97 %   O2 Device None (Room air)     Vitals and reassessment completed. Medications given per MAR. No significant changes. No further needs at this time.     Ashleigh Sanchez RN

## 2024-02-24 NOTE — FLOWSHEET NOTE
02/24/24 0920   Vital Signs   Temp 97.5 °F (36.4 °C)   Temp Source Axillary   Pulse 87   Heart Rate Source Monitor   Respirations 18   BP (!) 136/91   MAP (Calculated) 106   BP Location Right upper arm   BP Method Automatic   Patient Position Semi fowlers   Oxygen Therapy   SpO2 95 %   O2 Device None (Room air)     Vitals and assessment completed. Medications given per MAR. Patient refused aspirin and renvela as he does not eat breakfast. Midodrine not given at this time either due to BP. No further needs at this time.     Ashleigh Sanchez, RN

## 2024-02-24 NOTE — PLAN OF CARE
Problem: Discharge Planning  Goal: Discharge to home or other facility with appropriate resources  Outcome: Progressing     Problem: Pain  Goal: Verbalizes/displays adequate comfort level or baseline comfort level  Outcome: Progressing     Problem: Safety - Adult  Goal: Free from fall injury  Outcome: Progressing     Problem: ABCDS Injury Assessment  Goal: Absence of physical injury  Outcome: Progressing     Problem: Skin/Tissue Integrity  Goal: Absence of new skin breakdown  Description: 1.  Monitor for areas of redness and/or skin breakdown  2.  Assess vascular access sites hourly  3.  Every 4-6 hours minimum:  Change oxygen saturation probe site  4.  Every 4-6 hours:  If on nasal continuous positive airway pressure, respiratory therapy assess nares and determine need for appliance change or resting period.  Outcome: Progressing     Problem: Risk for Elopement  Goal: Patient will not exit the unit/facility without proper excort  Outcome: Progressing     Problem: Respiratory - Adult  Goal: Achieves optimal ventilation and oxygenation  Outcome: Progressing     Problem: Cardiovascular - Adult  Goal: Maintains optimal cardiac output and hemodynamic stability  Outcome: Progressing  Goal: Absence of cardiac dysrhythmias or at baseline  Outcome: Progressing     Problem: Metabolic/Fluid and Electrolytes - Adult  Goal: Electrolytes maintained within normal limits  Outcome: Progressing  Goal: Hemodynamic stability and optimal renal function maintained  Outcome: Progressing  Goal: Glucose maintained within prescribed range  Outcome: Progressing

## 2024-02-24 NOTE — PROGRESS NOTES
Heparin not given at this time due to patient sleeping. Pt does not want to be bothered if asleep.     Ashleigh Sanchez RN

## 2024-02-24 NOTE — FLOWSHEET NOTE
In to see patient, alert and oriented, refusing medication until 10 pm as he states that's when he would like all of his medication, attempted to do assessment for patient to roll over, but refusing to roll all the way over for me to look at his back and bottom, noticed vascular changes to bilateral lower extremeties, IV assessed, discussed plan of care, call light witihn reach, no further needs at this time

## 2024-02-24 NOTE — PROGRESS NOTES
Progress Note    HISTORY     CC:  Shock           We are following for ESRD       Subjective/   HPI:   Out of the ICU. Tolerated dialysis with no levo yesterday.  Only 0.4 L of UF.  On room air but says he is fluid overloaded and not leaving the hospital until he is able to tolerate dialysis and gets an MRI of the neck      ROS:  Constitutional:  No fevers, No Chills, + weakness  Cardiovascular:  No palpations, no edema  Respiratory:  No wheezing, no cough  Skin:  No rash, no itching  :  No hematuria, not making much urine     Social Hx:  No Family at the bedside     Past Medical and Surgical History:  - Reviewed, no changes     EXAM       Objective/     Vitals:    02/24/24 0545 02/24/24 0600 02/24/24 0850 02/24/24 0920   BP: (!) 102/47   (!) 136/91   Pulse: 78  80 87   Resp: 16   18   Temp: 98.1 °F (36.7 °C)   97.5 °F (36.4 °C)   TempSrc:    Axillary   SpO2:    95%   Weight:  126.4 kg (278 lb 9.6 oz)     Height:         24HR INTAKE/OUTPUT:    Intake/Output Summary (Last 24 hours) at 2/24/2024 1019  Last data filed at 2/23/2024 1816  Gross per 24 hour   Intake 107.66 ml   Output --   Net 107.66 ml       Constitutional:  Ill appearing   Access:  Left arm swelling, left arm AV graft       MEDICAL DECISION MAKING       Data/  Recent Labs     02/22/24  0642 02/23/24  0530 02/24/24  0625   WBC 9.7 6.0 6.2   HGB 10.0* 9.5* 10.2*   HCT 31.5* 30.4* 32.4*   MCV 96.9 99.1 98.4    169 152       Recent Labs     02/22/24  0642 02/23/24  0520    136   K 3.5 3.7   CL 98* 98*   CO2 27 27   GLUCOSE 143* 142*   BUN 32* 45*   CREATININE 4.0* 5.1*   LABGLOM 16* 12*         Assessment/     ESRD:  On dialysis MWF with a working left upper arm graft which has required frequent interventions over the last year.  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  He does have arm swelling and high venous pressures.  Labs are

## 2024-02-24 NOTE — PROGRESS NOTES
[facility]                                 Pulmonary/Critical care  Consult /Progress Note :                                                                  CC :Hypotension     Patient is being seen at the request of Dr FARRIS for a consultation for ICU management     Subjective   HD   On and off levophed  Weakness upper ext less   Off levophed  On hydrocortisone stress dose ,had steroids at home   On RA       PHYSICAL EXAM:  Blood pressure (!) 102/47, pulse 78, temperature 98.1 °F (36.7 °C), resp. rate 16, height 1.778 m (5' 10\"), weight 126.4 kg (278 lb 9.6 oz), SpO2 99 %.' on RA  Gen: No distress.   Eyes: PERRL. No sclera icterus. No conjunctival injection.   ENT: No discharge. Pharynx clear.   Neck: Trachea midline. No obvious mass.    Resp: rhonchi bilateral   GI: Non-tender. Non-distended. No hernia.   Skin: Warm and dry. No nodule on exposed extremities.   Lymph: No cervical LAD. No supraclavicular LAD.   M/S: No cyanosis. No joint deformity. No clubbing.   Neuro: Awake. Alert. Moves all four extremities.   Psych: Oriented x 3. No anxiety.     LABS:  CBC:   Recent Labs     02/22/24  0642 02/23/24  0530 02/24/24  0625   WBC 9.7 6.0 6.2   HGB 10.0* 9.5* 10.2*   HCT 31.5* 30.4* 32.4*   MCV 96.9 99.1 98.4    169 152       BMP:   Recent Labs     02/22/24  0642 02/23/24  0520    136   K 3.5 3.7   CL 98* 98*   CO2 27 27   BUN 32* 45*   CREATININE 4.0* 5.1*       LIVER PROFILE:   Recent Labs     02/22/24  0642 02/23/24  0520   AST 11* 9*   ALT <5* <5*   BILITOT 0.3 <0.2   ALKPHOS 56 51           Microbiology:  Pending       Imaging:  Chest imaging was reviewed by me and showed no acute process    ASSESSMENT:  Shock unclear etiology ,on Levophed ,cardiomyopathy ,last 25 % and MR   Possible pneumonia    A fib   V tach  History WPW s/p ablation   NICM  ESRD on HD  S/P ICD   Possible adrenal insuffiencey  Possible

## 2024-02-24 NOTE — PROGRESS NOTES
Multaq given, as med was unavailable until now. Patient requesting people stay out of his room until lunch time.    Ashleigh Sanchez RN

## 2024-02-25 LAB
DEPRECATED RDW RBC AUTO: 19.5 % (ref 12.4–15.4)
HCT VFR BLD AUTO: 34.4 % (ref 40.5–52.5)
HGB BLD-MCNC: 10.7 G/DL (ref 13.5–17.5)
MCH RBC QN AUTO: 31.2 PG (ref 26–34)
MCHC RBC AUTO-ENTMCNC: 31.2 G/DL (ref 31–36)
MCV RBC AUTO: 99.9 FL (ref 80–100)
PLATELET # BLD AUTO: 191 K/UL (ref 135–450)
PMV BLD AUTO: 8 FL (ref 5–10.5)
RBC # BLD AUTO: 3.45 M/UL (ref 4.2–5.9)
WBC # BLD AUTO: 7.8 K/UL (ref 4–11)

## 2024-02-25 PROCEDURE — 2580000003 HC RX 258: Performed by: INTERNAL MEDICINE

## 2024-02-25 PROCEDURE — 6360000002 HC RX W HCPCS: Performed by: INTERNAL MEDICINE

## 2024-02-25 PROCEDURE — 2060000000 HC ICU INTERMEDIATE R&B

## 2024-02-25 PROCEDURE — A4216 STERILE WATER/SALINE, 10 ML: HCPCS | Performed by: INTERNAL MEDICINE

## 2024-02-25 PROCEDURE — 6370000000 HC RX 637 (ALT 250 FOR IP): Performed by: INTERNAL MEDICINE

## 2024-02-25 PROCEDURE — 36415 COLL VENOUS BLD VENIPUNCTURE: CPT

## 2024-02-25 PROCEDURE — 85027 COMPLETE CBC AUTOMATED: CPT

## 2024-02-25 PROCEDURE — C9113 INJ PANTOPRAZOLE SODIUM, VIA: HCPCS | Performed by: INTERNAL MEDICINE

## 2024-02-25 PROCEDURE — 99232 SBSQ HOSP IP/OBS MODERATE 35: CPT | Performed by: INTERNAL MEDICINE

## 2024-02-25 RX ADMIN — PANTOPRAZOLE SODIUM 40 MG: 40 INJECTION, POWDER, FOR SOLUTION INTRAVENOUS at 08:26

## 2024-02-25 RX ADMIN — HYDROCORTISONE SODIUM SUCCINATE 50 MG: 100 INJECTION, POWDER, FOR SOLUTION INTRAMUSCULAR; INTRAVENOUS at 03:06

## 2024-02-25 RX ADMIN — MIDODRINE HYDROCHLORIDE 10 MG: 10 TABLET ORAL at 08:27

## 2024-02-25 RX ADMIN — Medication 10 ML: at 08:30

## 2024-02-25 RX ADMIN — Medication: at 08:30

## 2024-02-25 RX ADMIN — MIDODRINE HYDROCHLORIDE 10 MG: 10 TABLET ORAL at 16:30

## 2024-02-25 RX ADMIN — SEVELAMER CARBONATE 3200 MG: 800 TABLET, FILM COATED ORAL at 16:31

## 2024-02-25 RX ADMIN — HYDROCORTISONE SODIUM SUCCINATE 50 MG: 100 INJECTION, POWDER, FOR SOLUTION INTRAMUSCULAR; INTRAVENOUS at 08:25

## 2024-02-25 RX ADMIN — SEVELAMER CARBONATE 3200 MG: 800 TABLET, FILM COATED ORAL at 11:06

## 2024-02-25 RX ADMIN — DRONEDARONE 400 MG: 400 TABLET, FILM COATED ORAL at 08:27

## 2024-02-25 RX ADMIN — TRAZODONE HYDROCHLORIDE 50 MG: 50 TABLET ORAL at 03:06

## 2024-02-25 RX ADMIN — HEPARIN SODIUM 5000 UNITS: 5000 INJECTION INTRAVENOUS; SUBCUTANEOUS at 14:33

## 2024-02-25 RX ADMIN — DRONEDARONE 400 MG: 400 TABLET, FILM COATED ORAL at 16:30

## 2024-02-25 RX ADMIN — MIDODRINE HYDROCHLORIDE 10 MG: 10 TABLET ORAL at 11:06

## 2024-02-25 ASSESSMENT — PAIN SCALES - GENERAL: PAINLEVEL_OUTOF10: 0

## 2024-02-25 NOTE — FLOWSHEET NOTE
02/25/24 1103   Vital Signs   Temp 97.1 °F (36.2 °C)   Temp Source Axillary   Pulse 92   Heart Rate Source Monitor   Respirations 18   /72   MAP (Calculated) 88   BP Location Right upper arm   BP Method Automatic   Patient Position High fowlers   Pain Assessment   Pain Assessment None - Denies Pain   Oxygen Therapy   SpO2 98 %   O2 Device None (Room air)     Vitals and reassessment completed. No significant changes. Medications given per MAR. No further needs at this time.    Ashleigh Sanchez RN

## 2024-02-25 NOTE — PLAN OF CARE
Problem: Discharge Planning  Goal: Discharge to home or other facility with appropriate resources  Outcome: Progressing     Problem: Pain  Goal: Verbalizes/displays adequate comfort level or baseline comfort level  Outcome: Progressing     Problem: Safety - Adult  Goal: Free from fall injury  Outcome: Progressing     Problem: ABCDS Injury Assessment  Goal: Absence of physical injury  Outcome: Progressing     Problem: Skin/Tissue Integrity  Goal: Absence of new skin breakdown  Description: 1.  Monitor for areas of redness and/or skin breakdown  2.  Assess vascular access sites hourly  3.  Every 4-6 hours minimum:  Change oxygen saturation probe site  4.  Every 4-6 hours:  If on nasal continuous positive airway pressure, respiratory therapy assess nares and determine need for appliance change or resting period.  Outcome: Progressing     Problem: Risk for Elopement  Goal: Patient will not exit the unit/facility without proper excort  Outcome: Progressing  Flowsheets  Taken 2/25/2024 0420 by Vicky Hendrickson, RN  Nursing Interventions for Elopement Risk: Assist with personal care needs such as toileting, eating, dressing, as needed to reduce the risk of wandering  Taken 2/25/2024 0025 by Vicky Hendrickson, RN  Nursing Interventions for Elopement Risk: Assist with personal care needs such as toileting, eating, dressing, as needed to reduce the risk of wandering  Taken 2/24/2024 2050 by Vicky Hendrickson, RN  Nursing Interventions for Elopement Risk: Assist with personal care needs such as toileting, eating, dressing, as needed to reduce the risk of wandering     Problem: Respiratory - Adult  Goal: Achieves optimal ventilation and oxygenation  Outcome: Progressing     Problem: Cardiovascular - Adult  Goal: Maintains optimal cardiac output and hemodynamic stability  Outcome: Progressing  Goal: Absence of cardiac dysrhythmias or at baseline  Outcome: Progressing     Problem: Metabolic/Fluid and Electrolytes - Adult  Goal:

## 2024-02-25 NOTE — PROGRESS NOTES
Admit: 2024    Name:  Kanwal Horta  Room:  /0320-01  MRN:    [id number]     Daily Progress Note for 2024     Patient with end-stage renal disease admitted with hypotension, paresthesias and weakness of both upper extremities  He states that he usually gets hypotensive during dialysis- received midodrine.  This time the hypotension persisted.    Interval History:     Now off levophed .  Transferred out of ICU on .  Patient seen and examined.   Awake alert and oriented and in no distress today vital signs stable blood pressure stable next hemodialysis due for Monday.   MRIBrain and neck pending  Thyroid ultrasound reviewed       Patient is doing well today no complaints    Plan is to monitor blood pressure with dialysis tomorrow  MRI brain and neck pending for tomorrow        Scheduled Meds:   hydrocortisone sodium succinate PF  50 mg IntraVENous Q8H    aspirin  81 mg Oral Daily    sevelamer  3,200 mg Oral TID WC    traZODone  50 mg Oral Nightly    pantoprazole (PROTONIX) 40 mg in sodium chloride (PF) 0.9 % 10 mL injection  40 mg IntraVENous Q12H    dronedarone hcl  400 mg Oral BID WC    heparin (porcine)  5,000 Units SubCUTAneous 3 times per day    balsum peru-castor oil   Topical BID    midodrine  10 mg Oral TID WC    sodium chloride flush  5-40 mL IntraVENous 2 times per day       Continuous Infusions:   sodium chloride 5 mL/hr at 24 1816       PRN Meds:  lidocaine, sevelamer, prochlorperazine, diphenhydrAMINE, sodium chloride flush, sodium chloride, polyethylene glycol, acetaminophen **OR** acetaminophen                  Objective:     Temp  Av.3 °F (36.3 °C)  Min: 97.1 °F (36.2 °C)  Max: 97.5 °F (36.4 °C)  Pulse  Av  Min: 82  Max: 92  BP  Min: 92/59  Max: 120/72  SpO2  Av.4 %  Min: 95 %  Max: 98 %  Patient Vitals for the past 4 hrs:   BP Temp Temp src Pulse Resp SpO2   24 1103 120/72 97.1 °F (36.2 °C) Axillary 92 18 98 %   24 0822 106/77 97.5 °F (36.4

## 2024-02-25 NOTE — PROGRESS NOTES
Bedside report and transfer of care given to DARLING Perry. Pt currently resting in bed with the call light within reach. Pt denies any other care needs at this time. Pt stable at this time.    Ashleigh Sanchez RN

## 2024-02-25 NOTE — FLOWSHEET NOTE
02/25/24 0822   Vital Signs   Temp 97.5 °F (36.4 °C)   Temp Source Axillary   Pulse 82   Heart Rate Source Monitor   Respirations 19   /77   MAP (Calculated) 87   BP Location Right upper arm   BP Method Automatic   Patient Position Semi fowlers   Pain Assessment   Pain Assessment None - Denies Pain   Oxygen Therapy   SpO2 95 %   O2 Device None (Room air)     Vitals and assessment completed this AM. No s/s of distress. Medications given per MAR. Pt refused aspirin and renvela this AM. Pt states he did not sleep well last night and would like to be left alone as much as possible today. All concerns addressed at this time. No further needs.     Ashleigh Sanchez RN

## 2024-02-25 NOTE — FLOWSHEET NOTE
02/25/24 1623   Vital Signs   Temp 97.3 °F (36.3 °C)   Temp Source Axillary   Pulse 82   Heart Rate Source Monitor   Respirations 19   /63   MAP (Calculated) 76   BP Location Right upper arm   BP Method Automatic   Patient Position Semi fowlers   Pain Assessment   Pain Assessment None - Denies Pain   Oxygen Therapy   SpO2 99 %   O2 Device None (Room air)     Vitals and reassessment completed. No significant changes. Medications given per MAR. No further needs at this time.     Ashleigh Sanchez RN

## 2024-02-25 NOTE — PROGRESS NOTES
[facility]                                 Pulmonary/Critical care  Consult /Progress Note :                                                                  CC :Hypotension     Patient is being seen at the request of Dr FARRIS for a consultation for ICU management     Subjective   HD   States has some sputum with cough again   Weakness upper ext less   On hydrocortisone stress dose ,had steroids at home   On RA       PHYSICAL EXAM:  Blood pressure 120/72, pulse 92, temperature 97.1 °F (36.2 °C), temperature source Axillary, resp. rate 18, height 1.778 m (5' 10\"), weight 126.6 kg (279 lb), SpO2 98 %.' on RA  Gen: No distress.   Eyes: PERRL. No sclera icterus. No conjunctival injection.   ENT: No discharge. Pharynx clear.   Neck: Trachea midline. No obvious mass.    Resp: rhonchi bilateral   GI: Non-tender. Non-distended. No hernia.   Skin: Warm and dry. No nodule on exposed extremities.   Lymph: No cervical LAD. No supraclavicular LAD.   M/S: No cyanosis. No joint deformity. No clubbing.   Neuro: Awake. Alert. Moves all four extremities.   Psych: Oriented x 3. No anxiety.     LABS:  CBC:   Recent Labs     02/23/24  0530 02/24/24  0625 02/25/24  0333   WBC 6.0 6.2 7.8   HGB 9.5* 10.2* 10.7*   HCT 30.4* 32.4* 34.4*   MCV 99.1 98.4 99.9    152 191       BMP:   Recent Labs     02/23/24  0520      K 3.7   CL 98*   CO2 27   BUN 45*   CREATININE 5.1*       LIVER PROFILE:   Recent Labs     02/23/24  0520   AST 9*   ALT <5*   BILITOT <0.2   ALKPHOS 51           Microbiology:  Pending       Imaging:  Chest imaging was reviewed by me and showed no acute process      Sputum RSV+    ASSESSMENT:  Shock unclear etiology ,on Levophed ,cardiomyopathy ,last 25 % and MR   Possible pneumonia    A fib   V tach  History WPW s/p ablation   NICM  ESRD on HD  S/P ICD   Possible adrenal insuffiencey  Possible OWEN  Possible TIA     PLAN:  Wean  steroids to PO next 1-2 days decreased today q12   On Multaq  Keep rocephin for now ,pending full culture .resend sputum culture  HD per renal   On hydrocortisone ,will start weaning  Keep sat above 95  Adrenal work up ,on hydrocortisone   Watch BP as has low BP this am before   Off   Will start ASA and get clearance from GI for plavix as per neurology recommendation  Sputum culture negative   Stop vanomcyin ,  Procal   Keep rocephin Day 4 #5  S/p Watchman Procedure  Neurology/cardiology following appreciate input  Work up for TiA as per Neurology  May need sleep study as OP  DVT px   Can be discharged pulmonary

## 2024-02-25 NOTE — PROGRESS NOTES
Progress Note    HISTORY     CC:  Shock           We are following for ESRD       Subjective/   HPI:   Out of the ICU. Tolerated dialysis with no levo .  Only 0.4 L of UF.  On room air but says he is fluid overloaded and not leaving the hospital until he is able to tolerate dialysis and gets an MRI of the neck.  He has been on dialysis for 11 years.  Early in his history he had very high IDWG and likely has had myocardial stunning     ROS:  Constitutional:  No fevers, No Chills, + weakness  Cardiovascular:  No palpations, no edema  Respiratory:  No wheezing, no cough  Skin:  No rash, no itching  :  No hematuria, not making much urine     Social Hx:  No Family at the bedside     Past Medical and Surgical History:  - Reviewed, no changes     EXAM       Objective/     Vitals:    02/24/24 2145 02/25/24 0315 02/25/24 0329 02/25/24 0822   BP: 107/72 113/71  106/77   Pulse: 91 92  82   Resp: 16 16  19   Temp: 97.4 °F (36.3 °C) 97.2 °F (36.2 °C)  97.5 °F (36.4 °C)   TempSrc: Axillary Axillary  Axillary   SpO2: 98% 98%  95%   Weight:   126.6 kg (279 lb)    Height:         24HR INTAKE/OUTPUT:  No intake or output data in the 24 hours ending 02/25/24 1018    Constitutional:  Ill appearing   Access:  Left arm swelling, left arm AV graft       MEDICAL DECISION MAKING       Data/  Recent Labs     02/23/24  0530 02/24/24  0625 02/25/24  0333   WBC 6.0 6.2 7.8   HGB 9.5* 10.2* 10.7*   HCT 30.4* 32.4* 34.4*   MCV 99.1 98.4 99.9    152 191       Recent Labs     02/23/24  0520      K 3.7   CL 98*   CO2 27   GLUCOSE 142*   BUN 45*   CREATININE 5.1*   LABGLOM 12*         Assessment/     ESRD:  On dialysis MWF with a working left upper arm graft which has required frequent interventions over the last year.  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  He does have arm swelling and high venous pressures.  Labs are  stable.  He does have a right IJ central line   Anemia of chronic disease:  Hb at target of 10 - 11, due to ESRD and on CORWIN  Shock:  Adrenal insufficiency +/- septic picture.  Off pressors.  On midodrine and steroids.  BP still soft   Bilateral UE weakness/paresthesia - CTA negative for stroke.  ? Due to hypotension.  Does have a history of atrial fibrillation   Atrial Fibrillation:  Rate controlled.  Not on anticoagulation     Plan/     Continue Midodrine   Solu-cortef   MWF - will try 1-2 kg UF next treatment.    He has been on dialysis for 11 years.  Unfortunately he already had problems with blood pressure related to long terms dialysis effects and intermittent myocardial stunning.  With adrenal insufficiency it will be difficult to handle high volume over fluid and he says he will continue to enjoy his food.      Thank you for asking us to participate in the management of your patient, please do not hesitate to contact me for any concerns regarding my recommendations as outlined above.    -----------------------------  Ruby Reynolds M.D.   [facility]

## 2024-02-26 VITALS
BODY MASS INDEX: 38.41 KG/M2 | DIASTOLIC BLOOD PRESSURE: 43 MMHG | OXYGEN SATURATION: 96 % | HEART RATE: 88 BPM | WEIGHT: 268.3 LBS | SYSTOLIC BLOOD PRESSURE: 117 MMHG | HEIGHT: 70 IN | RESPIRATION RATE: 18 BRPM | TEMPERATURE: 97.6 F

## 2024-02-26 LAB
ALBUMIN SERPL-MCNC: 3.2 G/DL (ref 3.4–5)
ANION GAP SERPL CALCULATED.3IONS-SCNC: 11 MMOL/L (ref 3–16)
BASOPHILS # BLD: 0 K/UL (ref 0–0.2)
BASOPHILS NFR BLD: 0.3 %
BUN SERPL-MCNC: 53 MG/DL (ref 7–20)
CALCIUM SERPL-MCNC: 8.6 MG/DL (ref 8.3–10.6)
CHLORIDE SERPL-SCNC: 98 MMOL/L (ref 99–110)
CO2 SERPL-SCNC: 29 MMOL/L (ref 21–32)
CREAT SERPL-MCNC: 5.8 MG/DL (ref 0.9–1.3)
DEPRECATED RDW RBC AUTO: 20.1 % (ref 12.4–15.4)
EOSINOPHIL # BLD: 0 K/UL (ref 0–0.6)
EOSINOPHIL NFR BLD: 0.3 %
GFR SERPLBLD CREATININE-BSD FMLA CKD-EPI: 10 ML/MIN/{1.73_M2}
GLUCOSE SERPL-MCNC: 130 MG/DL (ref 70–99)
HCT VFR BLD AUTO: 36 % (ref 40.5–52.5)
HGB BLD-MCNC: 11.3 G/DL (ref 13.5–17.5)
LYMPHOCYTES # BLD: 0.5 K/UL (ref 1–5.1)
LYMPHOCYTES NFR BLD: 7.3 %
MCH RBC QN AUTO: 31.5 PG (ref 26–34)
MCHC RBC AUTO-ENTMCNC: 31.4 G/DL (ref 31–36)
MCV RBC AUTO: 100.5 FL (ref 80–100)
MONOCYTES # BLD: 0.2 K/UL (ref 0–1.3)
MONOCYTES NFR BLD: 2.8 %
NEUTROPHILS # BLD: 6.3 K/UL (ref 1.7–7.7)
NEUTROPHILS NFR BLD: 89.3 %
PHOSPHATE SERPL-MCNC: 4.1 MG/DL (ref 2.5–4.9)
PLATELET # BLD AUTO: 174 K/UL (ref 135–450)
PMV BLD AUTO: 7.8 FL (ref 5–10.5)
POTASSIUM SERPL-SCNC: 4.1 MMOL/L (ref 3.5–5.1)
RBC # BLD AUTO: 3.58 M/UL (ref 4.2–5.9)
SODIUM SERPL-SCNC: 138 MMOL/L (ref 136–145)
WBC # BLD AUTO: 7.1 K/UL (ref 4–11)

## 2024-02-26 PROCEDURE — A4216 STERILE WATER/SALINE, 10 ML: HCPCS | Performed by: INTERNAL MEDICINE

## 2024-02-26 PROCEDURE — 80069 RENAL FUNCTION PANEL: CPT

## 2024-02-26 PROCEDURE — 2580000003 HC RX 258: Performed by: INTERNAL MEDICINE

## 2024-02-26 PROCEDURE — 99239 HOSP IP/OBS DSCHRG MGMT >30: CPT | Performed by: INTERNAL MEDICINE

## 2024-02-26 PROCEDURE — 99232 SBSQ HOSP IP/OBS MODERATE 35: CPT | Performed by: INTERNAL MEDICINE

## 2024-02-26 PROCEDURE — 6360000002 HC RX W HCPCS: Performed by: INTERNAL MEDICINE

## 2024-02-26 PROCEDURE — 85025 COMPLETE CBC W/AUTO DIFF WBC: CPT

## 2024-02-26 PROCEDURE — 6370000000 HC RX 637 (ALT 250 FOR IP): Performed by: INTERNAL MEDICINE

## 2024-02-26 PROCEDURE — 90935 HEMODIALYSIS ONE EVALUATION: CPT

## 2024-02-26 PROCEDURE — C9113 INJ PANTOPRAZOLE SODIUM, VIA: HCPCS | Performed by: INTERNAL MEDICINE

## 2024-02-26 RX ORDER — HYDROCORTISONE 10 MG/1
10 TABLET ORAL EVERY EVENING
Status: DISCONTINUED | OUTPATIENT
Start: 2024-02-26 | End: 2024-02-26 | Stop reason: HOSPADM

## 2024-02-26 RX ORDER — HYDROCORTISONE 10 MG/1
20 TABLET ORAL EVERY MORNING
Status: DISCONTINUED | OUTPATIENT
Start: 2024-02-27 | End: 2024-02-26 | Stop reason: HOSPADM

## 2024-02-26 RX ORDER — MIDODRINE HYDROCHLORIDE 10 MG/1
10 TABLET ORAL
Status: COMPLETED | OUTPATIENT
Start: 2024-02-26 | End: 2024-02-26

## 2024-02-26 RX ADMIN — Medication: at 03:18

## 2024-02-26 RX ADMIN — SEVELAMER CARBONATE 3200 MG: 800 TABLET, FILM COATED ORAL at 11:50

## 2024-02-26 RX ADMIN — MIDODRINE HYDROCHLORIDE 10 MG: 10 TABLET ORAL at 10:06

## 2024-02-26 RX ADMIN — TRAZODONE HYDROCHLORIDE 50 MG: 50 TABLET ORAL at 03:11

## 2024-02-26 RX ADMIN — PANTOPRAZOLE SODIUM 40 MG: 40 INJECTION, POWDER, FOR SOLUTION INTRAVENOUS at 03:11

## 2024-02-26 RX ADMIN — Medication: at 11:52

## 2024-02-26 RX ADMIN — HYDROCORTISONE SODIUM SUCCINATE 50 MG: 100 INJECTION, POWDER, FOR SOLUTION INTRAMUSCULAR; INTRAVENOUS at 03:11

## 2024-02-26 RX ADMIN — MIDODRINE HYDROCHLORIDE 10 MG: 10 TABLET ORAL at 07:14

## 2024-02-26 RX ADMIN — MIDODRINE HYDROCHLORIDE 10 MG: 10 TABLET ORAL at 11:50

## 2024-02-26 NOTE — PROGRESS NOTES
Patient educated on discharge instructions as well as new medications use, dosage, administration and possible side effects.  Patient verified knowledge. IV removed without difficulty and dry dressing in place. Telemetry monitor removed and returned to [facility]. [facility] with all of their personal belongings. Patient stable at this time.     Ashleigh Sanchez RN

## 2024-02-26 NOTE — PROGRESS NOTES
Writer accessed chart to assist primary RN to locate patient implanted cardiac device to provided for MRI. Writer called Dr. Bui office [phone] to obtain record of device. While on the phone with office personal writer was able to locate device from [employer].     Device as follows:     Lead: [employer]  [id number], [id number]  Device: [employer], [employer] MRI, [id number]    Information provided to primary RN. See primary RN notes for more details.

## 2024-02-26 NOTE — PROGRESS NOTES
Patient given Mididrine before Dialysis, blood work drawn and sent to lab.  Patient with two large bowel movements throughotu the night, redness and excoriation in groin and bottom, mepilex and cream applied.  Report to DARLING Sotelo, transfer of care at this time.

## 2024-02-26 NOTE — PLAN OF CARE
Problem: Discharge Planning  Goal: Discharge to home or other facility with appropriate resources  Outcome: Progressing     Problem: Pain  Goal: Verbalizes/displays adequate comfort level or baseline comfort level  Outcome: Progressing     Problem: Safety - Adult  Goal: Free from fall injury  Outcome: Progressing     Problem: ABCDS Injury Assessment  Goal: Absence of physical injury  Outcome: Progressing     Problem: Skin/Tissue Integrity  Goal: Absence of new skin breakdown  Description: 1.  Monitor for areas of redness and/or skin breakdown  2.  Assess vascular access sites hourly  3.  Every 4-6 hours minimum:  Change oxygen saturation probe site  4.  Every 4-6 hours:  If on nasal continuous positive airway pressure, respiratory therapy assess nares and determine need for appliance change or resting period.  Outcome: Progressing     Problem: Risk for Elopement  Goal: Patient will not exit the unit/facility without proper excort  Outcome: Progressing  Flowsheets  Taken 2/26/2024 0310 by Vicky Hendrickson, RN  Nursing Interventions for Elopement Risk: Assist with personal care needs such as toileting, eating, dressing, as needed to reduce the risk of wandering  Taken 2/26/2024 0020 by Vicky Hendrickson, RN  Nursing Interventions for Elopement Risk: Assist with personal care needs such as toileting, eating, dressing, as needed to reduce the risk of wandering  Taken 2/25/2024 2045 by Vicyk Hendrickson, RN  Nursing Interventions for Elopement Risk: Assist with personal care needs such as toileting, eating, dressing, as needed to reduce the risk of wandering     Problem: Respiratory - Adult  Goal: Achieves optimal ventilation and oxygenation  Outcome: Progressing     Problem: Cardiovascular - Adult  Goal: Maintains optimal cardiac output and hemodynamic stability  Outcome: Progressing  Goal: Absence of cardiac dysrhythmias or at baseline  Outcome: Progressing     Problem: Metabolic/Fluid and Electrolytes - Adult  Goal:

## 2024-02-26 NOTE — FLOWSHEET NOTE
02/26/24 1051   Vital Signs   Temp 97.6 °F (36.4 °C)   Pulse 88   Respirations 18   BP (!) 117/43   MAP (Calculated) 68   Oxygen Therapy   SpO2 96 %   O2 Device None (Room air)   Height and Weight   Weight - Scale 121.7 kg (268 lb 4.8 oz)   Weight Method Bed scale   BMI (Calculated) 38.6     Patient arrived back to unit from dialysis. Medications given per MAR. Patient is extremely upset at MD and other staff due to him possibly being discharged today. Pt states we \"have not fixed the problem,\" and fears that he will be paralyzed if the numbness happens again. Explained to patient that we are unable to do MRI here at [facility], but they would be able to at [facility]. Patient yelling at RN, stating he wants a patient advocate and does not believe that we are treating him fairly. MRI is to be scheduled before discharge. IR consult was called this AM and they also stated that this is an outpatient procedure and would schedule once the order is placed.     Clinical and Charge updated on patient's frustration. Patient wanting to be left alone at this time. Patient not understanding of POC.      Ashleigh Sanchez RN

## 2024-02-26 NOTE — PROGRESS NOTES
Progress Note    HISTORY     CC:  Shock           We are following for ESRD       Subjective/     HPI:     Tolerated dialysis today with 2.5 liters removed, post-weight of 121.7 kg  Denies any shortness of breath, on RA.  Says he is fluid overloaded and not leaving the hospital until he is able to tolerate dialysis and gets an MRI of the neck.      ROS:  +weak, intake adequate.    EXAM       Objective/     Vitals:    02/26/24 0354 02/26/24 0505 02/26/24 0738 02/26/24 0850   BP:  93/62 109/63    Pulse:  87 89 88   Resp:  19 18    Temp:  97.4 °F (36.3 °C) 97.2 °F (36.2 °C)    TempSrc:  Oral     SpO2:  97%     Weight: 124.2 kg (273 lb 12.8 oz)  124.2 kg (273 lb 13 oz)    Height:         24HR INTAKE/OUTPUT:  No intake or output data in the 24 hours ending 02/26/24 1134  Constitutional:  Ill appearing   HEENT: non-icteric, no JVD  CV: S1, S2, irregular without m/r/g; + UE/LE edema  RESP: CTA B without w/r/r; breathing wnl  ABD: +bs, soft, nt, no hsm  SKIN: warm, no rashes  ACCESS:  Left arm swelling, left arm AV graft       MEDICAL DECISION MAKING       Data/  Recent Labs     02/24/24  0625 02/25/24  0333 02/26/24  0700   WBC 6.2 7.8 7.1   HGB 10.2* 10.7* 11.3*   HCT 32.4* 34.4* 36.0*   MCV 98.4 99.9 100.5*    191 174       Recent Labs     02/26/24  0700      K 4.1   CL 98*   CO2 29   GLUCOSE 130*   PHOS 4.1   BUN 53*   CREATININE 5.8*   LABGLOM 10*         Assessment/     ESRD:  On dialysis MWF with a working left upper arm graft which has required frequent interventions over the last year.  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  He does have arm swelling and high venous pressures.  Labs are stable.  He does have a right IJ central line   Anemia of chronic disease:  Hb at target of 10 - 11, due to ESRD and on CORWIN  Shock:  Adrenal insufficiency +/- septic picture.  Off pressors.  On midodrine and  steroids.  BP still soft   Bilateral UE weakness/paresthesia - CTA negative for stroke.  ? Due to hypotension.  Does have a history of atrial fibrillation   Atrial Fibrillation:  Rate controlled.  Not on anticoagulation     Plan/     -HD MWF with UF as tolerated  -Continue Midodrine 10 mg po tid  -He has been on dialysis for 11 years.  Unfortunately he already had problems with blood pressure related to long terms dialysis effects and intermittent myocardial stunning.  With adrenal insufficiency it will be difficult to handle high volume over fluid and he says he will continue to enjoy his food  -Trend labs, bp's, weights

## 2024-02-26 NOTE — PROGRESS NOTES
Consult has been called to Dr. Díaz on 2/26/24. Spoke with Summer. 7:20 AM    Abbi Guzman  2/26/2024

## 2024-02-26 NOTE — PROGRESS NOTES
Assessed patient in dialysis. No s/s of distress. BP maintaining at this time. PT updated on the inability to scan him for MRI due to Watchman/Defibrillator in place. Patient upset about this but understanding. He has also stated that he will refuse to leave the hospital before his problem is solved.    Ashleigh Sanchez RN

## 2024-02-26 NOTE — PROGRESS NOTES
treatment      Diet: gen   DVT Prophylaxis: heparin sc   Full code          BP -  stable .   Can DC to SNF      Thyroid biopsy not done as inpt- will need it done as outpt .   Will need MRI done at [facility]      Total time 45 minutes. > 50%  dominated by counseling and coordination of care.    Thyroid test explained to the patient why some of these tests or procedures need to be done as an outpatient and we are getting it ordered and scheduled for him and this will be on the records.  Patient was not happy about it but I have checked with radiology and this is how it needs to be done.  . .  IR wants a biopsy scheduled as outpatient only and MRI cannot be done here been clarified with our department and needs to be done at [facility] send there is no indication for inpatient transfer to [facility] for this this can be ordered and done as an outpatient. .      Making all attempts to get it scheduled    Surendra Pedro MD  2/26/2024

## 2024-02-26 NOTE — PROGRESS NOTES
Spoke with IR Dr. Díaz who states Thyroid FNA is normally performed on an outpatient basis. Informed Ahsleigh HASTINGS that OP order would need to be placed and pt could be scheduled at that time. Ashleigh states she will share this information with the ordering physician.

## 2024-02-26 NOTE — CARE COORDINATION
DISCHARGE ORDER  Date/Time 2024 1:32 PM  Completed by: Nena Ring RN, Case Management    Patient Name: Kanwal Horta    : 1965      Admit order Date and Status:ip   Noted discharge order. (verify MD's last order for status of admission/Traditional Medicare 3 [place] Inpatient qualifying stay required for SNF)    Confirmed discharge plan with:              Patient:  Yes              When pt confirms DC plan does any support person need to be contacted by CM No I    Discharge to Facility: [facility]   Facility phone number for staff giving report: [phone]   Pre-certification completed: na   Hospital Exemption Notification (HENS) completed: na   Discharge orders and Continuity of Care faxed to facility:  epic      Transportation:               Medical Transport explained with choice list offered to pt/family.                Choice:(no preference)  Agency used: [facility]   time:   1430      Pt/family/Nursing/Facility aware of  time:   Yes Names: priyanka bird rae, laurence  Ambulance form completed:  roundtrip:      Date Last IMM Given: -pt refused to sign    Comments:met with pt at bedside. Pt is angry about returning to SNF without having OP scans completed while IP. Our MRI machine is not compatable with his PM. LM for Amada that pt is returning today. Transportation being arranged    Pt is being d/c'd to [facility] today. Pt's O2 sats are 96% on ra.    Discharge timeout done with rn, cm, pt. All discharge needs and concerns addressed.    Discharging nurse to complete PARISA, reconcile AVS, and place final copy with patient's discharge packet. Discharging RN to ensure that written prescriptions for  Level II medications are sent with patient to the facility as per protocol.

## 2024-02-26 NOTE — PROGRESS NOTES
In to see patient, patient states that he is feeling really tired and hasn't slept well, does not want to take nightly medication until 1 am, also doesn't want to turn unless he has a bowel movement.  Discussed plan of care, assessment completed, IJ flushed and assessed.  Call light within reach

## 2024-02-26 NOTE — PROGRESS NOTES
Treatment time: 3 hrs    Net UF: 2500 ml    Pre weight: 124. kg  Post weight: 121.7 kg    Access used: Lt UE AVF  Access function: Well tolerated, 350 BFR    Medications or blood products given: Midodrine 10 mg    Regular outpatient schedule: MWF    Summary of response to treatment: Pt tolerated well. Pt remained stable throughout entire treatment and upon exiting hemodialysis suite.     Copy of dialysis treatment record placed in chart, to be scanned into EMR.

## 2024-02-26 NOTE — PROGRESS NOTES
PULMONARY PROGRESS NOTE  CC: Persistent hypotension, upper extremity weakness, ESRD on HD, admitted to ICU on Levophed 2/19/24    Subjective:   Dialysis today.  Unable to perform MRI brain/neck.     IV line: RIJ 2/19/24, Peripherals    PHYSICAL EXAM:   BP 93/62   Pulse 87   Temp 97.4 °F (36.3 °C) (Oral)   Resp 19   Ht 1.778 m (5' 10\")   Wt 124.2 kg (273 lb 12.8 oz)   SpO2 97%   BMI 39.29 kg/m² ' on RA  General: appears chronically unwell  Resp: No crackles. No wheezing.   CV: S1, S2. Trace edema  GI: NT, ND, +BS  Skin: Warm and dry.    Neuro: PERRL. Alert and oriented to person, place and recent events      Scheduled Meds:   hydrocortisone sodium succinate PF  50 mg IntraVENous Q12H    aspirin  81 mg Oral Daily    sevelamer  3,200 mg Oral TID WC    traZODone  50 mg Oral Nightly    pantoprazole (PROTONIX) 40 mg in sodium chloride (PF) 0.9 % 10 mL injection  40 mg IntraVENous Q12H    dronedarone hcl  400 mg Oral BID WC    heparin (porcine)  5,000 Units SubCUTAneous 3 times per day    balsum peru-castor oil   Topical BID    midodrine  10 mg Oral TID WC    sodium chloride flush  5-40 mL IntraVENous 2 times per day     Continuous Infusions:   sodium chloride 5 mL/hr at 02/23/24 1816     PRN Meds:  lidocaine, sevelamer, prochlorperazine, diphenhydrAMINE, sodium chloride flush, sodium chloride, polyethylene glycol, acetaminophen **OR** acetaminophen    Labs:  CBC:   Recent Labs     02/24/24  0625 02/25/24  0333 02/26/24  0700   WBC 6.2 7.8 7.1   HGB 10.2* 10.7* 11.3*   HCT 32.4* 34.4* 36.0*   MCV 98.4 99.9 100.5*    191 174     BMP:   Recent Labs     02/26/24  0700      K 4.1   CL 98*   CO2 29   PHOS 4.1   BUN 53*   CREATININE 5.8*     Micro:   2/19/24 SARS-CoV-2 not detected  2/19/24 BC NG  2/20/24 MRSA nasal screen negative  2/22/24 Pneumonia molecular panel: Respiratory syncytial virus by PCR   2/26/24 Pneumonia molecular panel was sent again ..    Imaging: Chest imaging was reviewed by me and

## 2024-02-26 NOTE — DISCHARGE INSTR - COC
Continuity of Care Form    Patient Name: Kanwal Horta   :  1965  MRN:  [id number]    Admit date:  2024  Discharge date:  2024      Code Status Order: Full Code   Advance Directives:     Admitting Physician:  Pamella Trevino DO  PCP: Silvia Boswell MD    Discharging Nurse: DARLING Sotelo  Discharging Hospital Unit/Room#: /0320-01  Discharging Unit Phone Number: [phone]    Emergency Contact:   Extended Emergency Contact Information  Primary Emergency Contact: Lacy Chacon  Home Phone: [phone]  Relation: Brother/Sister  Secondary Emergency Contact: Demetria [place] [address]  Home Phone: [phone]  Relation: Brother/Sister    Past Surgical History:  Past Surgical History:   Procedure Laterality Date    ABLATION OF DYSRHYTHMIC FOCUS  09/15/2015    SVT ablation     CARDIAC DEFIBRILLATOR PLACEMENT Left     2019    DIALYSIS FISTULA CREATION  2013       Immunization History:   Immunization History   Administered Date(s) Administered    Influenza Vaccine, unspecified formulation 2015       Active Problems:  Patient Active Problem List   Diagnosis Code    Morbid obesity ([facility]) E66.01    Lymphedema of leg I89.0    Anemia D64.9    Thrombocytopenia ([facility]) D69.6    Renal failure N19    Near syncope R55    Chronic renal failure N18.9    Atrial fibrillation ([facility]) I48.91    Palpitations R00.2    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    Ventricular tachycardia ([facility]) I47.20    Lymphedema I89.0    Non-pressure chronic ulcer of left calf with fat layer exposed ([facility]) L97.222    Chest pain R07.9    Atypical chest pain R07.89    Blurred vision H53.8    ESRD on dialysis ([facility]) N18.6, Z99.2    Atrial fibrillation, chronic ([facility]) I48.20    Arterial hypotension I95.9    Atrial fibrillation with rapid ventricular response ([facility]) I48.91    AICD (automatic cardioverter/defibrillator) present Z95.810    Staph aureus infection A49.01    Hypotension I95.9    Shock ([facility]) R57.9

## 2024-02-26 NOTE — PROGRESS NOTES
Pt asked to see someone from administration.  I entered the room and pt was on his iphone.  I asked him if he was on the phone and he said no.  Turned down his tv and continued on his phone when I introduced myself.  Pt would not let me speak and told me to not be argumentive.  I told the pt that since he asked to see me he could at least put his phone down to talk to me.  Pt insists it is a game and not his phone.  Pt is upset his MRI can not be done here.  I explained that his defibrillator was not compatible with our machine and that the tests can all be ordered outpt.  Pt thinks it is stupid to go back to nursing home and wants to be transferred to [place] for out pt mri and then nursing home and I explained that the outpt appointment will not be available that quick.  He states that he then still wants to stay one more day for monitoring.  I explained that if he was medically stable with d//c orders he could go today.  I offered a d/c planner and pt declined.  Carlee HARRISN, RN

## 2024-02-28 ENCOUNTER — TELEPHONE (OUTPATIENT)
Dept: PULMONOLOGY | Age: 59
End: 2024-02-28

## 2024-02-28 NOTE — TELEPHONE ENCOUNTER
See Jhonny aSlcedo in 4-6 weeks for f/u PA LAT CXR, dx pneumonia and for outpatient sleep evaluation per his hospital note.  If patient prefers, f/u can be done through nursing home.

## 2024-02-29 NOTE — DISCHARGE SUMMARY
Name:  Kanwal Horta  Room:  /0320-01  MRN:    [id number]    Discharge Summary      This discharge summary is in conjunction with a complete physical exam done on the day of discharge.    Discharging Physician: Dr. Pedro       Admit: 2/19/2024  Discharge:  2/26/2024    HPI taken from admission H&P:    58 y.o. male who presented to [facility] with past medical history atrial fibrillation, end-stage disease, lymphedema, SVT presented to the ED with chief complaint of numbness     Patient reported that he normally goes to dialysis regularly his dialysis schedule is Monday Wednesday Friday has missed dialysis on Friday and noted that he went admitted up on Saturday to get dialysis.  Patient noticed has been having initially left-sided upper extremity weakness progressively worsening and now having bilateral upper extremity weakness.  Patient also reports his balance has been off reports that used to be on PD dialysis however was able to tolerate it thus was now doing hemodialysis.  Patient otherwise denies have any numbness tingling abdominal pain does report that he continues to urinate minimally.  Patient denies ever having hemorrhagic stroke or stroke also admits to taking his medications with no active bleeding.     Diagnoses this Admission and Hospital Course   Shock:  Etiology unclear however suspected adrenal in etiology  Patient was recently received cefazolin ended on 02/14/2024  Repeat blood cultures obtained,, chest x-ray showing bilateral effusion with suspected of left-sided pneumonia.  Continue IV vancomycin and ceftriaxone  Patient was given 2 L of fluid with minimal improvement  Echo to rule out cardiac etiology  Cortisol level check for adrenal insufficiency  Trend H&H to rule out hemorrhagic- stable.  On hydrocortisone--> being tapered   S/p Levophed gtt  Continue midodrine  Vancomycin discontinued  Goal MAP 65      Roque UE neuro symptoms  Last known well> 48 hours ago  CT head reviewed   MRI  Amplification         Culture, Blood 2 [[id number]] Collected: 02/19/24 1445    Order Status: Completed Specimen: Blood Updated: 02/24/24 0215     Culture, Blood 2 No Growth after 4 days of incubation.    Narrative:      ORDER#: [id number]                          ORDERED BY: FERNANDO PALACIOS  SOURCE: Blood                              COLLECTED:  02/19/24 14:45  ANTIBIOTICS AT [place].:                      RECEIVED :  02/20/24 01:31  If child <=2 yrs old please draw pediatric bottle.~Blood Culture #2    Culture, Blood 1 [[id number]] Collected: 02/19/24 1430    Order Status: Completed Specimen: Blood Updated: 02/24/24 0215     Blood Culture, Routine No Growth after 4 days of incubation.    Narrative:      ORDER#: [id number]                          ORDERED BY: FERNANDO PALACIOS  SOURCE: Blood                              COLLECTED:  02/19/24 14:30  ANTIBIOTICS AT [place].:                      RECEIVED :  02/20/24 01:31  If child <=2 yrs old please draw pediatric bottle.~Blood Culture 1              RADIOLOGY  US THYROID   Final Result   1. Nodule 1: ACR TI-RADS 2017 Category 4. Recommend: Ultrasound-guided fine   needle aspiration      ACR TI-RADS 2017 Recommendations:      TR1(0 points) : No FNA or follow up      TR2 (2 points) : No FNA or follow up      TR3 (3 points) : FNA if >/= 2.5 cm, follow up if 1.5 - 2.4 cm in 1, 3, and 5   years      TR4 (4-6 points) : FNA if >/= 1.5 cm, follow up if 1.0 - 1.4 cm in 1, 2, 3,   and 5 years      TR5 (>/= 7 points) : FNA if >/= 1.0 cm, follow up if 0.5 - 0.9 cm every year   for 5 years      *ACR TI-RADS recommends that no more than two nodules with the highest ACR   TI-RADS total point should be biopsied and no more than four nodules should   be followed.         CTA HEAD NECK W CONTRAST   Final Result   1. No evidence of intracranial aneurysm or active extravasation.   2. Moderate to severe narrowing of the bilateral vertebral artery origins.   3. No evidence for arterial occlusion or

## 2024-03-01 NOTE — PROGRESS NOTES
Physician Progress Note      PATIENT:               MARYAM COURTNEY  CSN #:                  [id number]  :                       1965  ADMIT DATE:       2024 9:53 AM  DISCH DATE:        2024 3:31 PM  RESPONDING  PROVIDER #:        Suerndra Pedro MD          QUERY TEXT:    Pt admitted with pneumonia.  If possible, please document in the progress   notes and discharge summary if you are evaluating and/or treating any of the   following:      Note: CAP and HCAP indicate where the pneumonia was acquired, not a specific   type.    The medical record reflects the following:  Risk Factors: pneumonia  Clinical Indicators: Sputum RSV+  Treatment: IV vancomycin and ceftriaxone    Thank You Paula Aragon RN, CDS [email or web address]  Options provided:  -- Gram negative pneumonia  -- Other - I will add my own diagnosis  -- Disagree - Not applicable / Not valid  -- Disagree - Clinically unable to determine / Unknown  -- Refer to Clinical Documentation Reviewer    PROVIDER RESPONSE TEXT:    This patient has gram negative pneumonia.    Query created by: Paula Aragon on 3/1/2024 1:25 PM      Electronically signed by:  Surendra Pedro MD 3/1/2024 2:25 PM

## 2024-03-04 ENCOUNTER — HOSPITAL ENCOUNTER (OUTPATIENT)
Dept: ULTRASOUND IMAGING | Age: 59
Discharge: HOME | End: 2024-03-04
Attending: INTERNAL MEDICINE
Payer: MEDICARE

## 2024-03-04 DIAGNOSIS — E04.1 THYROID NODULE: ICD-10-CM

## 2024-03-04 PROCEDURE — 88305 TISSUE EXAM BY PATHOLOGIST: CPT

## 2024-03-04 PROCEDURE — 88173 CYTOPATH EVAL FNA REPORT: CPT

## 2024-03-04 PROCEDURE — 10005 FNA BX W/US GDN 1ST LES: CPT

## 2024-03-04 NOTE — PROCEDURES
Pt arrived for image guided thyroid nodule biopsy left. Procedure explained including the risk and benefits of the procedure. All questions answered. Pt verbalizes understanding of the of procedure and states no more questions. Consent signed. Vital signs stable, labs, allergies, medications, and code status reviewed. No contraindications noted.     There were no vitals filed for this visit. (vital signs in table format)    Bonnie Score  2 - Able to move 4 extremities voluntarily on command  2 - BP+/- 20mmHg of normal  2 - Fully awake  2 - Able to maintain oxygen saturation >92% on room air  2 - Able to breathe deeply and cough freely    Allergies  Vancomycin, Bactrim [sulfamethoxazole-trimethoprim], Amoxicillin, and Darvon [propoxyphene]    Labs  Lab Results   Component Value Date    INR 0.97 05/10/2016    PROTIME 11.0 05/10/2016       Lab Results   Component Value Date    CREATININE 5.8 (HH) 02/26/2024    BUN 53 (H) 02/26/2024    GFR 27 (L) 01/07/2013     02/26/2024    K 4.1 02/26/2024    CL 98 (L) 02/26/2024    CO2 29 02/26/2024       Lab Results   Component Value Date    WBC 7.1 02/26/2024    HGB 11.3 (L) 02/26/2024    HCT 36.0 (L) 02/26/2024    .5 (H) 02/26/2024     02/26/2024

## 2024-03-04 NOTE — PROGRESS NOTES
Image guided thyroid nodule biopsy biopsy completed by Dr. Douglas. Pt tolerated procedure without any signs or symptoms of distress. Vital signs stable. Report given transporting squad. Pt transported back to NH in stable condition via stretcher.     Total Biopsy: 5  Bandage to left throat that is clean dry and intact.   Vital Signs  There were no vitals filed for this visit. (vital signs in table format)

## 2024-03-07 PROBLEM — E04.1 THYROID NODULE: Status: ACTIVE | Noted: 2024-03-07

## 2024-03-07 PROBLEM — E27.40 ADRENAL INSUFFICIENCY (HCC): Status: ACTIVE | Noted: 2024-03-07

## 2024-05-02 ENCOUNTER — TELEPHONE (OUTPATIENT)
Dept: PULMONOLOGY | Age: 59
End: 2024-05-02

## 2024-05-02 NOTE — TELEPHONE ENCOUNTER
Patient did not show for 6 week follow up  appointment  with Dr Donovan on 5/2/24    Same Day Cancellation: No    Patient rescheduled:  No    New appointment:     Patient was also no show on: N/A    LOV  N/A

## 2024-05-10 NOTE — TELEPHONE ENCOUNTER
Left message for Pt to return my phone call to the office on preferred number for a nurse to call me back to get Pt r/s for his appointment.

## 2024-05-15 ENCOUNTER — HOSPITAL ENCOUNTER (OUTPATIENT)
Age: 59
Discharge: HOME | End: 2024-05-15
Payer: MEDICARE

## 2024-05-15 ENCOUNTER — HOSPITAL ENCOUNTER (OUTPATIENT)
Dept: GENERAL RADIOLOGY | Age: 59
Discharge: HOME | End: 2024-05-15
Payer: MEDICARE

## 2024-05-15 ENCOUNTER — OFFICE VISIT (OUTPATIENT)
Dept: PULMONOLOGY | Age: 59
End: 2024-05-15
Payer: MEDICARE

## 2024-05-15 VITALS
BODY MASS INDEX: 38.37 KG/M2 | RESPIRATION RATE: 18 BRPM | DIASTOLIC BLOOD PRESSURE: 53 MMHG | SYSTOLIC BLOOD PRESSURE: 99 MMHG | WEIGHT: 268 LBS | OXYGEN SATURATION: 96 % | HEART RATE: 61 BPM | HEIGHT: 70 IN

## 2024-05-15 DIAGNOSIS — N18.6 ESRD (END STAGE RENAL DISEASE) (HCC): Primary | ICD-10-CM

## 2024-05-15 DIAGNOSIS — N18.6 ESRD (END STAGE RENAL DISEASE) (HCC): ICD-10-CM

## 2024-05-15 PROCEDURE — G8417 CALC BMI ABV UP PARAM F/U: HCPCS | Performed by: INTERNAL MEDICINE

## 2024-05-15 PROCEDURE — 99214 OFFICE O/P EST MOD 30 MIN: CPT | Performed by: INTERNAL MEDICINE

## 2024-05-15 PROCEDURE — 3017F COLORECTAL CA SCREEN DOC REV: CPT | Performed by: INTERNAL MEDICINE

## 2024-05-15 PROCEDURE — G8427 DOCREV CUR MEDS BY ELIG CLIN: HCPCS | Performed by: INTERNAL MEDICINE

## 2024-05-15 PROCEDURE — 71046 X-RAY EXAM CHEST 2 VIEWS: CPT

## 2024-05-15 PROCEDURE — 1036F TOBACCO NON-USER: CPT | Performed by: INTERNAL MEDICINE

## 2024-05-15 NOTE — PROGRESS NOTES
[facility]                                 Pulmonary/Critical care  Consult /Progress Note :                                                                  CC :Hypotension     Follow hospital admission    Subjective  Was seen in Feb for low BP and SOB and A fib    On HD   Follow with [facility] for cardiac issues  SOB better,however last few days missed HD  He is to call for HD as soon as possible,I encourage admission he declined for HD  Last HD was Monday ,jhad 4 L removed   On RA   Smoke for only 5-10 years     PHYSICAL EXAM:  Blood pressure (!) 99/53, pulse 61, resp. rate 18, height 1.778 m (5' 10\"), weight 121.6 kg (268 lb), SpO2 96 %.' on RA  Gen: No distress.   Eyes: PERRL. No sclera icterus. No conjunctival injection.   ENT: No discharge. Pharynx clear.   Neck: Trachea midline. No obvious mass.    Resp: rhonchi bilateral   GI: Non-tender. Non-distended. No hernia.   Skin: Warm and dry. No nodule on exposed extremities.   Lymph: No cervical LAD. No supraclavicular LAD.   M/S: No cyanosis. No joint deformity. No clubbing.   Neuro: Awake. Alert. Moves all four extremities.   Psych: Oriented x 3. No anxiety.     LABS:        Microbiology:  Pending       Imaging:  Chest imaging was reviewed by me and showed no acute process          ASSESSMENT:     A fib   V tach  History WPW s/p ablation   NICM  ESRD on HD  S/P ICD   Possible adrenal insuffiencey  Possible OWEN  Possible TIA     PLAN:    HD per renal encouraged as soon   Will get CXR   On RA  Declined sleep study   ,on hydrocortisone as he states   Watch BP as has low BP this am before    need sleep study as OP,declined

## 2024-06-09 NOTE — CODE DOCUMENTATION
[facility] Coroner's office called. Spoke with Jose Estrada to release the body.

## 2024-06-09 NOTE — ED PROVIDER NOTES
[facility] ED  EMERGENCY DEPARTMENT ENCOUNTER        Patient Name: Kanwal Horta  MRN: [id number]  Birthdate 1965  Date of evaluation: 6/9/2024  PCP: Silvia Boswell MD  Note Started: 9:03 AM EDT 6/9/24      CHIEF COMPLAINT  unresponsive         HISTORY & PHYSICAL     HISTORY OF PRESENT ILLNESS  History from : Patient    Limitations to history : None    Kanwal Horta is a 58 y.o. male  has a past medical history of Atrial fibrillation, chronic (HCC), AVNRT (AV darci re-entry tachycardia) (HCC), End stage chronic kidney disease (HCC), ESRD on hemodialysis (HCC), Hemodialysis patient (HCC), Hypotension, Lymph edema, Pneumonia (12/12/2012), and SVT (supraventricular tachycardia) (HCC)., who presents to the ED dad on arrival.  EMS was called out for shortness of breath and vision changes.  On their arrival patient was alert and oriented x 2.  He then quickly became unresponsive but still had a pulse.  And route pulses were lost.  Patient is DNR CC with paperwork.  He is reportedly noncompliant with his medicine and dialysis.  He has reportedly missed 2 dialysis treatments this past week    Old records reviewed:  Patient most recently seen 5/23/2024 by vascular surgery for a postoperative follow-up after undergoing left cephalic vein turndown to axillary vein.  He was most recently admitted 4/30/2024 for hypotension    No other complaints, modifying factors or associated symptoms.  Nursing Notes were all reviewed and agreed with or any disagreements were addressed in the HPI.    I have reviewed the following from the nursing documentation.    Past Medical History:   Diagnosis Date    Atrial fibrillation, chronic (HCC)     AVNRT (AV darci re-entry tachycardia) (HCC)     End stage chronic kidney disease (HCC)     ESRD on hemodialysis (HCC)     Mon-Wed-Fri    Hemodialysis patient (HCC)     Hypotension     Lymph edema     Pneumonia 12/12/2012    SVT (supraventricular tachycardia) (HCC)

## 2024-06-09 NOTE — CODE DOCUMENTATION
[facility] called and spoke with Kaycee. Referral # [id number]. [facility] placed hold on body and will follow up with family.

## 2024-06-09 NOTE — CODE DOCUMENTATION
Patient does not have a pulse; PEA; Dr. Santiago performed bedside ultrasound and there was not cardiac activity. 0855 time of death per Dr. Santiago.

## 2024-06-09 NOTE — ED NOTES
Director of Nursing from patient's ECF [facility] called to give report.  Reports patient called out this am with c/o visual changes and not feeling well.      Hx of noncompliance - especially with refusing dialysis.  Missed two treatments this past week.    Supportive family - sister Lacy - ECF has called her twice this am with no answer.